# Patient Record
Sex: FEMALE | Race: WHITE | NOT HISPANIC OR LATINO | Employment: OTHER | URBAN - METROPOLITAN AREA
[De-identification: names, ages, dates, MRNs, and addresses within clinical notes are randomized per-mention and may not be internally consistent; named-entity substitution may affect disease eponyms.]

---

## 2017-04-11 ENCOUNTER — CONVERSION ENCOUNTER (OUTPATIENT)
Dept: GENERAL RADIOLOGY | Facility: HOSPITAL | Age: 72
End: 2017-04-11

## 2018-06-14 ENCOUNTER — OFFICE VISIT CONVERTED (OUTPATIENT)
Dept: SURGERY | Facility: CLINIC | Age: 73
End: 2018-06-14
Attending: SURGERY

## 2018-06-14 ENCOUNTER — CONVERSION ENCOUNTER (OUTPATIENT)
Dept: SURGERY | Facility: CLINIC | Age: 73
End: 2018-06-14

## 2018-06-22 ENCOUNTER — OFFICE VISIT CONVERTED (OUTPATIENT)
Dept: FAMILY MEDICINE CLINIC | Facility: CLINIC | Age: 73
End: 2018-06-22
Attending: FAMILY MEDICINE

## 2018-06-26 ENCOUNTER — CONVERSION ENCOUNTER (OUTPATIENT)
Dept: SURGERY | Facility: CLINIC | Age: 73
End: 2018-06-26

## 2018-06-26 ENCOUNTER — OFFICE VISIT CONVERTED (OUTPATIENT)
Dept: SURGERY | Facility: CLINIC | Age: 73
End: 2018-06-26
Attending: SURGERY

## 2018-06-29 ENCOUNTER — CONVERSION ENCOUNTER (OUTPATIENT)
Dept: FAMILY MEDICINE CLINIC | Facility: CLINIC | Age: 73
End: 2018-06-29

## 2018-09-10 ENCOUNTER — OFFICE VISIT CONVERTED (OUTPATIENT)
Dept: FAMILY MEDICINE CLINIC | Facility: CLINIC | Age: 73
End: 2018-09-10
Attending: FAMILY MEDICINE

## 2018-09-10 ENCOUNTER — CONVERSION ENCOUNTER (OUTPATIENT)
Dept: FAMILY MEDICINE CLINIC | Facility: CLINIC | Age: 73
End: 2018-09-10

## 2018-10-16 ENCOUNTER — OFFICE VISIT CONVERTED (OUTPATIENT)
Dept: FAMILY MEDICINE CLINIC | Facility: CLINIC | Age: 73
End: 2018-10-16
Attending: FAMILY MEDICINE

## 2019-06-28 ENCOUNTER — HOSPITAL ENCOUNTER (OUTPATIENT)
Dept: URGENT CARE | Facility: CLINIC | Age: 74
Discharge: HOME | End: 2019-06-28

## 2019-06-30 LAB — BACTERIA UR CULT: NORMAL

## 2019-09-10 ENCOUNTER — HOSPITAL ENCOUNTER (OUTPATIENT)
Dept: URGENT CARE | Facility: CLINIC | Age: 74
Discharge: HOME | End: 2019-09-10
Attending: NURSE PRACTITIONER

## 2019-09-12 LAB — BACTERIA SPEC AEROBE CULT: NORMAL

## 2020-01-16 ENCOUNTER — HOSPITAL ENCOUNTER (OUTPATIENT)
Dept: LAB | Facility: HOSPITAL | Age: 75
Discharge: HOME | End: 2020-01-16

## 2020-02-18 ENCOUNTER — OFFICE VISIT CONVERTED (OUTPATIENT)
Dept: FAMILY MEDICINE CLINIC | Facility: CLINIC | Age: 75
End: 2020-02-18
Attending: NURSE PRACTITIONER

## 2020-08-20 ENCOUNTER — OFFICE VISIT CONVERTED (OUTPATIENT)
Dept: PODIATRY | Facility: CLINIC | Age: 75
End: 2020-08-20
Attending: PODIATRIST

## 2020-10-06 ENCOUNTER — OFFICE VISIT CONVERTED (OUTPATIENT)
Dept: PODIATRY | Facility: CLINIC | Age: 75
End: 2020-10-06
Attending: PODIATRIST

## 2021-02-16 ENCOUNTER — APPOINTMENT (OUTPATIENT)
Dept: VACCINE CLINIC | Facility: HOSPITAL | Age: 76
End: 2021-02-16

## 2021-02-24 ENCOUNTER — IMMUNIZATION (OUTPATIENT)
Dept: VACCINE CLINIC | Facility: HOSPITAL | Age: 76
End: 2021-02-24

## 2021-02-24 PROCEDURE — 91300 HC SARSCOV02 VAC 30MCG/0.3ML IM: CPT | Performed by: INTERNAL MEDICINE

## 2021-02-24 PROCEDURE — 0001A: CPT | Performed by: INTERNAL MEDICINE

## 2021-03-17 ENCOUNTER — IMMUNIZATION (OUTPATIENT)
Dept: VACCINE CLINIC | Facility: HOSPITAL | Age: 76
End: 2021-03-17

## 2021-03-17 PROCEDURE — 0002A: CPT | Performed by: INTERNAL MEDICINE

## 2021-03-17 PROCEDURE — 91300 HC SARSCOV02 VAC 30MCG/0.3ML IM: CPT | Performed by: INTERNAL MEDICINE

## 2021-05-10 NOTE — H&P
History and Physical      Patient Name: Sue Simmons   Patient ID: [id number]   Sex: Female   YOB: 1945    Primary Care Provider: Samuel Manuel MD   Referring Provider: Jama Martin MD    Visit Date: August 20, 2020    Provider: Henry Sifuentes DPM   Location: [facility]   Location Address: [address] [place] [address]  [id number]   Location Phone: [phone]          Chief Complaint  · Right Foot Pain      History Of Present Illness  Sue Simmons is a 75 year old /White female who presents to the [facility] today new patient referred from Jama Martin MD.      New, Established, New Problem:  new  Location:  Right lateral midfoot  Duration:  2 months  Onset:  insidious  Nature:  sore  Stable, worsening, improving:  intermittent, stable    Aggravating factors:   Patient relates pain is aggravated by shoe gear and ambulation.   Previous Treatment:  none    Patient denies any fevers, chills, nausea, vomiting, shortness of breathe, nor any other constitutional signs nor symptoms.         Past Medical History  Anemia; Arthritis; Back Pain with Radiation; Breast lump; Cancer; Cancer of right breast; Foot pain, right; Hematuria, microscopic; Hemorrhoids; Hypothyroidism; IC (interstitial cystitis); Ingrown toenail; Mood disorder; Rectal bleeding; Vitamin D deficiency         Past Surgical History  Appendectomy; Breast; Colonoscopy; Cyst Removal; Cystoscopy; Lumpectomy, left breast; Lumpectomy, right breast; Port Placement; Santiago Cath Placement; Removal of hardware; Tonsillectomy         Medication List  ginkgo biloba 60 mg oral capsule; levothyroxine 100 mcg oral tablet; spironolactone 25 mg oral tablet; Vitamin D3 2,000 unit oral capsule         Allergy List  Codeine Phosphate; Keflex; PENICILLINS; Seasonal; SULFA (SULFONAMIDES)         Family Medical History  Lung Neoplasm, Malignant; Brain Neoplasm, Malignant; Heart Disease;  "Alzheimer's Disease; Diabetes, unspecified type; Throat Cancer; Renal Calculus; Family history of breast cancer; Family history of Graves' disease         Reproductive History   1 Para 0 0 0 1       Social History  Alcohol (Light); Caffeine (Current some day); Retired; Second hand smoke exposure (Never); Sedentary; Tobacco (Current every day);          Immunizations  Name Date Admin   Influenza    Tdap          Review of Systems  · Constitutional  o Denies  o : fatigue, night sweats  · Eyes  o Denies  o : double vision, blurred vision  · HENT  o Denies  o : vertigo, recent head injury  · Cardiovascular  o Denies  o : chest pain, irregular heart beats  · Respiratory  o Denies  o : shortness of breath, productive cough  · Gastrointestinal  o Denies  o : nausea, vomiting  · Genitourinary  o Denies  o : dysuria, urinary retention  · Integument  o Denies  o : hair growth change, new skin lesions  · Neurologic  o Denies  o : altered mental status, seizures  · Musculoskeletal  o * See HPI  · Endocrine  o Denies  o : cold intolerance, heat intolerance  · Heme-Lymph  o Denies  o : petechiae, lymph node enlargement or tenderness  · Allergic-Immunologic  o Denies  o : frequent illnesses      Vitals  Date Time BP Position Site L\R Cuff Size HR RR TEMP (F) WT  HT  BMI kg/m2 BSA m2 O2 Sat HC       2020 10:31 /71 Sitting    66 - R  97.3 198lbs 0oz 5'  8.5\" 29.67 2.08 98 %          Physical Examination  · Constitutional  o Appearance  o : Awake, alert, well developed, well nourished and well groomed  · Cardiovascular  o Peripheral Vascular System  o :   § Pedal Pulses  § : pulses 2 bilaterally  § Extremities  § : no edema of the lower extremities  · Musculoskeletal  o Extremeties/Joint  o : Lower extremity muscle strength and range of motion is equal and symmetrical bilaterally.   · Skin and Subcutaneous Tissue  o General Inspection  o : Skin is noted to have normal texture and turgor, with no excresences " noted.  o Digits and Nails  o : The tonails are noted to be without disease.  · Neurologic  o Sensation  o : Epicritic sensations intact bilaterally.   o Gait and Station  o :   § Gait Screening  § : normal gait  · Right Ankle/Foot  o Inspection  o : Right foot: Foot structure is noted to be normal. +TTP to the Right styloid process area. No edema, erythema, lymphangitis, nor signs of infection.   · In Office Procedures  o View  o : AP/LATERAL/OBLIQUE   o Site  o : right, foot   o Indication  o : Right Foot Pain   o Study  o : X-rays ordered, taken in the office, and reviewed today.  o Xray  o : No periosteal reactions nor osteolytic changes seen. No occult fractures seen. Osteopenia changes seen throughout consistent with the patient's age.  o Comparative Data  o : No comparative data found          Assessment  · Foot pain, right     729.5/M79.671  · Bursitis     727.3/M71.9      Plan  · Orders  o Foot (Right) 3 or more views X-Ray [facility] Preferred View (26089-BO) - - 08/20/2020  · Medications  o Voltaren 1 % topical gel   SIG: apply to affected area(s) by topical route 4 times a day for 30 days   DISP: (5) 100 gm tube with 3 refills  Prescribed on 08/20/2020     o Medications have been Reconciled  o Transition of Care or Provider Policy  · Instructions  o Follow Up PRN.  o Discuss Findings: I have discussed the findings of this evaluation with the patient. The discussion included a complete verbal explanation of any changes in the examination results, diagnosis, and the current treatment plan. A schedule for future care needs was explained. If any questions should arise after returning home, I have encouraged the patient to feel free to contact Dr. Sifuentes. The patient states understanding and agreement with this plan.  o Discussed proper shoegear for the patient's feet and medical condition. Pt states understanding and agreement with this plan.  o Patient to monitor for recurrence of symptoms and to contact   [facility] office for a follow-up appointment.  o Rice Therapy: It is important to treat any injury as soon as possible to help control swelling and increase recovery time. The recognized regimen for immediate treatment of sport injuries includes rest, ice (cold application), compression, and elevation (RICE). Remove the injured athlete from play, apply ice to the affected area, wrap or compress the injured area with an elastic bandage when appropriate, and elevate the injured area above heart level to reduce swelling. The patient is to not use ice for longer than 20 minutes at a time, with at least 20 minutes of no ice usage between applications. The patient states understanding and agreement with this plan.   o Electronically Identified Patient Education Materials Provided Electronically  · Disposition  o Call or Return if symptoms worsen or persist.            Electronically Signed by: Henry Sifuentes DPM -Author on August 20, 2020 10:53:09 AM

## 2021-05-13 NOTE — PROGRESS NOTES
Progress Note      Patient Name: Sue Simmons   Patient ID: [id number]   Sex: Female   YOB: 1945    Primary Care Provider: Samuel Manuel MD   Referring Provider: Henry Sifuentes DPM    Visit Date: October 6, 2020    Provider: Henry Sifuentes DPM   [place] [facility]   Location Address: [address] [place] [address]  [id number]   Location Phone: [phone]          Chief Complaint  · Right Foot Pain      History Of Present Illness  Sue Simmons is a 75 year old /White female who presents to the [facility] today a follow up for:      New, Established, New Problem:  est  Location:  Right lateral midfoot  Duration:    Onset:  insidious  Nature:  sore  Stable, worsening, improving:  no improvement   Aggravating factors:   Patient relates pain is aggravated by shoe gear and ambulation.   Previous Treatment:  Voltaren gel    Patient denies any fevers, chills, nausea, vomiting, shortness of breathe, nor any other constitutional signs nor symptoms.    Patient relates no medical changes since their last visit.       Past Medical History  Anemia; Arthritis; Back Pain with Radiation; Breast lump; Cancer; Cancer of right breast; Foot pain, right; Hematuria, microscopic; Hemorrhoids; Hypothyroidism; IC (interstitial cystitis); Ingrown toenail; Mood disorder; Rectal bleeding; Vitamin D deficiency         Past Surgical History  Appendectomy; Breast; Colonoscopy; Cyst Removal; Cystoscopy; Lumpectomy, left breast; Lumpectomy, right breast; Port Placement; Santiago Cath Placement; Removal of hardware; Tonsillectomy         Medication List  ginkgo biloba 60 mg oral capsule; levothyroxine 100 mcg oral tablet; LEVOTHYROXINE 0.100MG (100MCG) TAB; spironolactone 25 mg oral tablet; Vitamin D3 2,000 unit oral capsule; Voltaren 1 % topical gel         Allergy List  Codeine Phosphate; Keflex; PENICILLINS; Seasonal; SULFA (SULFONAMIDES)         Family Medical History  Lung  "Neoplasm, Malignant; Brain Neoplasm, Malignant; Heart Disease; Alzheimer's Disease; Diabetes, unspecified type; Throat Cancer; Renal Calculus; Family history of breast cancer; Family history of Graves' disease         Reproductive History   1 Para 0 0 0 1       Social History  Alcohol (Light); Caffeine (Current some day); Retired; Second hand smoke exposure (Never); Sedentary; Tobacco (Current every day);          Immunizations  Name Date Admin   Influenza 09/10/2017   Tdap 2017         Review of Systems  · Constitutional  o Denies  o : fatigue, night sweats  · Eyes  o Denies  o : double vision, blurred vision  · HENT  o Denies  o : vertigo, recent head injury  · Cardiovascular  o Denies  o : chest pain, irregular heart beats  · Respiratory  o Denies  o : shortness of breath, productive cough  · Gastrointestinal  o Denies  o : nausea, vomiting  · Genitourinary  o Denies  o : dysuria, urinary retention  · Integument  o Denies  o : hair growth change, new skin lesions  · Neurologic  o Denies  o : altered mental status, seizures  · Musculoskeletal  o * See HPI  · Endocrine  o Denies  o : cold intolerance, heat intolerance  · Heme-Lymph  o Denies  o : petechiae, lymph node enlargement or tenderness  · Allergic-Immunologic  o Denies  o : frequent illnesses      Vitals  Date Time BP Position Site L\R Cuff Size HR RR TEMP (F) WT  HT  BMI kg/m2 BSA m2 O2 Sat FR L/min FiO2 HC       10/06/2020 08:10 /60 Sitting    68 - R  97.6 193lbs 16oz 5'  7\" 30.38 2.04 100 %            Physical Examination  · Constitutional  o Appearance  o : Awake, alert, well developed, well nourished and well groomed  · Cardiovascular  o Peripheral Vascular System  o :   § Pedal Pulses  § : pulses 2 bilaterally  § Extremities  § : no edema of the lower extremities  · Musculoskeletal  o Extremeties/Joint  o : Lower extremity muscle strength and range of motion is equal and symmetrical bilaterally.   · Skin and Subcutaneous " Tissue  o General Inspection  o : Skin is noted to have normal texture and turgor, with no excresences noted.  o Digits and Nails  o : The tonails are noted to be without disease.  · Neurologic  o Sensation  o : Epicritic sensations intact bilaterally.   o Gait and Station  o :   § Gait Screening  § : normal gait  · Right Ankle/Foot  o Inspection  o : Right foot: Foot structure is noted to be normal. +TTP to the Right styloid process area. No edema, erythema, lymphangitis, nor signs of infection.   · Injection Note/Aspiration Note  o Site  o : Right 5th styloid process area  o Procedure  o : This patient presents for a trigger point injection, which is located Retrocalcaneal bursa. I have discussed the nature, risks, benefits, alternatives and limitations of this procedure with this patient and obtained informed consent. The area was sterilely prepped with isopropyl alcohol. A 27 gauge, 1.25-inch needle was inserted into the affected area. The area was injected with 0.75 mL, of dexamethasone 4 mg/mL. The steroid was mixed with 0.75 mL, of bupivacaine 0.25%.  o Medication  o : 0.5ml of 1% lidocaine plain, 0.5ml of 40mg/ml Kenalog, and 0.5ml of 4mg/ml Dexamethasone   o Disposition  o : The patient tolerated the procedure well.          Assessment  · Foot pain, right     729.5/M79.671  · Bursitis     727.3/M71.9      Plan  · Orders  o Decadron 4 mg/1cc Injection () - 729.5/M79.671, 727.3/M71.9 - 10/06/2020   Injection - Dexamethasone 4mg/mL; Dose: 2 mg; Site: Not Entered; Route: subcutaneous; Date: 10/06/2020 08:29:03; Exp: 12/31/2020; Lot: [id number]; Mfg: [employer]; TradeName: dexamethasone sodium phosphate; Location: [facility]; Administered By: Henry Sifuentes DPM; Comment: ndc 62647-895-22 Injection site right foot  o 2.00 - Kenalog Injection 20mg (-3) - 729.5/M79.671, 727.3/M71.9 - 10/06/2020   Injection - Kenalog 20 mg; Dose: 20mg; Site: Not Entered; Route: subcutaneous; Date: 10/06/2020  08:30:24; Exp: 04/30/2022; Lot: [id number]; Mfg: [employer].; TradeName: triamcinolone acetonide; Location: [facility]; Administered By: Henry Sifuentes DPM; Comment: NDC 21446-163-81 Injection site right foot  o Inj Tendon Sheath Or Ligament (19553) - 729.5/M79.671, 727.3/M71.9 - 10/06/2020  · Medications  o Medications have been Reconciled  o Transition of Care or Provider Policy  · Instructions  o Patient request PRN follow-up.  o Discuss Findings: I have discussed the findings of this evaluation with the patient. The discussion included a complete verbal explanation of any changes in the examination results, diagnosis, and the current treatment plan. A schedule for future care needs was explained. If any questions should arise after returning home, I have encouraged the patient to feel free to contact Dr. Sifuentes. The patient states understanding and agreement with this plan.  o Discussed proper shoegear for the patient's feet and medical condition. Pt states understanding and agreement with this plan.  o Patient to monitor for recurrence of symptoms and to contact Dr. Stokes office for a follow-up appointment.  o Rice Therapy: It is important to treat any injury as soon as possible to help control swelling and increase recovery time. The recognized regimen for immediate treatment of sport injuries includes rest, ice (cold application), compression, and elevation (RICE). Remove the injured athlete from play, apply ice to the affected area, wrap or compress the injured area with an elastic bandage when appropriate, and elevate the injured area above heart level to reduce swelling. The patient is to not use ice for longer than 20 minutes at a time, with at least 20 minutes of no ice usage between applications. The patient states understanding and agreement with this plan.   o Electronically Identified Patient Education Materials Provided Electronically  · Disposition  o Call or Return if symptoms worsen  or persist.            Electronically Signed by: Henry Sifuentes DPM -Author on October 6, 2020 10:23:25 AM

## 2021-05-14 VITALS
HEART RATE: 68 BPM | SYSTOLIC BLOOD PRESSURE: 120 MMHG | HEIGHT: 67 IN | DIASTOLIC BLOOD PRESSURE: 60 MMHG | BODY MASS INDEX: 30.45 KG/M2 | WEIGHT: 194 LBS | TEMPERATURE: 97.6 F | OXYGEN SATURATION: 100 %

## 2021-05-15 VITALS
RESPIRATION RATE: 21 BRPM | OXYGEN SATURATION: 93 % | WEIGHT: 186.19 LBS | SYSTOLIC BLOOD PRESSURE: 108 MMHG | TEMPERATURE: 97.2 F | DIASTOLIC BLOOD PRESSURE: 56 MMHG | HEIGHT: 67 IN | BODY MASS INDEX: 29.22 KG/M2

## 2021-05-15 VITALS
SYSTOLIC BLOOD PRESSURE: 132 MMHG | DIASTOLIC BLOOD PRESSURE: 71 MMHG | BODY MASS INDEX: 30.01 KG/M2 | TEMPERATURE: 97.3 F | HEIGHT: 68 IN | WEIGHT: 198 LBS | HEART RATE: 66 BPM | OXYGEN SATURATION: 98 %

## 2021-05-16 VITALS
TEMPERATURE: 96.3 F | BODY MASS INDEX: 31.4 KG/M2 | SYSTOLIC BLOOD PRESSURE: 124 MMHG | HEART RATE: 75 BPM | HEIGHT: 67 IN | DIASTOLIC BLOOD PRESSURE: 66 MMHG | WEIGHT: 200.06 LBS | OXYGEN SATURATION: 98 %

## 2021-05-16 VITALS
HEIGHT: 67 IN | WEIGHT: 202.5 LBS | SYSTOLIC BLOOD PRESSURE: 130 MMHG | HEART RATE: 73 BPM | BODY MASS INDEX: 31.78 KG/M2 | OXYGEN SATURATION: 98 % | DIASTOLIC BLOOD PRESSURE: 70 MMHG | TEMPERATURE: 96.7 F

## 2021-05-16 VITALS — HEIGHT: 67 IN | RESPIRATION RATE: 14 BRPM | BODY MASS INDEX: 27.78 KG/M2 | WEIGHT: 177 LBS

## 2021-05-16 VITALS — RESPIRATION RATE: 16 BRPM | HEIGHT: 67 IN | BODY MASS INDEX: 27.78 KG/M2 | WEIGHT: 177 LBS

## 2021-05-16 VITALS — HEART RATE: 109 BPM | SYSTOLIC BLOOD PRESSURE: 131 MMHG | TEMPERATURE: 96.8 F | DIASTOLIC BLOOD PRESSURE: 62 MMHG

## 2021-07-26 ENCOUNTER — OFFICE VISIT (OUTPATIENT)
Dept: PODIATRY | Facility: CLINIC | Age: 76
End: 2021-07-26

## 2021-07-26 VITALS
HEART RATE: 75 BPM | WEIGHT: 210 LBS | HEIGHT: 67 IN | OXYGEN SATURATION: 100 % | TEMPERATURE: 97.1 F | SYSTOLIC BLOOD PRESSURE: 140 MMHG | BODY MASS INDEX: 32.96 KG/M2 | DIASTOLIC BLOOD PRESSURE: 54 MMHG

## 2021-07-26 DIAGNOSIS — M77.51 BURSITIS OF RIGHT FOOT: Primary | ICD-10-CM

## 2021-07-26 DIAGNOSIS — M79.671 FOOT PAIN, RIGHT: ICD-10-CM

## 2021-07-26 PROCEDURE — 99213 OFFICE O/P EST LOW 20 MIN: CPT | Performed by: PODIATRIST

## 2021-07-26 PROCEDURE — 20550 NJX 1 TENDON SHEATH/LIGAMENT: CPT | Performed by: PODIATRIST

## 2021-07-26 RX ORDER — MULTIPLE VITAMINS W/ MINERALS TAB 9MG-400MCG
1 TAB ORAL DAILY
COMMUNITY
End: 2023-01-11

## 2021-07-26 RX ORDER — TRIAMCINOLONE ACETONIDE 40 MG/ML
20 INJECTION, SUSPENSION INTRA-ARTICULAR; INTRAMUSCULAR ONCE
Status: COMPLETED | OUTPATIENT
Start: 2021-07-26 | End: 2021-07-26

## 2021-07-26 RX ORDER — LEVOTHYROXINE SODIUM 0.05 MG/1
50 TABLET ORAL DAILY
COMMUNITY
End: 2021-08-11

## 2021-07-26 RX ORDER — LIDOCAINE HYDROCHLORIDE 10 MG/ML
0.5 INJECTION, SOLUTION INFILTRATION; PERINEURAL ONCE
Status: COMPLETED | OUTPATIENT
Start: 2021-07-26 | End: 2021-07-26

## 2021-07-26 RX ORDER — DEXAMETHASONE SODIUM PHOSPHATE 4 MG/ML
2 INJECTION, SOLUTION INTRA-ARTICULAR; INTRALESIONAL; INTRAMUSCULAR; INTRAVENOUS; SOFT TISSUE ONCE
Status: COMPLETED | OUTPATIENT
Start: 2021-07-26 | End: 2021-07-26

## 2021-07-26 RX ADMIN — DEXAMETHASONE SODIUM PHOSPHATE 2 MG: 4 INJECTION, SOLUTION INTRA-ARTICULAR; INTRALESIONAL; INTRAMUSCULAR; INTRAVENOUS; SOFT TISSUE at 15:29

## 2021-07-26 RX ADMIN — TRIAMCINOLONE ACETONIDE 20 MG: 40 INJECTION, SUSPENSION INTRA-ARTICULAR; INTRAMUSCULAR at 15:30

## 2021-07-26 RX ADMIN — LIDOCAINE HYDROCHLORIDE 0.5 ML: 10 INJECTION, SOLUTION INFILTRATION; PERINEURAL at 15:30

## 2021-07-26 NOTE — PATIENT INSTRUCTIONS
Instructed to follow-up with her primary care physician.  Patient may begin to weight bear as tolerated in supportive shoes.  No impact activities for two weeks.  After that time, the patient may increase activities as tolerated. Patient states understanding and agreement with this plan.  The patient states understanding and agreement with this plan.

## 2021-07-26 NOTE — PROGRESS NOTES
[facility] - [facility]IATRY    Today's Date: 07/26/21    Patient Name: Sue Simmons  MRN: [id number]  CSN: [id number]  PCP: Provider, No Known  Referring Provider: No ref. provider found    SUBJECTIVE     Chief Complaint   Patient presents with   • Right Foot - Edema, Pain, Follow-up   • Left Foot - Edema, Pain, Follow-up     HPI: Sue Simmons, a 75 y.o.female, comes presents to clinic.    New, Established, New Problem: Established    Location: Right lateral midfoot    Duration: Spring 2021    Onset: Insidious    Nature: Sore, achy, recurring    Stable, worsening, improving: Worsening    Aggravating factors:  Patient relates pain is aggravated by shoe gear and ambulation.      Previous Treatment: Cortisone shot in the fall 2020 along with use of Voltaren gel    Patient denies any fevers, chills, nausea, vomiting, shortness of breathe, nor any other constitutional signs nor symptoms.    No other pedal complaints at this time.    Recent medical changes: None    Patient states she has not seen her primary care provider in a while.    Past Medical History:   Diagnosis Date   • Anemia    • Arthritis    • Back pain with radiation 01/24/2013   • Breast lump    • Cancer (CMS/HCC)    • Cancer of right breast (CMS/HCC) 02/2012    had lumpectomy, undergoing treatment at Dr. Campoverde's every Tuesday for 6 months, then radiation   • Foot pain, right    • Hemorrhoids    • Hypothyroidism 01/24/2013   • IC (interstitial cystitis)    • Ingrowing toenail    • Microscopic hematuria    • Mood disorder (CMS/HCC)    • Rectal bleeding    • Vitamin D deficiency      Past Surgical History:   Procedure Laterality Date   • APPENDECTOMY     • BREAST LUMPECTOMY Bilateral    • BREAST SURGERY     • COLONOSCOPY     • CYST REMOVAL  1957    removed form coccyx   • CYSTOSCOPY     • HARDWARE REMOVAL     • PORTACATH PLACEMENT     • TONSILLECTOMY       Family History   Problem Relation Age of Onset   • Lung cancer Mother    • Brain cancer  Mother    • Diabetes Mother         unspecified type   • Throat cancer Mother    • Heart disease Father    • Breast cancer Father         40s   • Other Other         heart surgery in 2005   • Alzheimer's disease Other    • Graves' disease Other    • Diabetes Maternal Aunt         unspecified type   • Diabetes Maternal Uncle         unspecified type   • Kidney nephrosis Maternal Uncle      Social History     Socioeconomic History   • Marital status:      Spouse name: Not on file   • Number of children: Not on file   • Years of education: Not on file   • Highest education level: Not on file   Tobacco Use   • Smoking status: Former Smoker     Types: Cigarettes   • Smokeless tobacco: Never Used   • Tobacco comment: quit at age 59, second hand smoke exposure- never   Vaping Use   • Vaping Use: Never used   Substance and Sexual Activity   • Alcohol use: Yes     Comment: light   • Drug use: Never   • Sexual activity: Defer     Allergies   Allergen Reactions   • Sulfa Antibiotics Itching     Current Outpatient Medications   Medication Sig Dispense Refill   • levothyroxine (SYNTHROID, LEVOTHROID) 50 MCG tablet Take 50 mcg by mouth Daily.     • multivitamin with minerals (multivitamin with minerals) tablet tablet Take 1 tablet by mouth Daily.       Current Facility-Administered Medications   Medication Dose Route Frequency Provider Last Rate Last Admin   • dexamethasone sodium phosphate injection 2 mg  2 mg Intramuscular Once Henry Sifuentes DPM       • lidocaine (XYLOCAINE) 1 % injection 0.5 mL  0.5 mL Infiltration Once Henry Sifuentes DPM       • triamcinolone acetonide (KENALOG-40) injection 20 mg  20 mg Intramuscular Once Henry Sifuentse DPM         Review of Systems   Constitutional: Negative.    Musculoskeletal:        Pain to right lateral midfoot   All other systems reviewed and are negative.      OBJECTIVE     Vitals:    07/26/21 1405   BP: 140/54   Pulse: 75   Temp: 97.1 °F (36.2 °C)    SpO2: 100%       Patient seen in no apparent distress.      PHYSICAL EXAM:     Foot/Ankle Exam:       General:   Appearance: appears stated age and healthy and obesity    Orientation: disoriented    Orientation comment:  Patient seems slightly disoriented.  Was redirected by her daughter during the visit.  Affect: appropriate    Gait: unimpaired    Shoe Gear:  Casual shoes    VASCULAR      Right Foot Vascularity   Normal vascular exam    Dorsalis pedis:  1+  Posterior tibial:  1+  Skin Temperature: cool    Edema Gradin+ and pitting  CFT:  < 3 seconds  Pedal Hair Growth:  Absent  Varicosities: mild varicosities       Left Foot Vascularity   Normal vascular exam    Dorsalis pedis:  1+  Posterior tibial:  1+  Skin Temperature: cool    Edema Grading:  Pitting and 2+  CFT:  < 3 seconds  Pedal Hair Growth:  Present  Varicosities: mild varicosities        NEUROLOGIC     Right Foot Neurologic   Normal sensation    Light touch sensation:  Normal  Vibratory sensation:  Normal  Hot/Cold sensation: normal    Protective Sensation using [place] Monofilament:  10     Left Foot Neurologic   Normal sensation    Light touch sensation:  Normal  Vibratory sensation:  Normal  Hot/cold sensation: normal    Protective Sensation using [place] Monofilament:  10     MUSCULOSKELETAL      Right Foot Musculoskeletal   Tenderness: fifth metatarsal base    Tenderness comment:  No signs of edema, erythema, lymphangitis, nor signs of infection.       MUSCLE STRENGTH     Right Foot Muscle Strength   Normal strength    Foot dorsiflexion:  5  Foot plantar flexion:  5  Foot inversion:  5  Foot eversion:  5     Left Foot Muscle Strength   Foot dorsiflexion:  5  Foot plantar flexion:  5  Foot inversion:  5  Foot eversion:  5     RANGE OF MOTION      Right Foot Range of Motion   Foot and ankle ROM within normal limits       Left Foot Range of Motion   Foot and ankle ROM within normal limits       DERMATOLOGIC     Right Foot  Dermatologic   Skin: skin intact    Nails: normal       Left Foot Dermatologic   Skin: skin intact    Nails: normal      Injection  Date/Time: 07/26/2021  Performed by: Henry Sifuentes DPM  Authorized by: Henry Sifuentes DPM     Consent: Verbal consent obtained. Written consent obtained.  Risks and benefits: risks, benefits and alternatives were discussed  Consent given by: patient  Patient identity confirmed: verbally with patient  Indications: pain relief    Injection site: Right styloid process.    Sedation:  Patient sedated: no    Patient position: sitting  Needle size: 27 G  Local anesthetic: 0.5 ml 1% Lidocaine plain, 0.5 ml Kenalog 40 mg/ml, and 0.5 ml Decadron 4 mg/mL.   Outcome: pain improved  Patient tolerance: Patient tolerated the procedure well with no immediate complications        ASSESSMENT/PLAN     Diagnoses and all orders for this visit:    1. Bursitis of right foot (Primary)  -     dexamethasone sodium phosphate injection 2 mg  -     lidocaine (XYLOCAINE) 1 % injection 0.5 mL  -     triamcinolone acetonide (KENALOG-40) injection 20 mg    2. Foot pain, right        Comprehensive lower extremity examination and evaluation was performed.    Discussed findings and treatment plan including risks, benefits, and treatment options with patient in detail. Patient agreed with treatment plan.    Patient instructed to follow-up with her primary care physician.  Patient may begin to weight bear as tolerated in supportive shoes.  No impact activities for two weeks.  After that time, the patient may increase activities as tolerated. Patient states understanding and agreement with this plan.    An After Visit Summary was printed and given to the patient at discharge, including (if requested) any available informative/educational handouts regarding diagnosis, treatment, or medications. All questions were answered to patient/family satisfaction. Should symptoms fail to improve or worsen they agree to call  or return to clinic or to go to the Emergency Department. Discussed the importance of following up with any needed screening tests/labs/specialist appointments and any requested follow-up recommended by me today. Importance of maintaining follow-up discussed and patient accepts that missed appointments can delay diagnosis and potentially lead to worsening of conditions.    Return if symptoms worsen or fail to improve, for Patient instructed to follow-up with her primary care physician.., or sooner if acute issues arise.    This document has been electronically signed by Henry Sifuentes DPM on July 26, 2021 15:15 EDT

## 2021-07-27 RX ORDER — SPIRONOLACTONE 25 MG/1
TABLET ORAL
Qty: 90 TABLET | Refills: 1 | Status: SHIPPED | OUTPATIENT
Start: 2021-07-27 | End: 2021-10-28

## 2021-08-07 DIAGNOSIS — E03.9 HYPOTHYROIDISM, UNSPECIFIED TYPE: Primary | ICD-10-CM

## 2021-08-09 RX ORDER — LEVOTHYROXINE SODIUM 0.1 MG/1
TABLET ORAL
Qty: 90 TABLET | Refills: 1 | Status: SHIPPED | OUTPATIENT
Start: 2021-08-09 | End: 2021-08-11

## 2021-08-11 ENCOUNTER — OFFICE VISIT (OUTPATIENT)
Dept: FAMILY MEDICINE CLINIC | Facility: CLINIC | Age: 76
End: 2021-08-11

## 2021-08-11 VITALS
HEIGHT: 67 IN | SYSTOLIC BLOOD PRESSURE: 112 MMHG | RESPIRATION RATE: 16 BRPM | DIASTOLIC BLOOD PRESSURE: 76 MMHG | HEART RATE: 64 BPM | WEIGHT: 203.9 LBS | BODY MASS INDEX: 32 KG/M2 | TEMPERATURE: 97.5 F | OXYGEN SATURATION: 98 %

## 2021-08-11 DIAGNOSIS — E03.9 HYPOTHYROIDISM, UNSPECIFIED TYPE: ICD-10-CM

## 2021-08-11 DIAGNOSIS — Z13.820 ENCOUNTER FOR OSTEOPOROSIS SCREENING IN ASYMPTOMATIC POSTMENOPAUSAL PATIENT: ICD-10-CM

## 2021-08-11 DIAGNOSIS — Z78.0 ENCOUNTER FOR OSTEOPOROSIS SCREENING IN ASYMPTOMATIC POSTMENOPAUSAL PATIENT: ICD-10-CM

## 2021-08-11 DIAGNOSIS — Z76.89 ENCOUNTER TO ESTABLISH CARE: Primary | ICD-10-CM

## 2021-08-11 DIAGNOSIS — Z12.31 ENCOUNTER FOR SCREENING MAMMOGRAM FOR MALIGNANT NEOPLASM OF BREAST: ICD-10-CM

## 2021-08-11 DIAGNOSIS — Z85.3 HISTORY OF BREAST CANCER: ICD-10-CM

## 2021-08-11 DIAGNOSIS — R60.9 WATER RETENTION: ICD-10-CM

## 2021-08-11 DIAGNOSIS — R53.83 OTHER FATIGUE: ICD-10-CM

## 2021-08-11 DIAGNOSIS — Z12.11 SCREEN FOR COLON CANCER: ICD-10-CM

## 2021-08-11 PROCEDURE — 99214 OFFICE O/P EST MOD 30 MIN: CPT | Performed by: STUDENT IN AN ORGANIZED HEALTH CARE EDUCATION/TRAINING PROGRAM

## 2021-08-11 RX ORDER — LEVOTHYROXINE SODIUM 0.1 MG/1
100 TABLET ORAL DAILY
Qty: 90 TABLET | Refills: 0 | Status: SHIPPED | OUTPATIENT
Start: 2021-08-11 | End: 2022-02-08

## 2021-08-11 NOTE — PROGRESS NOTES
"Chief Complaint  Establish Care    Subjective         Sue Simmons is a 75 y.o. female who presents to [facility]    75 years old female, new patient to me but not to the practice, comes to the clinic today to follow-up on chronic conditions/medication management and preventive measures.    Patient has a history of hypothyroidism and water retention; patient is taking levothyroxine and Aldactone.    Patient denies any other acute complaints at this time, reports she has not seen by any primary care doctor in last 1 year.  No blood work done in last 1 year.    Patient denies any chest pain or shortness of breath on exertion.    Daughter is present in the room who reports that patient does have a history of Alzheimer which was diagnosed at [place] few years back.  Patient is currently not taking any medications, lives by herself without any issues with daily functioning or memory.  Review of Systems   Objective   Vital Signs:   Vitals:    08/11/21 1546   BP: 112/76   Pulse: 64   Resp: 16   Temp: 97.5 °F (36.4 °C)   SpO2: 98%   Weight: 92.5 kg (203 lb 14.4 oz)   Height: 170.2 cm (67\")      Body mass index is 31.94 kg/m².   Physical Exam  Vitals reviewed.   Constitutional:       Appearance: Normal appearance. She is well-developed.   HENT:      Head: Normocephalic and atraumatic.      Right Ear: External ear normal.      Left Ear: External ear normal.      Mouth/Throat:      Pharynx: No oropharyngeal exudate.   Eyes:      Conjunctiva/sclera: Conjunctivae normal.      Pupils: Pupils are equal, round, and reactive to light.   Cardiovascular:      Rate and Rhythm: Normal rate and regular rhythm.      Heart sounds: No murmur heard.   No friction rub. No gallop.    Pulmonary:      Effort: Pulmonary effort is normal.      Breath sounds: Normal breath sounds. No wheezing or rhonchi.   Abdominal:      General: Bowel sounds are normal. There is no distension.      Palpations: Abdomen is " soft.      Tenderness: There is no abdominal tenderness.   Skin:     General: Skin is warm and dry.   Neurological:      Mental Status: She is alert and oriented to person, place, and time.      Cranial Nerves: No cranial nerve deficit.   Psychiatric:         Mood and Affect: Mood and affect normal.         Behavior: Behavior normal.         Thought Content: Thought content normal.         Judgment: Judgment normal.                 Assessment and Plan   Diagnoses and all orders for this visit:    1. Encounter to establish care (Primary)  -     TSH Rfx On Abnormal To Free T4; Future  -     CBC & Differential; Future  -     Comprehensive Metabolic Panel; Future  -     Lipid Panel; Future  -     Vitamin B12; Future  -     Folate; Future    2. Encounter for screening mammogram for malignant neoplasm of breast  -     Mammo Screening Bilateral With CAD  -     TSH Rfx On Abnormal To Free T4; Future  -     CBC & Differential; Future  -     Comprehensive Metabolic Panel; Future  -     Lipid Panel; Future  -     Vitamin B12; Future  -     Folate; Future    3. Screen for colon cancer  -     Cologuard - Stool, Per Rectum; Future  -     TSH Rfx On Abnormal To Free T4; Future  -     CBC & Differential; Future  -     Comprehensive Metabolic Panel; Future  -     Lipid Panel; Future  -     Vitamin B12; Future  -     Folate; Future    4. Encounter for osteoporosis screening in asymptomatic postmenopausal patient  -     DEXA Bone Density Axial  -     TSH Rfx On Abnormal To Free T4; Future  -     CBC & Differential; Future  -     Comprehensive Metabolic Panel; Future  -     Lipid Panel; Future  -     Vitamin B12; Future  -     Folate; Future    5. Hypothyroidism, unspecified type  Comments:  Controlled on levothyroxine 100 MCG daily  Orders:  -     levothyroxine (Synthroid) 100 MCG tablet; Take 1 tablet by mouth Daily.  Dispense: 90 tablet; Refill: 0  -     TSH Rfx On Abnormal To Free T4; Future  -     CBC & Differential; Future  -      Comprehensive Metabolic Panel; Future  -     Lipid Panel; Future  -     Vitamin B12; Future  -     Folate; Future    6. Water retention  Comments:  controlled on aldecton   Orders:  -     TSH Rfx On Abnormal To Free T4; Future  -     CBC & Differential; Future  -     Comprehensive Metabolic Panel; Future  -     Lipid Panel; Future  -     Vitamin B12; Future  -     Folate; Future    7. History of breast cancer  Comments:  S/p lumpectomy in the past and radiation therapy  Orders:  -     TSH Rfx On Abnormal To Free T4; Future  -     CBC & Differential; Future  -     Comprehensive Metabolic Panel; Future  -     Lipid Panel; Future  -     Vitamin B12; Future  -     Folate; Future    8. Other fatigue  -     TSH Rfx On Abnormal To Free T4; Future  -     CBC & Differential; Future  -     Comprehensive Metabolic Panel; Future  -     Lipid Panel; Future  -     Vitamin B12; Future  -     Folate; Future      Daughter is present in the room; all the questions were answered and some history also gathered from daughter.      Follow Up   Return in about 6 months (around 2/11/2022) for Recheck.  Patient was given instructions and counseling regarding her condition or for health maintenance advice. Please see specific information pulled into the AVS if appropriate.

## 2021-10-28 RX ORDER — SPIRONOLACTONE 25 MG/1
TABLET ORAL
Qty: 90 TABLET | Refills: 1 | Status: SHIPPED | OUTPATIENT
Start: 2021-10-28 | End: 2022-07-06 | Stop reason: SDUPTHER

## 2021-11-30 ENCOUNTER — APPOINTMENT (OUTPATIENT)
Dept: BONE DENSITY | Facility: HOSPITAL | Age: 76
End: 2021-11-30

## 2021-11-30 ENCOUNTER — HOSPITAL ENCOUNTER (OUTPATIENT)
Dept: MAMMOGRAPHY | Facility: HOSPITAL | Age: 76
End: 2021-11-30

## 2021-12-08 ENCOUNTER — LAB (OUTPATIENT)
Dept: LAB | Facility: HOSPITAL | Age: 76
End: 2021-12-08

## 2021-12-08 DIAGNOSIS — E03.9 HYPOTHYROIDISM, UNSPECIFIED TYPE: ICD-10-CM

## 2021-12-08 DIAGNOSIS — R53.83 OTHER FATIGUE: ICD-10-CM

## 2021-12-08 DIAGNOSIS — Z76.89 ENCOUNTER TO ESTABLISH CARE: ICD-10-CM

## 2021-12-08 DIAGNOSIS — Z78.0 ENCOUNTER FOR OSTEOPOROSIS SCREENING IN ASYMPTOMATIC POSTMENOPAUSAL PATIENT: ICD-10-CM

## 2021-12-08 DIAGNOSIS — Z12.31 ENCOUNTER FOR SCREENING MAMMOGRAM FOR MALIGNANT NEOPLASM OF BREAST: ICD-10-CM

## 2021-12-08 DIAGNOSIS — Z12.11 SCREEN FOR COLON CANCER: ICD-10-CM

## 2021-12-08 DIAGNOSIS — Z13.820 ENCOUNTER FOR OSTEOPOROSIS SCREENING IN ASYMPTOMATIC POSTMENOPAUSAL PATIENT: ICD-10-CM

## 2021-12-08 DIAGNOSIS — R60.9 WATER RETENTION: ICD-10-CM

## 2021-12-08 DIAGNOSIS — Z85.3 HISTORY OF BREAST CANCER: ICD-10-CM

## 2021-12-08 LAB
ALBUMIN SERPL-MCNC: 4.4 G/DL (ref 3.5–5.2)
ALBUMIN/GLOB SERPL: 1.6 G/DL
ALP SERPL-CCNC: 52 U/L (ref 39–117)
ALT SERPL W P-5'-P-CCNC: 19 U/L (ref 1–33)
ANION GAP SERPL CALCULATED.3IONS-SCNC: 10.7 MMOL/L (ref 5–15)
AST SERPL-CCNC: 19 U/L (ref 1–32)
BASOPHILS # BLD AUTO: 0.03 10*3/MM3 (ref 0–0.2)
BASOPHILS NFR BLD AUTO: 0.4 % (ref 0–1.5)
BILIRUB SERPL-MCNC: 0.6 MG/DL (ref 0–1.2)
BUN SERPL-MCNC: 16 MG/DL (ref 8–23)
BUN/CREAT SERPL: 13.6 (ref 7–25)
CALCIUM SPEC-SCNC: 9.8 MG/DL (ref 8.6–10.5)
CHLORIDE SERPL-SCNC: 101 MMOL/L (ref 98–107)
CHOLEST SERPL-MCNC: 212 MG/DL (ref 0–200)
CO2 SERPL-SCNC: 27.3 MMOL/L (ref 22–29)
CREAT SERPL-MCNC: 1.18 MG/DL (ref 0.57–1)
DEPRECATED RDW RBC AUTO: 44.1 FL (ref 37–54)
EOSINOPHIL # BLD AUTO: 0.07 10*3/MM3 (ref 0–0.4)
EOSINOPHIL NFR BLD AUTO: 0.9 % (ref 0.3–6.2)
ERYTHROCYTE [DISTWIDTH] IN BLOOD BY AUTOMATED COUNT: 13 % (ref 12.3–15.4)
FOLATE SERPL-MCNC: >20 NG/ML (ref 4.78–24.2)
GFR SERPL CREATININE-BSD FRML MDRD: 44 ML/MIN/1.73
GLOBULIN UR ELPH-MCNC: 2.7 GM/DL
GLUCOSE SERPL-MCNC: 100 MG/DL (ref 65–99)
HCT VFR BLD AUTO: 44.6 % (ref 34–46.6)
HDLC SERPL-MCNC: 26 MG/DL (ref 40–60)
HGB BLD-MCNC: 15.4 G/DL (ref 12–15.9)
IMM GRANULOCYTES # BLD AUTO: 0.03 10*3/MM3 (ref 0–0.05)
IMM GRANULOCYTES NFR BLD AUTO: 0.4 % (ref 0–0.5)
LDLC SERPL CALC-MCNC: 130 MG/DL (ref 0–100)
LDLC/HDLC SERPL: 4.75 {RATIO}
LYMPHOCYTES # BLD AUTO: 2.15 10*3/MM3 (ref 0.7–3.1)
LYMPHOCYTES NFR BLD AUTO: 26.7 % (ref 19.6–45.3)
MCH RBC QN AUTO: 32.3 PG (ref 26.6–33)
MCHC RBC AUTO-ENTMCNC: 34.5 G/DL (ref 31.5–35.7)
MCV RBC AUTO: 93.5 FL (ref 79–97)
MONOCYTES # BLD AUTO: 0.57 10*3/MM3 (ref 0.1–0.9)
MONOCYTES NFR BLD AUTO: 7.1 % (ref 5–12)
NEUTROPHILS NFR BLD AUTO: 5.21 10*3/MM3 (ref 1.7–7)
NEUTROPHILS NFR BLD AUTO: 64.5 % (ref 42.7–76)
NRBC BLD AUTO-RTO: 0 /100 WBC (ref 0–0.2)
PLATELET # BLD AUTO: 181 10*3/MM3 (ref 140–450)
PMV BLD AUTO: 12.3 FL (ref 6–12)
POTASSIUM SERPL-SCNC: 5.2 MMOL/L (ref 3.5–5.2)
PROT SERPL-MCNC: 7.1 G/DL (ref 6–8.5)
RBC # BLD AUTO: 4.77 10*6/MM3 (ref 3.77–5.28)
SODIUM SERPL-SCNC: 139 MMOL/L (ref 136–145)
T4 FREE SERPL-MCNC: 1.2 NG/DL (ref 0.93–1.7)
TRIGL SERPL-MCNC: 312 MG/DL (ref 0–150)
TSH SERPL DL<=0.05 MIU/L-ACNC: 4.96 UIU/ML (ref 0.27–4.2)
VLDLC SERPL-MCNC: 56 MG/DL (ref 5–40)
WBC NRBC COR # BLD: 8.06 10*3/MM3 (ref 3.4–10.8)

## 2021-12-08 PROCEDURE — 36415 COLL VENOUS BLD VENIPUNCTURE: CPT

## 2021-12-08 PROCEDURE — 82746 ASSAY OF FOLIC ACID SERUM: CPT

## 2021-12-08 PROCEDURE — 85025 COMPLETE CBC W/AUTO DIFF WBC: CPT

## 2021-12-08 PROCEDURE — 84439 ASSAY OF FREE THYROXINE: CPT

## 2021-12-08 PROCEDURE — 84443 ASSAY THYROID STIM HORMONE: CPT

## 2021-12-08 PROCEDURE — 80053 COMPREHEN METABOLIC PANEL: CPT

## 2021-12-08 PROCEDURE — 80061 LIPID PANEL: CPT

## 2021-12-09 DIAGNOSIS — N18.32 STAGE 3B CHRONIC KIDNEY DISEASE (HCC): ICD-10-CM

## 2021-12-09 DIAGNOSIS — E03.9 ACQUIRED HYPOTHYROIDISM: Primary | ICD-10-CM

## 2022-02-07 DIAGNOSIS — E03.9 HYPOTHYROIDISM, UNSPECIFIED TYPE: ICD-10-CM

## 2022-02-08 RX ORDER — LEVOTHYROXINE SODIUM 0.1 MG/1
TABLET ORAL
Qty: 90 TABLET | Refills: 1 | Status: SHIPPED | OUTPATIENT
Start: 2022-02-08 | End: 2022-10-03

## 2022-02-15 ENCOUNTER — OFFICE VISIT (OUTPATIENT)
Dept: FAMILY MEDICINE CLINIC | Facility: CLINIC | Age: 77
End: 2022-02-15

## 2022-02-15 ENCOUNTER — HOSPITAL ENCOUNTER (OUTPATIENT)
Dept: GENERAL RADIOLOGY | Facility: HOSPITAL | Age: 77
Discharge: HOME | End: 2022-02-15
Admitting: STUDENT IN AN ORGANIZED HEALTH CARE EDUCATION/TRAINING PROGRAM

## 2022-02-15 VITALS
WEIGHT: 208 LBS | OXYGEN SATURATION: 90 % | DIASTOLIC BLOOD PRESSURE: 80 MMHG | SYSTOLIC BLOOD PRESSURE: 130 MMHG | RESPIRATION RATE: 18 BRPM | HEIGHT: 67 IN | BODY MASS INDEX: 32.65 KG/M2 | HEART RATE: 75 BPM | TEMPERATURE: 97.1 F

## 2022-02-15 DIAGNOSIS — G89.29 CHRONIC LEFT HIP PAIN: ICD-10-CM

## 2022-02-15 DIAGNOSIS — F99 ABNORMAL MINI-MENTAL STATUS EXAM: ICD-10-CM

## 2022-02-15 DIAGNOSIS — N18.32 STAGE 3B CHRONIC KIDNEY DISEASE: ICD-10-CM

## 2022-02-15 DIAGNOSIS — M25.552 CHRONIC LEFT HIP PAIN: ICD-10-CM

## 2022-02-15 DIAGNOSIS — E03.9 ACQUIRED HYPOTHYROIDISM: ICD-10-CM

## 2022-02-15 DIAGNOSIS — Z85.3 HISTORY OF BREAST CANCER: ICD-10-CM

## 2022-02-15 DIAGNOSIS — Z00.00 MEDICARE ANNUAL WELLNESS VISIT, SUBSEQUENT: Primary | ICD-10-CM

## 2022-02-15 DIAGNOSIS — Z23 NEED FOR VACCINATION FOR STREP PNEUMONIAE: ICD-10-CM

## 2022-02-15 PROCEDURE — 90732 PPSV23 VACC 2 YRS+ SUBQ/IM: CPT | Performed by: STUDENT IN AN ORGANIZED HEALTH CARE EDUCATION/TRAINING PROGRAM

## 2022-02-15 PROCEDURE — 1170F FXNL STATUS ASSESSED: CPT | Performed by: STUDENT IN AN ORGANIZED HEALTH CARE EDUCATION/TRAINING PROGRAM

## 2022-02-15 PROCEDURE — 73521 X-RAY EXAM HIPS BI 2 VIEWS: CPT

## 2022-02-15 PROCEDURE — 1160F RVW MEDS BY RX/DR IN RCRD: CPT | Performed by: STUDENT IN AN ORGANIZED HEALTH CARE EDUCATION/TRAINING PROGRAM

## 2022-02-15 PROCEDURE — G0009 ADMIN PNEUMOCOCCAL VACCINE: HCPCS | Performed by: STUDENT IN AN ORGANIZED HEALTH CARE EDUCATION/TRAINING PROGRAM

## 2022-02-15 PROCEDURE — G0439 PPPS, SUBSEQ VISIT: HCPCS | Performed by: STUDENT IN AN ORGANIZED HEALTH CARE EDUCATION/TRAINING PROGRAM

## 2022-02-15 NOTE — PROGRESS NOTES
The ABCs of the Annual Wellness Visit  Subsequent Medicare Wellness Visit    No chief complaint on file.     Subjective    History of Present Illness:  Sue Simmons is a 76 y.o. female who presents for a Subsequent Medicare Wellness Visit.    Patient is here for Medicare annual wellness and complaining of chronic worsening of left hip pain.  Patient reports worsening pain on certain days especially after standing up on feet for long time.  Denies any weakness/numbness or tingling or any radiating symptoms.    Daughter present in the room who reports history of Alzheimer with the patient.  Seen by neurologist in the past and had complete evaluation.  Patient does not want to see any neurologist, as per daughter patient is kind of independent and lives by herself.    The following portions of the patient's history were reviewed and   updated as appropriate: allergies, current medications, past family history, past medical history, past social history, past surgical history and problem list.    Compared to one year ago, the patient feels her physical   health is the same.    Compared to one year ago, the patient feels her mental   health is the same.    Recent Hospitalizations:  She was not admitted to the hospital during the last year.       Current Medical Providers:  Patient Care Team:  Ej Santoyo MD as PCP - General (Family Medicine)    Outpatient Medications Prior to Visit   Medication Sig Dispense Refill   • levothyroxine (SYNTHROID, LEVOTHROID) 100 MCG tablet TAKE 1 TABLET(100 MCG) BY MOUTH EVERY DAY 90 tablet 1   • multivitamin with minerals (multivitamin with minerals) tablet tablet Take 1 tablet by mouth Daily.     • spironolactone (ALDACTONE) 25 MG tablet TAKE 1 TABLET BY MOUTH DAILY 90 tablet 1     No facility-administered medications prior to visit.       No opioid medication identified on active medication list. I have reviewed chart for other potential  high risk medication/s and harmful drug  "interactions in the elderly.          Aspirin is not on active medication list.  Aspirin use is not indicated based on review of current medical condition/s. Risk of harm outweighs potential benefits.  .    Patient Active Problem List   Diagnosis   • Acquired hypothyroidism   • Chronic left hip pain   • Stage 3b chronic kidney disease (HCC)   • History of breast cancer     Advance Care Planning  Advance Directive is on file.  ACP discussion was held with the patient during this visit. Patient has an advance directive in EMR which is still valid.           Objective    Vitals:    02/15/22 1422   BP: 130/80   Pulse: 75   Resp: 18   Temp: 97.1 °F (36.2 °C)   SpO2: 90%   Weight: 94.3 kg (208 lb)   Height: 170.2 cm (67\")     BMI Readings from Last 1 Encounters:   02/15/22 32.58 kg/m²   BMI is above normal parameters. Recommendations include: educational material and nutrition counseling    Does the patient have evidence of cognitive impairment? No    Physical Exam  Constitutional:       General: She is awake.   Neurological:      Mental Status: She is alert.   Psychiatric:         Behavior: Behavior is cooperative.       Lab Results   Component Value Date    TRIG 312 (H) 12/08/2021    HDL 26 (L) 12/08/2021     (H) 12/08/2021    VLDL 56 (H) 12/08/2021            HEALTH RISK ASSESSMENT    Smoking Status:  Social History     Tobacco Use   Smoking Status Former Smoker   • Types: Cigarettes   Smokeless Tobacco Never Used   Tobacco Comment    quit at age 59, second hand smoke exposure- never     Alcohol Consumption:  Social History     Substance and Sexual Activity   Alcohol Use Yes    Comment: light     Fall Risk Screen:    STEADI Fall Risk Assessment was completed, and patient is at LOW risk for falls.Assessment completed on:2/15/2022    Depression Screening:  PHQ-2/PHQ-9 Depression Screening 2/15/2022   Little interest or pleasure in doing things 0   Feeling down, depressed, or hopeless 0   Total Score 0       Health " Habits and Functional and Cognitive Screening:  Functional & Cognitive Status 2/15/2022   Do you have difficulty preparing food and eating? No   Do you have difficulty bathing yourself, getting dressed or grooming yourself? No   Do you have difficulty using the toilet? No   Do you have difficulty moving around from place to place? No   Do you have trouble with steps or getting out of a bed or a chair? No   Current Diet Limited Junk Food   Dental Exam Up to date   Eye Exam Up to date   Exercise (times per week) 0 times per week   Current Exercises Include No Regular Exercise   Do you need help using the phone?  No   Are you deaf or do you have serious difficulty hearing?  No   Do you need help with transportation? No   Do you need help shopping? No   Do you need help preparing meals?  No   Do you need help with housework?  No   Do you need help with laundry? No   Do you need help taking your medications? No   Do you need help managing money? No   Do you ever drive or ride in a car without wearing a seat belt? Yes   Have you felt unusual stress, anger or loneliness in the last month? No   Who do you live with? Alone   If you need help, do you have trouble finding someone available to you? No   Have you been bothered in the last four weeks by sexual problems? No   Do you have difficulty concentrating, remembering or making decisions? Yes       Age-appropriate Screening Schedule:  Refer to the list below for future screening recommendations based on patient's age, sex and/or medical conditions. Orders for these recommended tests are listed in the plan section. The patient has been provided with a written plan.    Health Maintenance   Topic Date Due   • DXA SCAN  09/05/2019   • ZOSTER VACCINE (2 of 2) 11/22/2021   • TDAP/TD VACCINES (2 - Td or Tdap) 11/07/2027   • INFLUENZA VACCINE  Completed              Assessment/Plan   CMS Preventative Services Quick Reference  Risk Factors Identified During Encounter  Hearing  Problem  Immunizations Discussed/Encouraged (specific Immunizations; Pneumococcal 23 and Shingrix  The above risks/problems have been discussed with the patient.  Follow up actions/plans if indicated are seen below in the Assessment/Plan Section.  Pertinent information has been shared with the patient in the After Visit Summary.    Diagnoses and all orders for this visit:    1. Medicare annual wellness visit, subsequent (Primary)    2. Need for vaccination for Strep pneumoniae  -     pneumococcal polysaccharide 23-valent (PNEUMOVAX-23) vaccine 0.5 mL    3. Chronic left hip pain  Comments:  OTC Tylenol/diclofenac gel discussed, will get x-ray and orthopedic if needed  Orders:  -     XR Hips Bilateral With or Without Pelvis 2 View; Future  -     Diclofenac Sodium (VOLTAREN) 1 % gel gel; Apply 4 g topically to the appropriate area as directed 2 (Two) Times a Day.  Dispense: 100 g; Refill: 0    4. Acquired hypothyroidism    5. Stage 3b chronic kidney disease (HCC)    6. History of breast cancer    7. Abnormal mini-mental status exam  Comments:  Score 23, patient was seen by neurologist in the past as per daughter.  Declined new neurology referral.  Or further evaluation.        Follow Up:   Return in about 6 months (around 8/15/2022) for Recheck, Next scheduled follow up.     An After Visit Summary and PPPS were made available to the patient.

## 2022-03-07 ENCOUNTER — HOSPITAL ENCOUNTER (OUTPATIENT)
Dept: BONE DENSITY | Facility: HOSPITAL | Age: 77
Discharge: HOME | End: 2022-03-07

## 2022-03-07 ENCOUNTER — HOSPITAL ENCOUNTER (OUTPATIENT)
Dept: MAMMOGRAPHY | Facility: HOSPITAL | Age: 77
Discharge: HOME | End: 2022-03-07

## 2022-03-07 PROCEDURE — 77080 DXA BONE DENSITY AXIAL: CPT

## 2022-03-07 PROCEDURE — 77067 SCR MAMMO BI INCL CAD: CPT

## 2022-03-07 PROCEDURE — 77063 BREAST TOMOSYNTHESIS BI: CPT

## 2022-07-06 ENCOUNTER — TELEPHONE (OUTPATIENT)
Dept: FAMILY MEDICINE CLINIC | Facility: CLINIC | Age: 77
End: 2022-07-06

## 2022-07-06 RX ORDER — SPIRONOLACTONE 25 MG/1
25 TABLET ORAL DAILY
Qty: 90 TABLET | Refills: 1 | Status: SHIPPED | OUTPATIENT
Start: 2022-07-06 | End: 2022-10-04

## 2022-07-06 NOTE — TELEPHONE ENCOUNTER
Caller: ANEUDY ROUSE    Relationship: Emergency Contact    Best call back number: [phone]    Requested Prescriptions:   Requested Prescriptions     Pending Prescriptions Disp Refills   • spironolactone (ALDACTONE) 25 MG tablet 90 tablet 1     Sig: Take 1 tablet by mouth Daily.        Pharmacy where request should be sent: [id number] [place] [facility] [address] [facility] AT [facility] [phone] [facility] [phone] FX       Does the patient have less than a 3 day supply:  [x] Yes  [] No    Eda Inman Rep   07/06/22 12:27 EDT

## 2022-08-12 ENCOUNTER — TELEPHONE (OUTPATIENT)
Dept: FAMILY MEDICINE CLINIC | Facility: CLINIC | Age: 77
End: 2022-08-12

## 2022-08-12 NOTE — TELEPHONE ENCOUNTER
AMAYA TO SHARE     Called patient to let her know her cologuard order had  and was inquiring to see if she would like DR. Santoyo to order her a new one.

## 2022-10-01 DIAGNOSIS — E03.9 HYPOTHYROIDISM, UNSPECIFIED TYPE: ICD-10-CM

## 2022-10-03 DIAGNOSIS — E03.9 HYPOTHYROIDISM, UNSPECIFIED TYPE: ICD-10-CM

## 2022-10-03 RX ORDER — LEVOTHYROXINE SODIUM 0.1 MG/1
TABLET ORAL
Qty: 90 TABLET | Refills: 1 | Status: SHIPPED | OUTPATIENT
Start: 2022-10-03 | End: 2023-01-16

## 2022-10-03 RX ORDER — LEVOTHYROXINE SODIUM 0.1 MG/1
TABLET ORAL
Qty: 90 TABLET | Refills: 1 | OUTPATIENT
Start: 2022-10-03

## 2022-10-04 RX ORDER — SPIRONOLACTONE 25 MG/1
25 TABLET ORAL DAILY
Qty: 90 TABLET | Refills: 1 | Status: SHIPPED | OUTPATIENT
Start: 2022-10-04 | End: 2023-01-11

## 2022-11-21 ENCOUNTER — TELEPHONE (OUTPATIENT)
Dept: FAMILY MEDICINE CLINIC | Facility: CLINIC | Age: 77
End: 2022-11-21

## 2022-11-21 NOTE — TELEPHONE ENCOUNTER
Patient's daughter was calling about patient's feet swelling. Patient took her medication but the swelling is not going down. Tianna would like to know if she would be able to take another tablet or half a tablet. Tianna is aware Dr Santoyo is out of office today but if able to get an answer she would like a call back at [phone] and press 1.

## 2022-11-22 ENCOUNTER — OFFICE VISIT (OUTPATIENT)
Dept: FAMILY MEDICINE CLINIC | Facility: CLINIC | Age: 77
End: 2022-11-22

## 2022-11-22 VITALS
WEIGHT: 224.6 LBS | RESPIRATION RATE: 19 BRPM | HEART RATE: 81 BPM | SYSTOLIC BLOOD PRESSURE: 120 MMHG | HEIGHT: 67 IN | DIASTOLIC BLOOD PRESSURE: 68 MMHG | BODY MASS INDEX: 35.25 KG/M2 | OXYGEN SATURATION: 99 % | TEMPERATURE: 97.8 F

## 2022-11-22 DIAGNOSIS — Z85.3 HISTORY OF BREAST CANCER: ICD-10-CM

## 2022-11-22 DIAGNOSIS — E03.9 ACQUIRED HYPOTHYROIDISM: ICD-10-CM

## 2022-11-22 DIAGNOSIS — M79.89 SWELLING OF LOWER EXTREMITY: ICD-10-CM

## 2022-11-22 DIAGNOSIS — M79.89 SWELLING OF LOWER EXTREMITY: Primary | ICD-10-CM

## 2022-11-22 DIAGNOSIS — Z23 FLU VACCINE NEED: ICD-10-CM

## 2022-11-22 DIAGNOSIS — N18.32 STAGE 3B CHRONIC KIDNEY DISEASE: ICD-10-CM

## 2022-11-22 PROCEDURE — 99214 OFFICE O/P EST MOD 30 MIN: CPT | Performed by: STUDENT IN AN ORGANIZED HEALTH CARE EDUCATION/TRAINING PROGRAM

## 2022-11-22 PROCEDURE — G0008 ADMIN INFLUENZA VIRUS VAC: HCPCS | Performed by: STUDENT IN AN ORGANIZED HEALTH CARE EDUCATION/TRAINING PROGRAM

## 2022-11-22 PROCEDURE — 90662 IIV NO PRSV INCREASED AG IM: CPT | Performed by: STUDENT IN AN ORGANIZED HEALTH CARE EDUCATION/TRAINING PROGRAM

## 2022-11-22 RX ORDER — FUROSEMIDE 40 MG/1
TABLET ORAL
Qty: 90 TABLET | Refills: 1 | Status: SHIPPED | OUTPATIENT
Start: 2022-11-22 | End: 2023-03-31 | Stop reason: SDUPTHER

## 2022-11-22 RX ORDER — FUROSEMIDE 40 MG/1
TABLET ORAL
Qty: 90 TABLET | OUTPATIENT
Start: 2022-11-22

## 2022-11-22 RX ORDER — FUROSEMIDE 40 MG/1
40 TABLET ORAL DAILY PRN
Qty: 30 TABLET | Refills: 1 | Status: SHIPPED | OUTPATIENT
Start: 2022-11-22 | End: 2022-11-22

## 2022-11-22 NOTE — PROGRESS NOTES
"Chief Complaint  Lower extremity swelling and hypothyroid    Subjective         Sue Simmons is a 77 y.o. female who presents to [facility]      77 years old comes to the clinic today for lower extremity swelling and hypothyroid.    Chronic lower extremity swelling intermittently, has gotten mildly worse without any significant pain or other concerns.     Daughter is present in the room.  Denies any chest pain or shortness of breath on exertion.  Compliant with spironolactone and levothyroxine.    Objective   Vital Signs:   Vitals:    11/22/22 1302   BP: 120/68   Pulse: 81   Resp: 19   Temp: 97.8 °F (36.6 °C)   SpO2: 99%   Weight: 102 kg (224 lb 9.6 oz)   Height: 170.2 cm (67\")      Body mass index is 35.18 kg/m².   Wt Readings from Last 3 Encounters:   11/22/22 102 kg (224 lb 9.6 oz)   02/15/22 94.3 kg (208 lb)   08/11/21 92.5 kg (203 lb 14.4 oz)      BP Readings from Last 3 Encounters:   11/22/22 120/68   02/15/22 130/80   08/11/21 112/76        Patient Care Team:  Ej Santoyo MD as PCP - General (Family Medicine)     Physical Exam  Vitals reviewed.   Constitutional:       Appearance: Normal appearance. She is well-developed.   HENT:      Head: Normocephalic and atraumatic.      Right Ear: External ear normal.      Left Ear: External ear normal.      Mouth/Throat:      Pharynx: No oropharyngeal exudate.   Eyes:      Conjunctiva/sclera: Conjunctivae normal.      Pupils: Pupils are equal, round, and reactive to light.   Cardiovascular:      Rate and Rhythm: Normal rate and regular rhythm.      Heart sounds: No murmur heard.    No friction rub. No gallop.   Pulmonary:      Effort: Pulmonary effort is normal.      Breath sounds: Normal breath sounds. No wheezing or rhonchi.   Abdominal:      General: Bowel sounds are normal. There is no distension.      Palpations: Abdomen is soft.      Tenderness: There is no abdominal tenderness.   Musculoskeletal:      Comments: +1 lower " extremity swelling   Skin:     General: Skin is warm and dry.   Neurological:      Mental Status: She is alert and oriented to person, place, and time.      Cranial Nerves: No cranial nerve deficit.   Psychiatric:         Mood and Affect: Mood and affect normal.         Behavior: Behavior normal.         Thought Content: Thought content normal.         Judgment: Judgment normal.                       Assessment and Plan   Diagnoses and all orders for this visit:    1. Swelling of lower extremity (Primary)  Comments:  Conservative management with low-salt diet/elevation/compression stocking, Lasix as needed discussed.  Monitor blood pressure  Orders:  -     Discontinue: furosemide (Lasix) 40 MG tablet; Take 1 tablet by mouth Daily As Needed ([place]).  Dispense: 30 tablet; Refill: 1  -     TSH Rfx On Abnormal To Free T4  -     Comprehensive metabolic panel; Future  -     Discontinue: furosemide (Lasix) 40 MG tablet; Take 1 tablet by mouth Daily As Needed ([place]).  Dispense: 30 tablet; Refill: 1    2. Acquired hypothyroidism  Comments:  New blood work needed  Orders:  -     TSH Rfx On Abnormal To Free T4    3. Stage 3b chronic kidney disease (HCC)  Comments:  Repeat blood work needed on Lasix    4. History of breast cancer    5. Flu vaccine need  -     Fluzone High-Dose 65+yrs (2491-9229)          Tobacco Use: Medium Risk   • Smoking Tobacco Use: Former   • Smokeless Tobacco Use: Never   • Passive Exposure: Not on file            Follow Up   Return in about 3 months (around 2/22/2023) for Recheck, Next scheduled follow up.  Patient was given instructions and counseling regarding her condition or for health maintenance advice. Please see specific information pulled into the AVS if appropriate.

## 2022-11-26 ENCOUNTER — APPOINTMENT (OUTPATIENT)
Dept: CT IMAGING | Facility: HOSPITAL | Age: 77
End: 2022-11-26

## 2022-11-26 ENCOUNTER — HOSPITAL ENCOUNTER (EMERGENCY)
Facility: HOSPITAL | Age: 77
Discharge: HOME | End: 2022-11-26
Attending: EMERGENCY MEDICINE | Admitting: EMERGENCY MEDICINE

## 2022-11-26 ENCOUNTER — APPOINTMENT (OUTPATIENT)
Dept: GENERAL RADIOLOGY | Facility: HOSPITAL | Age: 77
End: 2022-11-26

## 2022-11-26 VITALS
HEART RATE: 71 BPM | TEMPERATURE: 98.1 F | OXYGEN SATURATION: 96 % | HEIGHT: 68 IN | DIASTOLIC BLOOD PRESSURE: 72 MMHG | SYSTOLIC BLOOD PRESSURE: 124 MMHG | WEIGHT: 227.29 LBS | BODY MASS INDEX: 34.45 KG/M2 | RESPIRATION RATE: 16 BRPM

## 2022-11-26 DIAGNOSIS — R60.0 PEDAL EDEMA: Primary | ICD-10-CM

## 2022-11-26 LAB
ALBUMIN SERPL-MCNC: 4 G/DL (ref 3.5–5.2)
ALBUMIN/GLOB SERPL: 1.3 G/DL
ALP SERPL-CCNC: 42 U/L (ref 39–117)
ALT SERPL W P-5'-P-CCNC: 23 U/L (ref 1–33)
ANION GAP SERPL CALCULATED.3IONS-SCNC: 12 MMOL/L (ref 5–15)
AST SERPL-CCNC: 33 U/L (ref 1–32)
BACTERIA UR QL AUTO: ABNORMAL /HPF
BASOPHILS # BLD AUTO: 0.01 10*3/MM3 (ref 0–0.2)
BASOPHILS NFR BLD AUTO: 0.2 % (ref 0–1.5)
BILIRUB SERPL-MCNC: 0.5 MG/DL (ref 0–1.2)
BILIRUB UR QL STRIP: NEGATIVE
BUN SERPL-MCNC: 19 MG/DL (ref 8–23)
BUN/CREAT SERPL: 16 (ref 7–25)
CALCIUM SPEC-SCNC: 9.2 MG/DL (ref 8.6–10.5)
CHLORIDE SERPL-SCNC: 99 MMOL/L (ref 98–107)
CLARITY UR: CLEAR
CO2 SERPL-SCNC: 25 MMOL/L (ref 22–29)
COLOR UR: YELLOW
CREAT SERPL-MCNC: 1.19 MG/DL (ref 0.57–1)
DEPRECATED RDW RBC AUTO: 48.1 FL (ref 37–54)
EGFRCR SERPLBLD CKD-EPI 2021: 46.9 ML/MIN/1.73
EOSINOPHIL # BLD AUTO: 0.01 10*3/MM3 (ref 0–0.4)
EOSINOPHIL NFR BLD AUTO: 0.2 % (ref 0.3–6.2)
ERYTHROCYTE [DISTWIDTH] IN BLOOD BY AUTOMATED COUNT: 14 % (ref 12.3–15.4)
GLOBULIN UR ELPH-MCNC: 3 GM/DL
GLUCOSE SERPL-MCNC: 120 MG/DL (ref 65–99)
GLUCOSE UR STRIP-MCNC: NEGATIVE MG/DL
HCT VFR BLD AUTO: 39.7 % (ref 34–46.6)
HGB BLD-MCNC: 13.2 G/DL (ref 12–15.9)
HGB UR QL STRIP.AUTO: ABNORMAL
HOLD SPECIMEN: NORMAL
HOLD SPECIMEN: NORMAL
HYALINE CASTS UR QL AUTO: ABNORMAL /LPF
IMM GRANULOCYTES # BLD AUTO: 0.02 10*3/MM3 (ref 0–0.05)
IMM GRANULOCYTES NFR BLD AUTO: 0.4 % (ref 0–0.5)
KETONES UR QL STRIP: NEGATIVE
LEUKOCYTE ESTERASE UR QL STRIP.AUTO: NEGATIVE
LYMPHOCYTES # BLD AUTO: 0.78 10*3/MM3 (ref 0.7–3.1)
LYMPHOCYTES NFR BLD AUTO: 16.5 % (ref 19.6–45.3)
MAGNESIUM SERPL-MCNC: 1.8 MG/DL (ref 1.6–2.4)
MCH RBC QN AUTO: 31.4 PG (ref 26.6–33)
MCHC RBC AUTO-ENTMCNC: 33.2 G/DL (ref 31.5–35.7)
MCV RBC AUTO: 94.5 FL (ref 79–97)
MONOCYTES # BLD AUTO: 0.76 10*3/MM3 (ref 0.1–0.9)
MONOCYTES NFR BLD AUTO: 16.1 % (ref 5–12)
NEUTROPHILS NFR BLD AUTO: 3.15 10*3/MM3 (ref 1.7–7)
NEUTROPHILS NFR BLD AUTO: 66.6 % (ref 42.7–76)
NITRITE UR QL STRIP: NEGATIVE
NRBC BLD AUTO-RTO: 0 /100 WBC (ref 0–0.2)
NT-PROBNP SERPL-MCNC: 323.2 PG/ML (ref 0–1800)
PH UR STRIP.AUTO: 5.5 [PH] (ref 5–8)
PLATELET # BLD AUTO: 160 10*3/MM3 (ref 140–450)
PMV BLD AUTO: 10.9 FL (ref 6–12)
POTASSIUM SERPL-SCNC: 3.8 MMOL/L (ref 3.5–5.2)
PROT SERPL-MCNC: 7 G/DL (ref 6–8.5)
PROT UR QL STRIP: ABNORMAL
RBC # BLD AUTO: 4.2 10*6/MM3 (ref 3.77–5.28)
RBC # UR STRIP: ABNORMAL /HPF
REF LAB TEST METHOD: ABNORMAL
SODIUM SERPL-SCNC: 136 MMOL/L (ref 136–145)
SP GR UR STRIP: 1.01 (ref 1–1.03)
SQUAMOUS #/AREA URNS HPF: ABNORMAL /HPF
TROPONIN T SERPL-MCNC: <0.01 NG/ML (ref 0–0.03)
UROBILINOGEN UR QL STRIP: ABNORMAL
WBC # UR STRIP: ABNORMAL /HPF
WBC NRBC COR # BLD: 4.73 10*3/MM3 (ref 3.4–10.8)
WHOLE BLOOD HOLD COAG: NORMAL
WHOLE BLOOD HOLD SPECIMEN: NORMAL

## 2022-11-26 PROCEDURE — 25010000002 FUROSEMIDE PER 20 MG: Performed by: EMERGENCY MEDICINE

## 2022-11-26 PROCEDURE — 85025 COMPLETE CBC W/AUTO DIFF WBC: CPT | Performed by: EMERGENCY MEDICINE

## 2022-11-26 PROCEDURE — 84484 ASSAY OF TROPONIN QUANT: CPT | Performed by: EMERGENCY MEDICINE

## 2022-11-26 PROCEDURE — 81001 URINALYSIS AUTO W/SCOPE: CPT | Performed by: EMERGENCY MEDICINE

## 2022-11-26 PROCEDURE — 93010 ELECTROCARDIOGRAM REPORT: CPT | Performed by: INTERNAL MEDICINE

## 2022-11-26 PROCEDURE — 93005 ELECTROCARDIOGRAM TRACING: CPT | Performed by: EMERGENCY MEDICINE

## 2022-11-26 PROCEDURE — 70450 CT HEAD/BRAIN W/O DYE: CPT

## 2022-11-26 PROCEDURE — 83880 ASSAY OF NATRIURETIC PEPTIDE: CPT | Performed by: EMERGENCY MEDICINE

## 2022-11-26 PROCEDURE — 80053 COMPREHEN METABOLIC PANEL: CPT | Performed by: EMERGENCY MEDICINE

## 2022-11-26 PROCEDURE — 99284 EMERGENCY DEPT VISIT MOD MDM: CPT

## 2022-11-26 PROCEDURE — 83735 ASSAY OF MAGNESIUM: CPT | Performed by: EMERGENCY MEDICINE

## 2022-11-26 PROCEDURE — 36415 COLL VENOUS BLD VENIPUNCTURE: CPT

## 2022-11-26 PROCEDURE — 71045 X-RAY EXAM CHEST 1 VIEW: CPT

## 2022-11-26 RX ORDER — FUROSEMIDE 40 MG/1
40 TABLET ORAL ONCE
Status: COMPLETED | OUTPATIENT
Start: 2022-11-26 | End: 2022-11-26

## 2022-11-26 RX ORDER — FUROSEMIDE 10 MG/ML
40 INJECTION INTRAMUSCULAR; INTRAVENOUS ONCE
Status: DISCONTINUED | OUTPATIENT
Start: 2022-11-26 | End: 2022-11-26 | Stop reason: HOSPADM

## 2022-11-26 RX ORDER — SODIUM CHLORIDE 0.9 % (FLUSH) 0.9 %
10 SYRINGE (ML) INJECTION AS NEEDED
Status: DISCONTINUED | OUTPATIENT
Start: 2022-11-26 | End: 2022-11-26 | Stop reason: HOSPADM

## 2022-11-26 RX ADMIN — FUROSEMIDE 40 MG: 40 TABLET ORAL at 19:27

## 2022-11-27 LAB — QT INTERVAL: 380 MS

## 2022-12-22 ENCOUNTER — OFFICE VISIT (OUTPATIENT)
Dept: FAMILY MEDICINE CLINIC | Facility: CLINIC | Age: 77
End: 2022-12-22

## 2022-12-22 VITALS
SYSTOLIC BLOOD PRESSURE: 124 MMHG | RESPIRATION RATE: 19 BRPM | BODY MASS INDEX: 34.4 KG/M2 | WEIGHT: 227 LBS | DIASTOLIC BLOOD PRESSURE: 76 MMHG | HEIGHT: 68 IN | TEMPERATURE: 97.8 F | OXYGEN SATURATION: 97 % | HEART RATE: 76 BPM

## 2022-12-22 DIAGNOSIS — M79.89 SWELLING OF LOWER EXTREMITY: ICD-10-CM

## 2022-12-22 DIAGNOSIS — R73.01 IMPAIRED FASTING BLOOD SUGAR: ICD-10-CM

## 2022-12-22 DIAGNOSIS — R41.89 COGNITIVE AND BEHAVIORAL CHANGES: Primary | ICD-10-CM

## 2022-12-22 DIAGNOSIS — N18.32 STAGE 3B CHRONIC KIDNEY DISEASE: ICD-10-CM

## 2022-12-22 DIAGNOSIS — E03.9 ACQUIRED HYPOTHYROIDISM: ICD-10-CM

## 2022-12-22 DIAGNOSIS — R46.89 COGNITIVE AND BEHAVIORAL CHANGES: Primary | ICD-10-CM

## 2022-12-22 PROCEDURE — 99213 OFFICE O/P EST LOW 20 MIN: CPT | Performed by: STUDENT IN AN ORGANIZED HEALTH CARE EDUCATION/TRAINING PROGRAM

## 2022-12-22 RX ORDER — QUETIAPINE FUMARATE 25 MG/1
25 TABLET, FILM COATED ORAL NIGHTLY
Qty: 30 TABLET | Refills: 1 | Status: SHIPPED | OUTPATIENT
Start: 2022-12-22 | End: 2023-01-13

## 2022-12-22 NOTE — PROGRESS NOTES
"Chief Complaint  Patient is here to follow-up on dementia/behavioral changes    Subjective         Sue Simmons is a 77 y.o. female who presents to [facility]    77 years old comes to the clinic today with daughter to follow-up.    Patient lives in assisted living facility/[facility]    Patient has dementia, occasionally she finds herself crying and getting irritated on small things as per daughter.  Patient reports no significant changes or distractions due to her current symptoms.  Denies any chest pain or shortness of breath, compliant with medications.    Patient does not want to see any neurologist or any psych.  Objective   Vital Signs:   Vitals:    12/22/22 1509   BP: 124/76   Pulse: 76   Resp: 19   Temp: 97.8 °F (36.6 °C)   SpO2: 97%   Weight: 103 kg (227 lb)   Height: 172.7 cm (68\")      Body mass index is 34.52 kg/m².   Wt Readings from Last 3 Encounters:   12/22/22 103 kg (227 lb)   11/26/22 103 kg (227 lb 4.7 oz)   11/22/22 102 kg (224 lb 9.6 oz)      BP Readings from Last 3 Encounters:   12/22/22 124/76   11/26/22 124/72   11/22/22 120/68        Patient Care Team:  Ej Santoyo MD as PCP - General (Family Medicine)     Physical Exam  Vitals reviewed.   Constitutional:       Appearance: Normal appearance. She is well-developed.   HENT:      Head: Normocephalic and atraumatic.      Right Ear: External ear normal.      Left Ear: External ear normal.      Mouth/Throat:      Pharynx: No oropharyngeal exudate.   Eyes:      Conjunctiva/sclera: Conjunctivae normal.      Pupils: Pupils are equal, round, and reactive to light.   Cardiovascular:      Rate and Rhythm: Normal rate and regular rhythm.      Heart sounds: No murmur heard.    No friction rub. No gallop.   Pulmonary:      Effort: Pulmonary effort is normal.      Breath sounds: Normal breath sounds. No wheezing or rhonchi.   Abdominal:      General: Bowel sounds are normal. There is no distension.      Palpations: " Abdomen is soft.      Tenderness: There is no abdominal tenderness.   Skin:     General: Skin is warm and dry.   Neurological:      Mental Status: She is alert and oriented to person, place, and time.      Cranial Nerves: No cranial nerve deficit.   Psychiatric:         Mood and Affect: Mood and affect normal.         Behavior: Behavior normal.         Thought Content: Thought content normal.         Judgment: Judgment normal.                       Assessment and Plan   Diagnoses and all orders for this visit:    1. Cognitive and behavioral changes (Primary)  Comments:  We will try patient on Seroquel, side effect profile and mortality risk discussed.  Orders:  -     TSH Rfx On Abnormal To Free T4; Future  -     Hemoglobin A1c; Future  -     Lipid Panel; Future  -     QUEtiapine (SEROquel) 25 MG tablet; Take 1 tablet by mouth Every Night.  Dispense: 30 tablet; Refill: 1    2. Acquired hypothyroidism  -     TSH Rfx On Abnormal To Free T4; Future  -     Hemoglobin A1c; Future  -     Lipid Panel; Future    3. Stage 3b chronic kidney disease (HCC)  -     TSH Rfx On Abnormal To Free T4; Future  -     Hemoglobin A1c; Future  -     Lipid Panel; Future    4. Swelling of lower extremity  -     TSH Rfx On Abnormal To Free T4; Future  -     Hemoglobin A1c; Future  -     Lipid Panel; Future    5. Impaired fasting blood sugar  -     TSH Rfx On Abnormal To Free T4; Future  -     Hemoglobin A1c; Future  -     Lipid Panel; Future          Tobacco Use: Medium Risk   • Smoking Tobacco Use: Former   • Smokeless Tobacco Use: Never   • Passive Exposure: Not on file            Follow Up   Return if symptoms worsen or fail to improve.  Patient was given instructions and counseling regarding her condition or for health maintenance advice. Please see specific information pulled into the AVS if appropriate.

## 2023-01-11 ENCOUNTER — OFFICE VISIT (OUTPATIENT)
Dept: FAMILY MEDICINE CLINIC | Facility: CLINIC | Age: 78
End: 2023-01-11
Payer: MEDICARE

## 2023-01-11 VITALS
HEART RATE: 84 BPM | DIASTOLIC BLOOD PRESSURE: 88 MMHG | RESPIRATION RATE: 19 BRPM | SYSTOLIC BLOOD PRESSURE: 126 MMHG | OXYGEN SATURATION: 98 % | TEMPERATURE: 98 F

## 2023-01-11 DIAGNOSIS — J34.89 NASAL DRAINAGE: ICD-10-CM

## 2023-01-11 DIAGNOSIS — J30.89 NON-SEASONAL ALLERGIC RHINITIS, UNSPECIFIED TRIGGER: ICD-10-CM

## 2023-01-11 DIAGNOSIS — N30.90 CYSTITIS: ICD-10-CM

## 2023-01-11 DIAGNOSIS — R31.0 GROSS HEMATURIA: Primary | ICD-10-CM

## 2023-01-11 LAB
BILIRUB BLD-MCNC: NEGATIVE MG/DL
CLARITY, POC: CLEAR
COLOR UR: YELLOW
EXPIRATION DATE: ABNORMAL
GLUCOSE UR STRIP-MCNC: NEGATIVE MG/DL
KETONES UR QL: NEGATIVE
LEUKOCYTE EST, POC: NEGATIVE
Lab: ABNORMAL
NITRITE UR-MCNC: NEGATIVE MG/ML
PH UR: 5 [PH] (ref 5–8)
PROT UR STRIP-MCNC: NEGATIVE MG/DL
RBC # UR STRIP: ABNORMAL /UL
SP GR UR: 1.01 (ref 1–1.03)
UROBILINOGEN UR QL: ABNORMAL

## 2023-01-11 PROCEDURE — 81003 URINALYSIS AUTO W/O SCOPE: CPT

## 2023-01-11 PROCEDURE — 87086 URINE CULTURE/COLONY COUNT: CPT

## 2023-01-11 PROCEDURE — 99213 OFFICE O/P EST LOW 20 MIN: CPT

## 2023-01-11 NOTE — PROGRESS NOTES
Sue Simmons presents to [facility] with complaints of blood in urine, sinus congestion and increased nasal drainage.      History of Present Illness  This is a 77-year-old female who presents to clinic with complaints of blood in urine and sinus congestion with increased nasal drainage.    Patient is accompanied visit with daughter.    Patient states that she is not having any symptoms, has not seen actual blood in her urine, but states that a worker at [facility] seen some blood left over and told that was not flushed, and thus became concerned, called the daughter and thus that is why the patient is here.  Patient denies any burning with urination, frequency of urination, no pelvic pain, no back pain.  No fever/chill/bodyaches.  States that she feels like she is very healthy down there, and unsure of where the blood came from.  Also denies any history of hemorrhoids, no blood in stool, has not had any other episodes.  Also denies any vaginal irritation/discharge, and unsure where symptoms could have started.    Patient also states that she recently moved into [facility] assisted living back in November, ever since that time, she has been battling some worsening nasal congestion with increased drainage.  The drainage is clear, states that she does not have any other associated symptoms, but is wondering what to do about this and why all of a sudden she has developed this for the past several months.  Also has been exposed to different viruses as her daughter did have had the flu and was around her, but patient never developed symptoms.    The following portions of the patient's history were personally reviewed and updated as appropriate: allergies, current medications, past medical history, past surgical history, past family history, and past social history.       Objective   Vital Signs:   /88   Pulse 84   Temp 98 °F (36.7 °C)   Resp 19   SpO2 98%     There is no  height or weight on file to calculate BMI.    All labs, imaging, test results, and specialty provider notes reviewed with patient.     Physical Exam  Vitals reviewed.   Constitutional:       Appearance: Normal appearance.   Pulmonary:      Effort: Pulmonary effort is normal.   Skin:     General: Skin is warm and dry.   Neurological:      General: No focal deficit present.      Mental Status: She is alert and oriented to person, place, and time.   Psychiatric:         Mood and Affect: Mood normal.         Behavior: Behavior normal.          Assessment and Plan:  Diagnoses and all orders for this visit:    1. Gross hematuria (Primary)    2. Non-seasonal allergic rhinitis, unspecified trigger    3. Nasal drainage      As far as the blood in the urine goes, we will go ahead and obtain a urine today and send off for culture just to make sure that there is no urinary tract infection.  Otherwise, patient states that she is never seen any blood in her urine, so we will continue to monitor thereafter and if symptoms worsen or fail to improve, we can work-up further at that time.  No history of kidney stones and no recent med changes.    As far as the nasal congestion and nasal drainage goes, I do think that patient because she is moved into a new environment, could have been exposed to some allergens that she has not been around before, so did recommend an over-the-counter antihistamine, Zyrtec, to start taking.  Discussed with her that if any other symptoms develop, worsening symptoms develop, any change to the color of her nasal drainage can consider further work-up and treatment at that time.    Follow Up:  No follow-ups on file.    Patient was given instructions and counseling regarding her condition or for health maintenance advice. Please see specific information pulled into the AVS if appropriate.

## 2023-01-13 DIAGNOSIS — R46.89 COGNITIVE AND BEHAVIORAL CHANGES: ICD-10-CM

## 2023-01-13 DIAGNOSIS — R41.89 COGNITIVE AND BEHAVIORAL CHANGES: ICD-10-CM

## 2023-01-13 LAB — BACTERIA SPEC AEROBE CULT: NO GROWTH

## 2023-01-13 RX ORDER — QUETIAPINE FUMARATE 25 MG/1
25 TABLET, FILM COATED ORAL NIGHTLY
Qty: 30 TABLET | Refills: 1 | Status: SHIPPED | OUTPATIENT
Start: 2023-01-13 | End: 2023-04-05

## 2023-01-14 DIAGNOSIS — E03.9 HYPOTHYROIDISM, UNSPECIFIED TYPE: ICD-10-CM

## 2023-01-16 RX ORDER — LEVOTHYROXINE SODIUM 0.1 MG/1
TABLET ORAL
Qty: 90 TABLET | Refills: 1 | Status: SHIPPED | OUTPATIENT
Start: 2023-01-16 | End: 2023-02-20

## 2023-02-17 ENCOUNTER — LAB (OUTPATIENT)
Dept: LAB | Facility: HOSPITAL | Age: 78
End: 2023-02-17
Payer: MEDICARE

## 2023-02-17 DIAGNOSIS — M79.89 SWELLING OF LOWER EXTREMITY: ICD-10-CM

## 2023-02-17 DIAGNOSIS — E03.9 ACQUIRED HYPOTHYROIDISM: ICD-10-CM

## 2023-02-17 DIAGNOSIS — N18.32 STAGE 3B CHRONIC KIDNEY DISEASE: ICD-10-CM

## 2023-02-17 DIAGNOSIS — R46.89 COGNITIVE AND BEHAVIORAL CHANGES: ICD-10-CM

## 2023-02-17 DIAGNOSIS — R73.01 IMPAIRED FASTING BLOOD SUGAR: ICD-10-CM

## 2023-02-17 DIAGNOSIS — R41.89 COGNITIVE AND BEHAVIORAL CHANGES: ICD-10-CM

## 2023-02-17 LAB
ALBUMIN SERPL-MCNC: 4.3 G/DL (ref 3.5–5.2)
ALBUMIN/GLOB SERPL: 1.6 G/DL
ALP SERPL-CCNC: 55 U/L (ref 39–117)
ALT SERPL W P-5'-P-CCNC: 18 U/L (ref 1–33)
ANION GAP SERPL CALCULATED.3IONS-SCNC: 11.5 MMOL/L (ref 5–15)
AST SERPL-CCNC: 19 U/L (ref 1–32)
BILIRUB SERPL-MCNC: 0.6 MG/DL (ref 0–1.2)
BUN SERPL-MCNC: 16 MG/DL (ref 8–23)
BUN/CREAT SERPL: 12.8 (ref 7–25)
CALCIUM SPEC-SCNC: 9.6 MG/DL (ref 8.6–10.5)
CHLORIDE SERPL-SCNC: 100 MMOL/L (ref 98–107)
CHOLEST SERPL-MCNC: 190 MG/DL (ref 0–200)
CO2 SERPL-SCNC: 25.5 MMOL/L (ref 22–29)
CREAT SERPL-MCNC: 1.25 MG/DL (ref 0.57–1)
EGFRCR SERPLBLD CKD-EPI 2021: 44.2 ML/MIN/1.73
GLOBULIN UR ELPH-MCNC: 2.7 GM/DL
GLUCOSE SERPL-MCNC: 139 MG/DL (ref 65–99)
HBA1C MFR BLD: 6.4 % (ref 4.8–5.6)
HDLC SERPL-MCNC: 27 MG/DL (ref 40–60)
LDLC SERPL CALC-MCNC: 114 MG/DL (ref 0–100)
LDLC/HDLC SERPL: 3.99 {RATIO}
POTASSIUM SERPL-SCNC: 3.9 MMOL/L (ref 3.5–5.2)
PROT SERPL-MCNC: 7 G/DL (ref 6–8.5)
SODIUM SERPL-SCNC: 137 MMOL/L (ref 136–145)
T4 FREE SERPL-MCNC: 1.04 NG/DL (ref 0.93–1.7)
TRIGL SERPL-MCNC: 277 MG/DL (ref 0–150)
TSH SERPL DL<=0.05 MIU/L-ACNC: 5.34 UIU/ML (ref 0.27–4.2)
VLDLC SERPL-MCNC: 49 MG/DL (ref 5–40)

## 2023-02-17 PROCEDURE — 83036 HEMOGLOBIN GLYCOSYLATED A1C: CPT

## 2023-02-17 PROCEDURE — 80053 COMPREHEN METABOLIC PANEL: CPT

## 2023-02-17 PROCEDURE — 84439 ASSAY OF FREE THYROXINE: CPT | Performed by: STUDENT IN AN ORGANIZED HEALTH CARE EDUCATION/TRAINING PROGRAM

## 2023-02-17 PROCEDURE — 36415 COLL VENOUS BLD VENIPUNCTURE: CPT

## 2023-02-17 PROCEDURE — 84443 ASSAY THYROID STIM HORMONE: CPT | Performed by: STUDENT IN AN ORGANIZED HEALTH CARE EDUCATION/TRAINING PROGRAM

## 2023-02-17 PROCEDURE — 80061 LIPID PANEL: CPT

## 2023-02-20 RX ORDER — LEVOTHYROXINE SODIUM 112 UG/1
112 TABLET ORAL DAILY
Qty: 90 TABLET | Refills: 1 | Status: SHIPPED | OUTPATIENT
Start: 2023-02-20

## 2023-02-22 ENCOUNTER — OFFICE VISIT (OUTPATIENT)
Dept: PODIATRY | Facility: CLINIC | Age: 78
End: 2023-02-22

## 2023-02-22 ENCOUNTER — OFFICE VISIT (OUTPATIENT)
Dept: FAMILY MEDICINE CLINIC | Facility: CLINIC | Age: 78
End: 2023-02-22
Payer: MEDICARE

## 2023-02-22 VITALS
OXYGEN SATURATION: 98 % | DIASTOLIC BLOOD PRESSURE: 95 MMHG | HEIGHT: 68 IN | SYSTOLIC BLOOD PRESSURE: 152 MMHG | HEART RATE: 88 BPM | BODY MASS INDEX: 36.09 KG/M2 | WEIGHT: 238.1 LBS | TEMPERATURE: 98.2 F

## 2023-02-22 VITALS
DIASTOLIC BLOOD PRESSURE: 72 MMHG | OXYGEN SATURATION: 99 % | BODY MASS INDEX: 36.07 KG/M2 | WEIGHT: 238 LBS | HEIGHT: 68 IN | HEART RATE: 87 BPM | SYSTOLIC BLOOD PRESSURE: 153 MMHG | TEMPERATURE: 97.3 F

## 2023-02-22 DIAGNOSIS — N18.32 STAGE 3B CHRONIC KIDNEY DISEASE: ICD-10-CM

## 2023-02-22 DIAGNOSIS — E03.9 ACQUIRED HYPOTHYROIDISM: ICD-10-CM

## 2023-02-22 DIAGNOSIS — L60.0 ONYCHOCRYPTOSIS: ICD-10-CM

## 2023-02-22 DIAGNOSIS — M79.672 FOOT PAIN, BILATERAL: Primary | ICD-10-CM

## 2023-02-22 DIAGNOSIS — M79.671 FOOT PAIN, BILATERAL: Primary | ICD-10-CM

## 2023-02-22 DIAGNOSIS — R20.2 NUMBNESS AND TINGLING OF LOWER EXTREMITY: ICD-10-CM

## 2023-02-22 DIAGNOSIS — L74.4 ANHIDROSIS: ICD-10-CM

## 2023-02-22 DIAGNOSIS — R20.0 NUMBNESS AND TINGLING OF LOWER EXTREMITY: ICD-10-CM

## 2023-02-22 DIAGNOSIS — B35.1 ONYCHOMYCOSIS: ICD-10-CM

## 2023-02-22 DIAGNOSIS — Z00.00 MEDICARE ANNUAL WELLNESS VISIT, SUBSEQUENT: Primary | ICD-10-CM

## 2023-02-22 DIAGNOSIS — Z23 NEED FOR PNEUMOCOCCAL VACCINATION: ICD-10-CM

## 2023-02-22 DIAGNOSIS — Z79.899 MEDICATION MANAGEMENT: ICD-10-CM

## 2023-02-22 PROBLEM — R31.29 HEMATURIA, MICROSCOPIC: Status: ACTIVE | Noted: 2023-02-22

## 2023-02-22 PROBLEM — C50.911 CANCER OF RIGHT BREAST (HCC): Status: ACTIVE | Noted: 2023-02-22

## 2023-02-22 PROBLEM — Z80.1 FAMILY HISTORY OF LUNG CANCER: Status: ACTIVE | Noted: 2023-02-22

## 2023-02-22 PROBLEM — E55.9 VITAMIN D DEFICIENCY: Status: ACTIVE | Noted: 2023-02-22

## 2023-02-22 PROBLEM — C80.1 CANCER: Status: ACTIVE | Noted: 2023-02-22

## 2023-02-22 PROBLEM — N30.10 IC (INTERSTITIAL CYSTITIS): Status: ACTIVE | Noted: 2023-02-22

## 2023-02-22 PROBLEM — Z82.49 FAMILY HISTORY OF CORONARY ARTERY DISEASE: Status: ACTIVE | Noted: 2023-02-22

## 2023-02-22 PROBLEM — Z83.49 FAMILY HISTORY OF THYROID DISEASE: Status: ACTIVE | Noted: 2023-02-22

## 2023-02-22 PROBLEM — K62.5 RECTAL BLEEDING: Status: ACTIVE | Noted: 2023-02-22

## 2023-02-22 PROBLEM — N63.0 BREAST LUMP: Status: ACTIVE | Noted: 2023-02-22

## 2023-02-22 PROBLEM — F39 MOOD DISORDER: Status: ACTIVE | Noted: 2023-02-22

## 2023-02-22 PROBLEM — K64.9 HEMORRHOIDS: Status: ACTIVE | Noted: 2023-02-22

## 2023-02-22 PROBLEM — D64.9 ANEMIA: Status: ACTIVE | Noted: 2023-02-22

## 2023-02-22 PROBLEM — M19.90 ARTHRITIS: Status: ACTIVE | Noted: 2023-02-22

## 2023-02-22 PROCEDURE — G0439 PPPS, SUBSEQ VISIT: HCPCS | Performed by: STUDENT IN AN ORGANIZED HEALTH CARE EDUCATION/TRAINING PROGRAM

## 2023-02-22 PROCEDURE — G0009 ADMIN PNEUMOCOCCAL VACCINE: HCPCS | Performed by: STUDENT IN AN ORGANIZED HEALTH CARE EDUCATION/TRAINING PROGRAM

## 2023-02-22 PROCEDURE — 99214 OFFICE O/P EST MOD 30 MIN: CPT | Performed by: STUDENT IN AN ORGANIZED HEALTH CARE EDUCATION/TRAINING PROGRAM

## 2023-02-22 PROCEDURE — 90677 PCV20 VACCINE IM: CPT | Performed by: STUDENT IN AN ORGANIZED HEALTH CARE EDUCATION/TRAINING PROGRAM

## 2023-02-22 PROCEDURE — 1170F FXNL STATUS ASSESSED: CPT | Performed by: STUDENT IN AN ORGANIZED HEALTH CARE EDUCATION/TRAINING PROGRAM

## 2023-02-22 PROCEDURE — 96160 PT-FOCUSED HLTH RISK ASSMT: CPT | Performed by: STUDENT IN AN ORGANIZED HEALTH CARE EDUCATION/TRAINING PROGRAM

## 2023-02-22 PROCEDURE — 99213 OFFICE O/P EST LOW 20 MIN: CPT | Performed by: PODIATRIST

## 2023-02-22 PROCEDURE — 11721 DEBRIDE NAIL 6 OR MORE: CPT | Performed by: PODIATRIST

## 2023-02-22 PROCEDURE — 1159F MED LIST DOCD IN RCRD: CPT | Performed by: STUDENT IN AN ORGANIZED HEALTH CARE EDUCATION/TRAINING PROGRAM

## 2023-02-22 RX ORDER — GABAPENTIN 100 MG/1
100 CAPSULE ORAL 2 TIMES DAILY
Qty: 90 CAPSULE | Refills: 1 | Status: SHIPPED | OUTPATIENT
Start: 2023-02-22

## 2023-02-22 RX ORDER — AMMONIUM LACTATE 12 G/100G
LOTION TOPICAL 2 TIMES DAILY
Qty: 225 G | Refills: 11 | Status: SHIPPED | OUTPATIENT
Start: 2023-02-22

## 2023-02-22 NOTE — PROGRESS NOTES
The ABCs of the Annual Wellness Visit  Subsequent Medicare Wellness Visit    Mary Simmons is a 77 y.o. female who presents for a Subsequent Medicare Wellness Visit.    The following portions of the patient's history were reviewed and   updated as appropriate: allergies, current medications, past family history, past medical history, past social history, past surgical history and problem list.    Compared to one year ago, the patient feels her physical   health is the same.    Compared to one year ago, the patient feels her mental   health is the same.    Recent Hospitalizations:  She was not admitted to the hospital during the last year.       Current Medical Providers:  Patient Care Team:  Ej Santoyo MD as PCP - General (Family Medicine)    Outpatient Medications Prior to Visit   Medication Sig Dispense Refill   • ammonium lactate (AmLactin) 12 % lotion Apply  topically to the appropriate area as directed 2 (Two) Times a Day. 225 g 11   • Diclofenac Sodium (VOLTAREN) 1 % gel gel Apply 4 g topically to the appropriate area as directed 2 (Two) Times a Day. 100 g 0   • furosemide (LASIX) 40 MG tablet TAKE 1 TABLET BY MOUTH EVERY DAY AS NEEDED FOR SWELLING 90 tablet 1   • levothyroxine (Synthroid) 112 MCG tablet Take 1 tablet by mouth Daily. 90 tablet 1   • QUEtiapine (SEROquel) 25 MG tablet TAKE 1 TABLET BY MOUTH EVERY NIGHT 30 tablet 1     No facility-administered medications prior to visit.       No opioid medication identified on active medication list. I have reviewed chart for other potential  high risk medication/s and harmful drug interactions in the elderly.          Aspirin is not on active medication list.  Aspirin use is not indicated based on review of current medical condition/s. Risk of harm outweighs potential benefits.  .    Patient Active Problem List   Diagnosis   • Acquired hypothyroidism   • Chronic left hip pain   • Stage 3b chronic kidney disease (HCC)   • History of breast  "cancer   • Anemia   • Arthritis   • Back pain with radiation   • Breast lump   • Cancer (HCC)   • Cancer of right breast (HCC)   • Family history of coronary artery disease   • Family history of lung cancer   • Family history of thyroid disease   • Foot pain, right   • Hematuria, microscopic   • Hemorrhoids   • IC (interstitial cystitis)   • Ingrown toenail   • Mood disorder (HCC)   • Rectal bleeding   • Varicose veins of lower extremities with inflammation   • Venous insufficiency (chronic) (peripheral)   • Vitamin D deficiency     Advance Care Planning  Advance Directive is on file.  ACP discussion was held with the patient during this visit. Patient has an advance directive in EMR which is still valid.      Objective    Vitals:    23 1402   BP: 152/95   BP Location: Left arm   Patient Position: Sitting   Cuff Size: Adult   Pulse: 88   Temp: 98.2 °F (36.8 °C)   TempSrc: Temporal   SpO2: 98%   Weight: 108 kg (238 lb 1.6 oz)   Height: 172.7 cm (67.99\")     Estimated body mass index is 36.21 kg/m² as calculated from the following:    Height as of this encounter: 172.7 cm (67.99\").    Weight as of this encounter: 108 kg (238 lb 1.6 oz).    Class 2 Severe Obesity (BMI >=35 and <=39.9). Obesity-related health conditions include the following: obstructive sleep apnea and hypertension. Obesity is unchanged. BMI is is above average; BMI management plan is completed. We discussed portion control and increasing exercise.      Does the patient have evidence of cognitive impairment? No    Lab Results   Component Value Date    TRIG 277 (H) 2023    HDL 27 (L) 2023     (H) 2023    VLDL 49 (H) 2023    HGBA1C 6.40 (H) 2023        HEALTH RISK ASSESSMENT    Smoking Status:  Social History     Tobacco Use   Smoking Status Former   • Years: 40.00   • Types: Cigarettes   • Quit date: 2022   • Years since quittin.6   Smokeless Tobacco Never   Tobacco Comments    quit at age 59, second " hand smoke exposure- never     Alcohol Consumption:  Social History     Substance and Sexual Activity   Alcohol Use Not Currently    Comment: light     Fall Risk Screen:    STEADI Fall Risk Assessment was completed, and patient is at MODERATE risk for falls. Assessment completed on:2/22/2023    Depression Screening:  PHQ-2/PHQ-9 Depression Screening 2/22/2023   Little Interest or Pleasure in Doing Things 0-->not at all   Feeling Down, Depressed or Hopeless 0-->not at all   PHQ-9: Brief Depression Severity Measure Score 0       Health Habits and Functional and Cognitive Screening:  Functional & Cognitive Status 2/22/2023   Do you have difficulty preparing food and eating? Yes   Do you have difficulty bathing yourself, getting dressed or grooming yourself? Yes   Do you have difficulty using the toilet? No   Do you have difficulty moving around from place to place? Yes   Do you have trouble with steps or getting out of a bed or a chair? Yes   Current Diet Well Balanced Diet   Dental Exam Up to date   Eye Exam Not up to date   Exercise (times per week) 0 times per week   Current Exercises Include No Regular Exercise   Do you need help using the phone?  Yes   Are you deaf or do you have serious difficulty hearing?  Yes   Do you need help with transportation? Yes   Do you need help shopping? Yes   Do you need help preparing meals?  Yes   Do you need help with housework?  Yes   Do you need help with laundry? Yes   Do you need help taking your medications? Yes   Do you need help managing money? Yes   Do you ever drive or ride in a car without wearing a seat belt? No   Have you felt unusual stress, anger or loneliness in the last month? No   Who do you live with? Community   If you need help, do you have trouble finding someone available to you? No   Have you been bothered in the last four weeks by sexual problems? No   Do you have difficulty concentrating, remembering or making decisions? Yes       Age-appropriate Screening  Schedule:  Refer to the list below for future screening recommendations based on patient's age, sex and/or medical conditions. Orders for these recommended tests are listed in the plan section. The patient has been provided with a written plan.    Health Maintenance   Topic Date Due   • HEPATITIS C SCREENING  Never done   • ZOSTER VACCINE (2 of 2) 02/22/2023 (Originally 11/22/2021)   • COVID-19 Vaccine (4 - Booster for Pfizer series) 02/24/2023 (Originally 1/4/2022)   • ANNUAL WELLNESS VISIT  02/22/2024   • DXA SCAN  03/07/2024   • TDAP/TD VACCINES (2 - Td or Tdap) 11/07/2027   • INFLUENZA VACCINE  Completed   • Pneumococcal Vaccine 65+  Completed                CMS Preventative Services Quick Reference  Risk Factors Identified During Encounter  Immunizations Discussed/Encouraged: Prevnar 20 (Pneumococcal 20-valent conjugate) and Shingrix  Dental Screening Recommended  Vision Screening Recommended  The above risks/problems have been discussed with the patient.  Pertinent information has been shared with the patient in the After Visit Summary.  An After Visit Summary and PPPS were made available to the patient.    Follow Up:   Next Medicare Wellness visit to be scheduled in 1 year.       Additional E&M Note during same encounter follows:  Patient has multiple medical problems which are significant and separately identifiable that require additional work above and beyond the Medicare Wellness Visit.      Chief Complaint  No chief complaint on file.    Subjective        HPI  Sue A Friess is also being seen today for medical annual wellness and following up on hypothyroid/CKD and complaining of tingling of lower extremities        Patient has lower extremity swelling/dependent edema, using Lasix which helps little with swelling.    Complaining of tingling and numbness to bilateral lower extremity which has been mildly worsening.    Reports no other acute complaints.    Objective   Vital Signs:  /95 (BP Location:  "Left arm, Patient Position: Sitting, Cuff Size: Adult)   Pulse 88   Temp 98.2 °F (36.8 °C) (Temporal)   Ht 172.7 cm (67.99\")   Wt 108 kg (238 lb 1.6 oz)   SpO2 98%   BMI 36.21 kg/m²     Physical Exam  Vitals reviewed.   Constitutional:       Appearance: Normal appearance. She is well-developed.   HENT:      Head: Normocephalic and atraumatic.      Right Ear: External ear normal.      Left Ear: External ear normal.      Mouth/Throat:      Pharynx: No oropharyngeal exudate.   Eyes:      Conjunctiva/sclera: Conjunctivae normal.      Pupils: Pupils are equal, round, and reactive to light.   Cardiovascular:      Rate and Rhythm: Normal rate and regular rhythm.      Heart sounds: No murmur heard.    No friction rub. No gallop.   Pulmonary:      Effort: Pulmonary effort is normal.      Breath sounds: Normal breath sounds. No wheezing or rhonchi.   Abdominal:      General: Bowel sounds are normal. There is no distension.      Palpations: Abdomen is soft.      Tenderness: There is no abdominal tenderness.   Musculoskeletal:      Comments: +1 lower extremity edema   Skin:     General: Skin is warm and dry.   Neurological:      Mental Status: She is alert and oriented to person, place, and time.      Cranial Nerves: No cranial nerve deficit.   Psychiatric:         Mood and Affect: Mood and affect normal.         Behavior: Behavior normal.         Thought Content: Thought content normal.         Judgment: Judgment normal.                 Assessment and Plan   Diagnoses and all orders for this visit:    1. Medicare annual wellness visit, subsequent (Primary)  -     TSH Rfx On Abnormal To Free T4; Future  -     Vitamin B12; Future  -     Folate; Future    2. Acquired hypothyroidism  Comments:  abnormal TSH. Increase levoth to 112mcg, rpt bw in 2 month  Orders:  -     TSH Rfx On Abnormal To Free T4; Future  -     Vitamin B12; Future  -     Folate; Future    3. Stage 3b chronic kidney disease (HCC)  Comments:  Chronic, " stable  Orders:  -     TSH Rfx On Abnormal To Free T4; Future  -     Vitamin B12; Future  -     Folate; Future    4. Numbness and tingling of lower extremity  Comments:  Trial of gabapentinTristan reviewed/contract reviewed.  Patient could not give urine, low suspicion for abuse  Orders:  -     TSH Rfx On Abnormal To Free T4; Future  -     Vitamin B12; Future  -     Folate; Future  -     Cancel: POC Urine Drug Screen [facility]  -     gabapentin (NEURONTIN) 100 MG capsule; Take 1 capsule by mouth 2 (Two) Times a Day.  Dispense: 90 capsule; Refill: 1    5. Medication management  -     Cancel: POC Urine Drug Screen [facility]  -     gabapentin (NEURONTIN) 100 MG capsule; Take 1 capsule by mouth 2 (Two) Times a Day.  Dispense: 90 capsule; Refill: 1    6. Need for pneumococcal vaccination  -     Pneumococcal Conjugate Vaccine 20-Valent (PCV20)    Recent blood work reviewed which shows mildly abnormal TSH, we will increase levothyroxine to 1 one 2 mcg.         Follow Up   Return in about 6 months (around 8/22/2023).  Patient was given instructions and counseling regarding her condition or for health maintenance advice. Please see specific information pulled into the AVS if appropriate.

## 2023-02-22 NOTE — PROGRESS NOTES
"    [facility] - PODIATRY    Today's Date: 02/22/23    Patient Name: Sue Simmons  MRN: [id number]  CSN: [id number]  PCP: Ej Santoyo MD, Last PCP Visit: 22 February 2023  Referring Provider: No ref. provider found    SUBJECTIVE     Chief Complaint   Patient presents with   • Left Foot - Follow-up, Nail Problem     Pt states foot is starting to \"crack\", pt also states her feet \"tingle\"   • Right Foot - Follow-up, Nail Problem     Pt states foot is starting to \"crack\", pt also states her feet \"tingle\"     HPI: Sue Simmons, a 77 y.o.female, comes to clinic with a family member.    New, Established, New Problem:  New problem  Location:  Toenails  Duration:   Greater than five years  Onset:  Gradual  Nature:  sore with palpation.  Stable, worsening, improving:   worsening  Aggravating factors:  Pain with shoe gear and ambulation.  Previous Treatment: Unable to trim their own toenails.    New, Established, New Problem:  SECOND PROBLEM  Location: Bilateral feet  Duration:   Greater than 1 month  Onset: Insidious  Nature: Dry skin  Stable, worsening, improving:  Worsening  Aggravating factors:     Previous Treatment:  Slowly worsening despite using OTC skin lotion.    Patient denies any fevers, chills, nausea, vomiting, shortness of breath, nor any other constitutional signs nor symptoms.       Past Medical History:   Diagnosis Date   • Anemia    • Arthritis    • Back pain with radiation 01/24/2013   • Breast lump    • Cancer (HCC)    • Cancer of right breast (HCC) 02/2012    had lumpectomy, undergoing treatment at Dr. Campoverde's every Tuesday for 6 months, then radiation   • Difficulty walking    • Foot pain, right    • Hemorrhoids    • Hypothyroidism 01/24/2013   • IC (interstitial cystitis)    • Ingrowing toenail    • Microscopic hematuria    • Mood disorder (HCC)    • Plantar fasciitis    • Rectal bleeding    • Vitamin D deficiency      Past Surgical History:   Procedure Laterality Date   • " APPENDECTOMY     • BREAST LUMPECTOMY Bilateral    • BREAST SURGERY     • COLONOSCOPY     • CYST REMOVAL      removed form coccyx   • CYSTOSCOPY     • HARDWARE REMOVAL     • PORTACATH PLACEMENT     • TOENAIL EXCISION     • TONSILLECTOMY       Family History   Problem Relation Age of Onset   • Lung cancer Mother    • Brain cancer Mother    • Diabetes Mother         due to Graves Disease   • Throat cancer Mother    • Arthritis Mother    • Cancer Mother         Lung Cancer   • Thyroid disease Mother         Graves Disease   • Heart disease Father    • Breast cancer Father         40s   • Arthritis Father    • Cancer Father         Breast Cancer   • Other Other         heart surgery in    • Alzheimer's disease Other    • Graves' disease Other    • Diabetes Maternal Aunt         unspecified type   • Diabetes Maternal Uncle         unspecified type   • Kidney nephrosis Maternal Uncle      Social History     Socioeconomic History   • Marital status:    Tobacco Use   • Smoking status: Former     Years: 40.00     Types: Cigarettes     Quit date: 2022     Years since quittin.6   • Smokeless tobacco: Never   • Tobacco comments:     quit at age 59, second hand smoke exposure- never   Vaping Use   • Vaping Use: Never used   Substance and Sexual Activity   • Alcohol use: Not Currently     Comment: light   • Drug use: Never   • Sexual activity: Not Currently     Allergies   Allergen Reactions   • Sulfa Antibiotics Itching     Current Outpatient Medications   Medication Sig Dispense Refill   • Diclofenac Sodium (VOLTAREN) 1 % gel gel Apply 4 g topically to the appropriate area as directed 2 (Two) Times a Day. 100 g 0   • furosemide (LASIX) 40 MG tablet TAKE 1 TABLET BY MOUTH EVERY DAY AS NEEDED FOR SWELLING 90 tablet 1   • levothyroxine (Synthroid) 112 MCG tablet Take 1 tablet by mouth Daily. 90 tablet 1   • QUEtiapine (SEROquel) 25 MG tablet TAKE 1 TABLET BY MOUTH EVERY NIGHT 30 tablet 1   • ammonium lactate  (AmLactin) 12 % lotion Apply  topically to the appropriate area as directed 2 (Two) Times a Day. 225 g 11     No current facility-administered medications for this visit.     Review of Systems   Constitutional: Negative.    Skin:        Painful toenails.   All other systems reviewed and are negative.      OBJECTIVE     Vitals:    23 1005   BP: 153/72   Pulse: 87   Temp: 97.3 °F (36.3 °C)   SpO2: 99%       Patient seen in no apparent distress.      PHYSICAL EXAM:     Foot/Ankle Exam:       General:   Appearance: obesity and elderly    Appearance comment:  Chronically ill  Orientation: AAOx3    Affect: appropriate    Gait: unimpaired    Shoe Gear:  Casual shoes    VASCULAR      Right Foot Vascularity   Normal vascular exam    Dorsalis pedis:  1+  Posterior tibial:  1+  Skin Temperature: cool    Edema Gradin+ and pitting  CFT:  < 3 seconds  Pedal Hair Growth:  Absent  Varicosities: moderate varicosities       Left Foot Vascularity   Normal vascular exam    Dorsalis pedis:  1+  Posterior tibial:  1+  Skin Temperature: cool    Edema Gradin+ and pitting  CFT:  < 3 seconds  Pedal Hair Growth:  Absent  Varicosities: moderate varicosities        NEUROLOGIC     Right Foot Neurologic   Normal sensation    Light touch sensation:  Normal  Vibratory sensation:  Normal  Hot/Cold sensation: normal    Protective Sensation using Austin-Nick Monofilament:  10     Left Foot Neurologic   Normal sensation    Light touch sensation:  Normal  Vibratory sensation:  Normal  Hot/cold sensation: normal    Protective Sensation using Austin-Nick Monofilament:  10     MUSCLE STRENGTH     Right Foot Muscle Strength   Foot dorsiflexion:  4-  Foot plantar flexion:  4-  Foot inversion:  4-  Foot eversion:  4-     Left Foot Muscle Strength   Foot dorsiflexion:  4-  Foot plantar flexion:  4-  Foot inversion:  4-  Foot eversion:  4-     RANGE OF MOTION      Right Foot Range of Motion   Foot and ankle ROM within normal limits        Left Foot Range of Motion   Foot and ankle ROM within normal limits       DERMATOLOGIC     Right Foot Dermatologic   Skin: dry skin    Nails: onychomycosis, abnormally thick, subungual debris, dystrophic nails and ingrown toenail    Nails comment:  Toenails 1, 2, and 5     Left Foot Dermatologic   Skin: dry skin    Nails: onychomycosis, abnormally thick, subungual debris, dystrophic nails and ingrown toenail    Nails comment:  Toenails 1, 2, and 5      ASSESSMENT/PLAN     Diagnoses and all orders for this visit:    1. Foot pain, bilateral (Primary)    2. Onychomycosis    3. Onychocryptosis    4. Anhidrosis  -     ammonium lactate (AmLactin) 12 % lotion; Apply  topically to the appropriate area as directed 2 (Two) Times a Day.  Dispense: 225 g; Refill: 11        Comprehensive lower extremity examination and evaluation was performed.    Discussed findings and treatment plan including risks, benefits, and treatment options with patient in detail. Patient agreed with treatment plan.    Toenails 1, 2, 5 on Right and 1, 2, 5 on Left were debrided with nail nippers then filed with a Dremel nail vicki.  Patient tolerated procedure well without complications.    An After Visit Summary was printed and given to the patient at discharge, including (if requested) any available informative/educational handouts regarding diagnosis, treatment, or medications. All questions were answered to patient/family satisfaction. Should symptoms fail to improve or worsen they agree to call or return to clinic or to go to the Emergency Department. Discussed the importance of following up with any needed screening tests/labs/specialist appointments and any requested follow-up recommended by me today. Importance of maintaining follow-up discussed and patient accepts that missed appointments can delay diagnosis and potentially lead to worsening of conditions.    Return in about 9 weeks (around 4/26/2023) for Toenail Care., or sooner if acute  issues arise.    This document has been electronically signed by Henry Sifuentes DPM on February 22, 2023 10:39 EST

## 2023-03-19 ENCOUNTER — HOSPITAL ENCOUNTER (EMERGENCY)
Facility: HOSPITAL | Age: 78
Discharge: HOME | End: 2023-03-19
Attending: STUDENT IN AN ORGANIZED HEALTH CARE EDUCATION/TRAINING PROGRAM | Admitting: STUDENT IN AN ORGANIZED HEALTH CARE EDUCATION/TRAINING PROGRAM
Payer: MEDICARE

## 2023-03-19 ENCOUNTER — APPOINTMENT (OUTPATIENT)
Dept: GENERAL RADIOLOGY | Facility: HOSPITAL | Age: 78
End: 2023-03-19
Payer: MEDICARE

## 2023-03-19 VITALS
RESPIRATION RATE: 16 BRPM | WEIGHT: 241.62 LBS | OXYGEN SATURATION: 100 % | BODY MASS INDEX: 40.26 KG/M2 | TEMPERATURE: 97.7 F | DIASTOLIC BLOOD PRESSURE: 53 MMHG | SYSTOLIC BLOOD PRESSURE: 155 MMHG | HEART RATE: 73 BPM | HEIGHT: 65 IN

## 2023-03-19 DIAGNOSIS — R60.9 EDEMA, UNSPECIFIED TYPE: Primary | ICD-10-CM

## 2023-03-19 LAB
ALBUMIN SERPL-MCNC: 4.1 G/DL (ref 3.5–5.2)
ALBUMIN/GLOB SERPL: 1.4 G/DL
ALP SERPL-CCNC: 57 U/L (ref 39–117)
ALT SERPL W P-5'-P-CCNC: 17 U/L (ref 1–33)
ANION GAP SERPL CALCULATED.3IONS-SCNC: 14.3 MMOL/L (ref 5–15)
AST SERPL-CCNC: 28 U/L (ref 1–32)
BASOPHILS # BLD AUTO: 0.02 10*3/MM3 (ref 0–0.2)
BASOPHILS NFR BLD AUTO: 0.3 % (ref 0–1.5)
BILIRUB SERPL-MCNC: 0.5 MG/DL (ref 0–1.2)
BUN SERPL-MCNC: 19 MG/DL (ref 8–23)
BUN/CREAT SERPL: 16.2 (ref 7–25)
CALCIUM SPEC-SCNC: 9.4 MG/DL (ref 8.6–10.5)
CHLORIDE SERPL-SCNC: 101 MMOL/L (ref 98–107)
CO2 SERPL-SCNC: 22.7 MMOL/L (ref 22–29)
CREAT SERPL-MCNC: 1.17 MG/DL (ref 0.57–1)
DEPRECATED RDW RBC AUTO: 49.2 FL (ref 37–54)
EGFRCR SERPLBLD CKD-EPI 2021: 47.9 ML/MIN/1.73
EOSINOPHIL # BLD AUTO: 0.09 10*3/MM3 (ref 0–0.4)
EOSINOPHIL NFR BLD AUTO: 1.4 % (ref 0.3–6.2)
ERYTHROCYTE [DISTWIDTH] IN BLOOD BY AUTOMATED COUNT: 14.3 % (ref 12.3–15.4)
GLOBULIN UR ELPH-MCNC: 3 GM/DL
GLUCOSE SERPL-MCNC: 119 MG/DL (ref 65–99)
HCT VFR BLD AUTO: 39.2 % (ref 34–46.6)
HGB BLD-MCNC: 12.7 G/DL (ref 12–15.9)
IMM GRANULOCYTES # BLD AUTO: 0.02 10*3/MM3 (ref 0–0.05)
IMM GRANULOCYTES NFR BLD AUTO: 0.3 % (ref 0–0.5)
LYMPHOCYTES # BLD AUTO: 1.87 10*3/MM3 (ref 0.7–3.1)
LYMPHOCYTES NFR BLD AUTO: 28.5 % (ref 19.6–45.3)
MAGNESIUM SERPL-MCNC: 2 MG/DL (ref 1.6–2.4)
MCH RBC QN AUTO: 30.4 PG (ref 26.6–33)
MCHC RBC AUTO-ENTMCNC: 32.4 G/DL (ref 31.5–35.7)
MCV RBC AUTO: 93.8 FL (ref 79–97)
MONOCYTES # BLD AUTO: 0.69 10*3/MM3 (ref 0.1–0.9)
MONOCYTES NFR BLD AUTO: 10.5 % (ref 5–12)
NEUTROPHILS NFR BLD AUTO: 3.87 10*3/MM3 (ref 1.7–7)
NEUTROPHILS NFR BLD AUTO: 59 % (ref 42.7–76)
NRBC BLD AUTO-RTO: 0 /100 WBC (ref 0–0.2)
NT-PROBNP SERPL-MCNC: 304.5 PG/ML (ref 0–1800)
PLATELET # BLD AUTO: 198 10*3/MM3 (ref 140–450)
PMV BLD AUTO: 10.7 FL (ref 6–12)
POTASSIUM SERPL-SCNC: 4.4 MMOL/L (ref 3.5–5.2)
PROT SERPL-MCNC: 7.1 G/DL (ref 6–8.5)
RBC # BLD AUTO: 4.18 10*6/MM3 (ref 3.77–5.28)
SODIUM SERPL-SCNC: 138 MMOL/L (ref 136–145)
WBC NRBC COR # BLD: 6.56 10*3/MM3 (ref 3.4–10.8)

## 2023-03-19 PROCEDURE — 71045 X-RAY EXAM CHEST 1 VIEW: CPT

## 2023-03-19 PROCEDURE — 83735 ASSAY OF MAGNESIUM: CPT | Performed by: STUDENT IN AN ORGANIZED HEALTH CARE EDUCATION/TRAINING PROGRAM

## 2023-03-19 PROCEDURE — 93005 ELECTROCARDIOGRAM TRACING: CPT | Performed by: STUDENT IN AN ORGANIZED HEALTH CARE EDUCATION/TRAINING PROGRAM

## 2023-03-19 PROCEDURE — 96374 THER/PROPH/DIAG INJ IV PUSH: CPT

## 2023-03-19 PROCEDURE — 25010000002 FUROSEMIDE PER 20 MG: Performed by: STUDENT IN AN ORGANIZED HEALTH CARE EDUCATION/TRAINING PROGRAM

## 2023-03-19 PROCEDURE — 99282 EMERGENCY DEPT VISIT SF MDM: CPT

## 2023-03-19 PROCEDURE — 85025 COMPLETE CBC W/AUTO DIFF WBC: CPT | Performed by: STUDENT IN AN ORGANIZED HEALTH CARE EDUCATION/TRAINING PROGRAM

## 2023-03-19 PROCEDURE — 80053 COMPREHEN METABOLIC PANEL: CPT | Performed by: STUDENT IN AN ORGANIZED HEALTH CARE EDUCATION/TRAINING PROGRAM

## 2023-03-19 PROCEDURE — 93010 ELECTROCARDIOGRAM REPORT: CPT | Performed by: INTERNAL MEDICINE

## 2023-03-19 PROCEDURE — 36415 COLL VENOUS BLD VENIPUNCTURE: CPT | Performed by: STUDENT IN AN ORGANIZED HEALTH CARE EDUCATION/TRAINING PROGRAM

## 2023-03-19 PROCEDURE — 83880 ASSAY OF NATRIURETIC PEPTIDE: CPT | Performed by: STUDENT IN AN ORGANIZED HEALTH CARE EDUCATION/TRAINING PROGRAM

## 2023-03-19 RX ORDER — FUROSEMIDE 10 MG/ML
40 INJECTION INTRAMUSCULAR; INTRAVENOUS ONCE
Status: COMPLETED | OUTPATIENT
Start: 2023-03-19 | End: 2023-03-19

## 2023-03-19 RX ADMIN — FUROSEMIDE 40 MG: 10 INJECTION, SOLUTION INTRAMUSCULAR; INTRAVENOUS at 16:56

## 2023-03-19 NOTE — ED PROVIDER NOTES
"Time: 4:15 PM EDT  Date of encounter:  3/19/2023  Independent Historian/Clinical History and Information was obtained by:   Patient  Chief Complaint: Leg swelling    History is limited by: N/A    History of Present Illness:  Patient is a 77 y.o. year old adult who presents to the emergency department for evaluation of leg swelling.  Patient has had leg swelling per her daughter for \"years\".  She states she has been told it is due to her \"varicose veins\".  More recently she has had increased leg swelling bilaterally.  She has no trouble breathing no chest pain.  She states she has never had an echocardiogram.  She does take Lasix 40 mg once a day. Leg swelling is painless.  No fever.     HPI    Patient Care Team  Primary Care Provider: Ej Santoyo MD    Past Medical History:     Allergies   Allergen Reactions   • Sulfa Antibiotics Itching     Past Medical History:   Diagnosis Date   • Anemia    • Arthritis    • Back pain with radiation 01/24/2013   • Breast lump    • Cancer (HCC)    • Cancer of right breast (HCC) 02/2012    had lumpectomy, undergoing treatment at Dr. Campoverde's every Tuesday for 6 months, then radiation   • Difficulty walking    • Foot pain, right    • Hemorrhoids    • Hypothyroidism 01/24/2013   • IC (interstitial cystitis)    • Ingrowing toenail    • Microscopic hematuria    • Mood disorder (HCC)    • Plantar fasciitis    • Rectal bleeding    • Vitamin D deficiency      Past Surgical History:   Procedure Laterality Date   • APPENDECTOMY     • BREAST LUMPECTOMY Bilateral    • BREAST SURGERY     • COLONOSCOPY     • CYST REMOVAL  1957    removed form coccyx   • CYSTOSCOPY     • HARDWARE REMOVAL     • PORTACATH PLACEMENT     • TOENAIL EXCISION     • TONSILLECTOMY       Family History   Problem Relation Age of Onset   • Lung cancer Mother    • Brain cancer Mother    • Diabetes Mother         due to Graves Disease   • Throat cancer Mother    • Arthritis Mother    • Cancer Mother         Lung Cancer   • " Thyroid disease Mother         Graves Disease   • Heart disease Father    • Breast cancer Father         40s   • Arthritis Father    • Cancer Father         Breast Cancer   • Other Other         heart surgery in    • Alzheimer's disease Other    • Graves' disease Other    • Diabetes Maternal Aunt         unspecified type   • Diabetes Maternal Uncle         unspecified type   • Kidney nephrosis Maternal Uncle        Home Medications:  Prior to Admission medications    Medication Sig Start Date End Date Taking? Authorizing Provider   ammonium lactate (AmLactin) 12 % lotion Apply  topically to the appropriate area as directed 2 (Two) Times a Day. 23   Henry Sifuentes DPM   Diclofenac Sodium (VOLTAREN) 1 % gel gel Apply 4 g topically to the appropriate area as directed 2 (Two) Times a Day. 2/15/22   Ej Santoyo MD   furosemide (LASIX) 40 MG tablet TAKE 1 TABLET BY MOUTH EVERY DAY AS NEEDED FOR SWELLING 22   Ej Santoyo MD   gabapentin (NEURONTIN) 100 MG capsule Take 1 capsule by mouth 2 (Two) Times a Day. 23   Ej Santoyo MD   levothyroxine (Synthroid) 112 MCG tablet Take 1 tablet by mouth Daily. 23   Ej Santoyo MD   QUEtiapine (SEROquel) 25 MG tablet TAKE 1 TABLET BY MOUTH EVERY NIGHT 23   Ej Santoyo MD        Social History:   Social History     Tobacco Use   • Smoking status: Former     Years: 40.00     Types: Cigarettes     Quit date: 2022     Years since quittin.7   • Smokeless tobacco: Never   • Tobacco comments:     quit at age 59, second hand smoke exposure- never   Vaping Use   • Vaping Use: Never used   Substance Use Topics   • Alcohol use: Not Currently     Comment: light   • Drug use: Never         Review of Systems:  Review of Systems   Constitutional: Negative for chills and fever.   HENT: Negative for congestion, rhinorrhea and sore throat.    Eyes: Negative for pain and visual disturbance.   Respiratory: Negative for apnea, cough, chest tightness  "and shortness of breath.    Cardiovascular: Positive for leg swelling. Negative for chest pain and palpitations.   Gastrointestinal: Negative for abdominal pain, diarrhea, nausea and vomiting.   Genitourinary: Negative for difficulty urinating and dysuria.   Musculoskeletal: Negative for joint swelling and myalgias.   Skin: Negative for color change.   Neurological: Negative for seizures and headaches.   Psychiatric/Behavioral: Negative.    All other systems reviewed and are negative.       Physical Exam:  /53   Pulse 73   Temp 97.7 °F (36.5 °C) (Oral)   Resp 16   Ht 165.1 cm (65\")   Wt 110 kg (241 lb 10 oz)   SpO2 100%   BMI 40.21 kg/m²     Physical Exam  Vitals and nursing note reviewed.   Constitutional:       General: She is not in acute distress.     Appearance: Normal appearance. She is not toxic-appearing.   HENT:      Head: Normocephalic and atraumatic.      Jaw: There is normal jaw occlusion.   Eyes:      General: Lids are normal.      Extraocular Movements: Extraocular movements intact.      Conjunctiva/sclera: Conjunctivae normal.      Pupils: Pupils are equal, round, and reactive to light.   Cardiovascular:      Rate and Rhythm: Normal rate and regular rhythm.      Pulses: Normal pulses.      Heart sounds: Normal heart sounds.   Pulmonary:      Effort: Pulmonary effort is normal. No respiratory distress.      Breath sounds: Normal breath sounds. No wheezing or rhonchi.   Abdominal:      General: Abdomen is flat.      Palpations: Abdomen is soft.      Tenderness: There is no abdominal tenderness. There is no guarding or rebound.   Musculoskeletal:         General: Normal range of motion.      Cervical back: Normal range of motion and neck supple.      Right lower leg: Edema present.      Left lower leg: Edema present.      Comments: 3+ pitting edema to knees bilaterally  Mild redness bilaterally   Skin:     General: Skin is warm and dry.   Neurological:      Mental Status: She is alert and " oriented to person, place, and time. Mental status is at baseline.   Psychiatric:         Mood and Affect: Mood normal.                  Procedures:  Procedures      Medical Decision Making:        External Notes reviewed:    Prior office visit with podiatrist      The following orders were placed and all results were independently analyzed by me:  Orders Placed This Encounter   Procedures   • XR Chest 1 View   • Comprehensive Metabolic Panel   • BNP   • Magnesium   • CBC Auto Differential   • Cardiology (on-call MD unless specified)   • ECG 12 Lead Other; leg swelling   • CBC & Differential       Medications Given in the Emergency Department:  Medications   furosemide (LASIX) injection 40 mg (40 mg Intravenous Given 3/19/23 1656)        ED Course:         Labs:    Lab Results (last 24 hours)     Procedure Component Value Units Date/Time    Comprehensive Metabolic Panel [[id number]]  (Abnormal) Collected: 03/19/23 1635    Specimen: Blood from Arm, Left Updated: 03/19/23 1744     Glucose 119 mg/dL      BUN 19 mg/dL      Creatinine 1.17 mg/dL      Sodium 138 mmol/L      Potassium 4.4 mmol/L      Comment: Slight hemolysis detected by analyzer. Results may be affected.        Chloride 101 mmol/L      CO2 22.7 mmol/L      Calcium 9.4 mg/dL      Total Protein 7.1 g/dL      Albumin 4.1 g/dL      ALT (SGPT) 17 U/L      AST (SGOT) 28 U/L      Comment: Slight hemolysis detected by analyzer. Results may be affected.        Alkaline Phosphatase 57 U/L      Total Bilirubin 0.5 mg/dL      Globulin 3.0 gm/dL      A/G Ratio 1.4 g/dL      BUN/Creatinine Ratio 16.2     Anion Gap 14.3 mmol/L      eGFR 47.9 mL/min/1.73     Narrative:      GFR Normal >60  Chronic Kidney Disease <60  Kidney Failure <15    The GFR formula is only valid for adults with stable renal function between ages 18 and 70.    BNP [[id number]]  (Normal) Collected: 03/19/23 1635    Specimen: Blood from Arm, Left Updated: 03/19/23 1719     proBNP 304.5 pg/mL      Narrative:      Among patients with dyspnea, NT-proBNP is highly sensitive for the detection of acute congestive heart failure. In addition NT-proBNP of <300 pg/ml effectively rules out acute congestive heart failure with 99% negative predictive value.      Magnesium [[id number]]  (Normal) Collected: 03/19/23 1635    Specimen: Blood from Arm, Left Updated: 03/19/23 1723     Magnesium 2.0 mg/dL     CBC & Differential [[id number]]  (Normal) Collected: 03/19/23 1805    Specimen: Blood from Arm, Left Updated: 03/19/23 1813    Narrative:      The following orders were created for panel order CBC & Differential.  Procedure                               Abnormality         Status                     ---------                               -----------         ------                     CBC Auto Differential[[id number]]        Normal              Final result                 Please view results for these tests on the individual orders.    CBC Auto Differential [[id number]]  (Normal) Collected: 03/19/23 1805    Specimen: Blood from Arm, Left Updated: 03/19/23 1813     WBC 6.56 10*3/mm3      RBC 4.18 10*6/mm3      Hemoglobin 12.7 g/dL      Hematocrit 39.2 %      MCV 93.8 fL      MCH 30.4 pg      MCHC 32.4 g/dL      RDW 14.3 %      RDW-SD 49.2 fl      MPV 10.7 fL      Platelets 198 10*3/mm3      Neutrophil % 59.0 %      Lymphocyte % 28.5 %      Monocyte % 10.5 %      Eosinophil % 1.4 %      Basophil % 0.3 %      Immature Grans % 0.3 %      Neutrophils, Absolute 3.87 10*3/mm3      Lymphocytes, Absolute 1.87 10*3/mm3      Monocytes, Absolute 0.69 10*3/mm3      Eosinophils, Absolute 0.09 10*3/mm3      Basophils, Absolute 0.02 10*3/mm3      Immature Grans, Absolute 0.02 10*3/mm3      nRBC 0.0 /100 WBC            Imaging:    XR Chest 1 View    Result Date: 3/19/2023  PROCEDURE: XR CHEST 1 VW  COMPARISON: [facility], CR, XR CHEST 1 VW, 11/26/2022, 12:11.  INDICATIONS: swelling legs  FINDINGS:   The lungs are well-expanded.  The heart and pulmonary vasculature are within normal limits. No pleural effusions are identified. There are no active appearing infiltrates.  There are surgical clips in the right breast and right axilla.  IMPRESSION: No active disease.  RIMMA HADLEY MD       Electronically Signed and Approved By: RIMMA HADLEY MD on 3/19/2023 at 16:32                 Differential Diagnosis and Discussion:    Ddx: CHF, renal failure, cirrhosis, DVT, venous stasis      MDM     Patient's symptoms are likely secondary to venous stasis.  No indication of CHF.  I also conducted a bedside ultrasound which showed normal ejection fraction.    Patient Care Considerations:    I considered venous Doppler but patient has no erythema, warmth, she has recurrent leg swelling with no history of DVT low suspicion for DVT.      Consultants/Shared Management Plan:    I spoke with Dr. Levine who agreed with increasing Lasix to 40 mg twice daily for 1 week and follow-up with primary care doctor.    Social Determinants of Health:    Patient is independent, reliable, and has access to care.       Disposition and Care Coordination:    Discharged: I considered escalation of care by admitting this patient for observation, however the patient has improved and is suitable and  stable for discharge.    I have explained discharge medications and the need for follow up with the patient/caretakers. This was also printed in the discharge instructions. Patient was discharged with the following medications and follow up:      Medication List      No changes were made to your prescriptions during this visit.      Blake Levine MD  [address] [place] [id number]  [phone]             Final diagnoses:   Edema, unspecified type        ED Disposition     ED Disposition   Discharge    Condition   Stable    Comment   --             This medical record created using voice recognition software.           Isabell Rocha MD  03/19/23 1941

## 2023-03-19 NOTE — DISCHARGE INSTRUCTIONS
Return to the emergency department if you have chest pain or difficulty breathing.  Take 40 mg of Lasix twice a day and follow-up with your primary care doctor.

## 2023-03-20 ENCOUNTER — TELEPHONE (OUTPATIENT)
Dept: FAMILY MEDICINE CLINIC | Facility: CLINIC | Age: 78
End: 2023-03-20
Payer: MEDICARE

## 2023-03-20 DIAGNOSIS — I51.9 CARDIAC DISEASE: Primary | ICD-10-CM

## 2023-03-20 NOTE — TELEPHONE ENCOUNTER
Patient daughter was calling to get patient in to see Dr Mario. Patient was seen in the ED over the weekend for swelling purple feet. Daughter mentions they wanted her seen for the swelling, referral to cardiology, and for her high blood pressure. After speaking to clinical they are going to get in touch with dr mario since he is out of office on Monday and see when he would like the patient to be schedule. Let the daughter know his MA will reach out to her regarding a follow up appointment and that she went ahead and sent the request for the referral back as well. Patient daughter voiced understanding. Was unable to get blood pressure reading at this time.

## 2023-03-20 NOTE — TELEPHONE ENCOUNTER
Referral for cardiology is pended. Patient is already scheduled for April 14, just needs a referral for insurance purposes.     Patient is being given 40mg of Lasix, has enough to last until her appointment that is scheduled for Next Friday (31st)     Advised the daughter to take the patient blood pressure and keep a log to show and talk with you about at the upcoming appointment.

## 2023-03-29 LAB — QT INTERVAL: 427 MS

## 2023-03-31 ENCOUNTER — OFFICE VISIT (OUTPATIENT)
Dept: FAMILY MEDICINE CLINIC | Facility: CLINIC | Age: 78
End: 2023-03-31
Payer: MEDICARE

## 2023-03-31 VITALS
HEART RATE: 84 BPM | OXYGEN SATURATION: 98 % | SYSTOLIC BLOOD PRESSURE: 130 MMHG | HEIGHT: 65 IN | WEIGHT: 233.6 LBS | BODY MASS INDEX: 38.92 KG/M2 | DIASTOLIC BLOOD PRESSURE: 88 MMHG | TEMPERATURE: 97.5 F | RESPIRATION RATE: 16 BRPM

## 2023-03-31 DIAGNOSIS — M25.551 CHRONIC PAIN OF BOTH HIPS: ICD-10-CM

## 2023-03-31 DIAGNOSIS — M79.89 SWELLING OF LOWER EXTREMITY: ICD-10-CM

## 2023-03-31 DIAGNOSIS — I10 PRIMARY HYPERTENSION: ICD-10-CM

## 2023-03-31 DIAGNOSIS — G89.29 CHRONIC PAIN OF BOTH HIPS: ICD-10-CM

## 2023-03-31 DIAGNOSIS — R60.1 ANASARCA: ICD-10-CM

## 2023-03-31 DIAGNOSIS — M25.552 CHRONIC PAIN OF BOTH HIPS: ICD-10-CM

## 2023-03-31 DIAGNOSIS — N18.31 STAGE 3A CHRONIC KIDNEY DISEASE: ICD-10-CM

## 2023-03-31 DIAGNOSIS — I10 PRIMARY HYPERTENSION: Primary | ICD-10-CM

## 2023-03-31 DIAGNOSIS — I89.0 LYMPHEDEMA: ICD-10-CM

## 2023-03-31 DIAGNOSIS — M79.89 LEG SWELLING: ICD-10-CM

## 2023-03-31 RX ORDER — LISINOPRIL 10 MG/1
10 TABLET ORAL DAILY
Qty: 90 TABLET | OUTPATIENT
Start: 2023-03-31

## 2023-03-31 RX ORDER — FUROSEMIDE 40 MG/1
40 TABLET ORAL 2 TIMES DAILY PRN
Qty: 180 TABLET | Refills: 1 | Status: SHIPPED | OUTPATIENT
Start: 2023-03-31

## 2023-03-31 RX ORDER — LISINOPRIL 10 MG/1
10 TABLET ORAL DAILY
Qty: 30 TABLET | Refills: 1 | Status: SHIPPED | OUTPATIENT
Start: 2023-03-31

## 2023-03-31 NOTE — PROGRESS NOTES
"Chief Complaint  Following up on blood pressure/swelling    Subjective         Sue Simmons is a 77 y.o. adult who presents to [facility]    77 years old comes to the clinic today to follow-up.    Patient is complaining of some what worsening lower extremity swelling, taking Lasix 40 mg twice daily as recommended by cardiologist.    CKD stable    Hypertension; home baseline running 150 systolic    Complaining of mild worsening of chronic bilateral hip pain and would like to see physical therapy    Denies any chest pain/shortness of breath.  Physically somewhat active.    Objective   Vital Signs:   Vitals:    03/31/23 1019   BP: 130/88   BP Location: Left arm   Patient Position: Sitting   Cuff Size: Large Adult   Pulse: 84   Resp: 16   Temp: 97.5 °F (36.4 °C)   TempSrc: Temporal   SpO2: 98%   Weight: 106 kg (233 lb 9.6 oz)   Height: 165.1 cm (65\")      Body mass index is 38.87 kg/m².   Wt Readings from Last 3 Encounters:   03/31/23 106 kg (233 lb 9.6 oz)   03/19/23 110 kg (241 lb 10 oz)   02/22/23 108 kg (238 lb 1.6 oz)      BP Readings from Last 3 Encounters:   03/31/23 130/88   03/19/23 155/53   02/22/23 152/95        Patient Care Team:  Ej Santoyo MD as PCP - General (Family Medicine)  Kathleen Smith RN as Ambulatory  ([facility])     Physical Exam  Vitals reviewed.   Constitutional:       Appearance: Normal appearance. Sue Simmons is well-developed.   HENT:      Head: Normocephalic and atraumatic.      Right Ear: External ear normal.      Left Ear: External ear normal.      Mouth/Throat:      Pharynx: No oropharyngeal exudate.   Eyes:      Conjunctiva/sclera: Conjunctivae normal.      Pupils: Pupils are equal, round, and reactive to light.   Cardiovascular:      Rate and Rhythm: Normal rate and regular rhythm.      Heart sounds: No murmur heard.    No friction rub. No gallop.   Pulmonary:      Effort: Pulmonary effort is normal.      Breath " sounds: Normal breath sounds. No wheezing or rhonchi.   Abdominal:      General: Bowel sounds are normal. There is no distension.      Palpations: Abdomen is soft.      Tenderness: There is no abdominal tenderness.   Musculoskeletal:      Comments: Almost 2+ lower extremity swelling, mild generalized swelling noted to upper extremity as well   Skin:     General: Skin is warm and dry.   Neurological:      Mental Status: Sue Simmons is alert and oriented to person, place, and time.      Cranial Nerves: No cranial nerve deficit.   Psychiatric:         Mood and Affect: Mood and affect normal.         Behavior: Behavior normal.         Thought Content: Thought content normal.         Judgment: Judgment normal.                       Assessment and Plan   Diagnoses and all orders for this visit:    1. Primary hypertension (Primary)  -     lisinopril (PRINIVIL,ZESTRIL) 10 MG tablet; Take 1 tablet by mouth Daily.  Dispense: 30 tablet; Refill: 1    2. Anasarca  -     US Abdomen Limited; Future  -     furosemide (LASIX) 40 MG tablet; Take 1 tablet by mouth 2 (Two) Times a Day As Needed (swelling).  Dispense: 180 tablet; Refill: 1    3. Leg swelling  -     US Abdomen Limited; Future  -     Ambulatory Referral to Lymphedema Clinic  -     furosemide (LASIX) 40 MG tablet; Take 1 tablet by mouth 2 (Two) Times a Day As Needed (swelling).  Dispense: 180 tablet; Refill: 1    4. Stage 3a chronic kidney disease (HCC)    5. Lymphedema  -     Ambulatory Referral to Lymphedema Clinic    6. Chronic pain of both hips  -     Ambulatory Referral to Physical Therapy Evaluate and treat    7. Swelling of lower extremity  -     furosemide (LASIX) 40 MG tablet; Take 1 tablet by mouth 2 (Two) Times a Day As Needed (swelling).  Dispense: 180 tablet; Refill: 1      Conservative management for swelling discussed including low-salt diet/elevation, compression.    Recent ER visit and recent blood work reviewed, 2 weeks home blood pressure logs  requested    Tobacco Use: Medium Risk   • Smoking Tobacco Use: Former   • Smokeless Tobacco Use: Never   • Passive Exposure: Not on file            Follow Up   Return if symptoms worsen or fail to improve.  Patient was given instructions and counseling regarding Sue Simmons's condition or for health maintenance advice. Please see specific information pulled into the AVS if appropriate.

## 2023-04-03 ENCOUNTER — TELEPHONE (OUTPATIENT)
Dept: CASE MANAGEMENT | Facility: OTHER | Age: 78
End: 2023-04-03
Payer: MEDICARE

## 2023-04-03 NOTE — TELEPHONE ENCOUNTER
Patient identified as possible CCM/HRCM candidate from ER data. UTR, and wasn't able to leave a message.   Kathleen Smith RN  Ambulatory Case Management  4/3/2023, 14:23 EDT

## 2023-04-04 ENCOUNTER — TELEPHONE (OUTPATIENT)
Dept: CASE MANAGEMENT | Facility: OTHER | Age: 78
End: 2023-04-04
Payer: MEDICARE

## 2023-04-04 ENCOUNTER — HOSPITAL ENCOUNTER (OUTPATIENT)
Dept: OCCUPATIONAL THERAPY | Facility: HOSPITAL | Age: 78
Setting detail: THERAPIES SERIES
Discharge: HOME | End: 2023-04-04
Payer: MEDICARE

## 2023-04-04 DIAGNOSIS — N18.32 STAGE 3B CHRONIC KIDNEY DISEASE: ICD-10-CM

## 2023-04-04 DIAGNOSIS — I89.0 LYMPHEDEMA: Primary | ICD-10-CM

## 2023-04-04 PROCEDURE — 97165 OT EVAL LOW COMPLEX 30 MIN: CPT

## 2023-04-04 NOTE — THERAPY EVALUATION
Outpatient Occupational Therapy Lymphedema Initial Evaluation   [facility]     Patient Name: Sue Simmons  : 1945  MRN: [id number]  Today's Date: 2023      Visit Date: 2023    Patient Active Problem List   Diagnosis   • Acquired hypothyroidism   • Chronic left hip pain   • Stage 3b chronic kidney disease   • History of breast cancer   • Anemia   • Arthritis   • Back pain with radiation   • Breast lump   • Cancer   • Cancer of right breast   • Family history of coronary artery disease   • Family history of lung cancer   • Family history of thyroid disease   • Foot pain, right   • Hematuria, microscopic   • Hemorrhoids   • IC (interstitial cystitis)   • Ingrown toenail   • Mood disorder   • Rectal bleeding   • Varicose veins of lower extremities with inflammation   • Venous insufficiency (chronic) (peripheral)   • Vitamin D deficiency        Past Medical History:   Diagnosis Date   • Anemia    • Arthritis    • Back pain with radiation 2013   • Breast lump    • Cancer    • Cancer of right breast 2012    had lumpectomy, undergoing treatment at Dr. Campoverde's every Tuesday for 6 months, then radiation   • Difficulty walking    • Foot pain, right    • Hemorrhoids    • Hypothyroidism 2013   • IC (interstitial cystitis)    • Ingrowing toenail    • Microscopic hematuria    • Mood disorder    • Plantar fasciitis    • Rectal bleeding    • Vitamin D deficiency         Past Surgical History:   Procedure Laterality Date   • APPENDECTOMY     • BREAST LUMPECTOMY Bilateral    • BREAST SURGERY     • COLONOSCOPY     • CYST REMOVAL      removed form coccyx   • CYSTOSCOPY     • HARDWARE REMOVAL     • PORTACATH PLACEMENT     • TOENAIL EXCISION     • TONSILLECTOMY           Visit Dx:     ICD-10-CM ICD-9-CM   1. Lymphedema  I89.0 457.1   2. Stage 3b chronic kidney disease  N18.32 585.3        Patient History     Row Name 23 0800 23 0942          History    Chief Complaint Difficulty  Walking;Muscle weakness;Swelling  -SF Difficulty Walking;Muscle weakness;Swelling (P)    -patient     Type of Pain Hip pain;Lower Extremity / Leg  -SF Hip pain;Lower Extremity / Leg (P)    -patient     Date Current Problem(s) Began 01/01/15  -SF 01/01/15 (P)    -patient     Brief Description of Current Complaint Patient and daughter provided patient hx. Patient and daughter report increased swelling in BLEs from feet to knees. Daughter reports pavann was hospitalized due to swelling. Patient also reports hx of breast cancer.  -SF Swelling in legs and feet; now in arms some (P)    -patient     Patient/Caregiver Goals Relieve pain;Decrease swelling  -SF Relieve pain;Decrease swelling (P)    -patient     Hand Dominance right-handed  -SF right-handed (P)    -patient     Occupation/sports/leisure activities -- Retired; chair yoga and other types of activities offered by [employer] (P)    -patient     Patient seeing anyone else for problem(s)? -- Dr Santoyo and new appt with Dr. Levine (P)    -patient     What clinical tests have you had for this problem? -- X-ray;CT scan;Bone Density (P)    -patient     Are you or can you be pregnant -- No (P)    -patient        Fall Risk Assessment    Any falls in the past year: Yes  -SF Yes (P)    -patient     Factors that contributed to the fall: Lost balance  -SF Lost balance (P)    -patient        Services    Prior Rehab/Home Health Experiences No  -SF No (P)    -patient     Are you currently receiving Home Health services No  -SF No (P)    -patient     Do you plan to receive Home Health services in the near future No  -SF No (P)    -patient        Daily Activities    Primary Language English  -SF English (P)    -patient     Are you able to read Yes  -SF Yes (P)    -patient     Are you able to write Yes  -SF Yes (P)    -patient     How does patient learn best? Listening;Reading;Demonstration;Pictures/Video  -SF Listening;Reading;Demonstration;Pictures/Video (P)    -patient         "Safety    Are you being hurt, hit, or frightened by anyone at home or in your life? No  -SF No (P)    -patient     Are you being neglected by a caregiver No  -SF No (P)    -patient     Have you had any of the following issues with N/A  -SF N/A (P)    -patient           User Key  (r) = Recorded By, (t) = Taken By, (c) = Cosigned By    Initials Name Provider Type    SF Alanis Collins, OT Occupational Therapist    patient Sue Simmons --                 Lymphedema     Row Name 04/04/23 0900             Subjective Pain    Able to rate subjective pain? yes  -SF      Pre-Treatment Pain Level 0  -SF      Post-Treatment Pain Level 0  -SF      Subjective Pain Comment Patient denies pain  -SF         Subjective Comments    Subjective Comments Patient reports she has worn compression stockings in the past but they were uncomfortable and hard to get on so she stopped wearing them. Patient reports swelling does improve w/ elevation. Patient does report her feet feeling \"tingly\". Patient reports heaviness and tightness in BLEs. Patient reports no issues w/ getting in and out of the tub. Patient and daughter report sometimes it is harder for patient to don socks IND. Daughter reports patient lives in assisted living and will have to indepedently don compression. Patient reports she is not interested in wearing compression at this time but was willing to be educated on appropriate options to help w/ swelling.  -SF         Lymphedema Assessment    Cancer Comments Patient reports hx of breast cancer.  -SF         Posture/Observations    Posture- WNL Posture is WNL  -SF         General ROM    GENERAL ROM COMMENTS BLE WFL  -SF         MMT (Manual Muscle Testing)    General MMT Comments BLE WFL  -SF         Lymphedema Edema Assessment    Pitting Edema Moderate  -SF      Stemmer Sign bilateral:;positive  -SF      Bracken Hump bilateral:;negative  -SF         Skin Changes/Observations    Location/Assessment Lower Extremity  -SF      Lower " Extremity Conditions bilateral:;intact;dry;clean  -SF      Lower Extremity Color/Pigment bilateral:;hyperpigmented;brawny  -SF         Lymphedema Sensation    Lymphedema Sensation Reports RLE:;LLE:;tingling  -SF         Lymphedema Measurements    Measurement Type(s) Circumferential  -SF      Circumferential Areas Lower extremities  -SF            User Key  (r) = Recorded By, (t) = Taken By, (c) = Cosigned By    Initials Name Provider Type    Alanis Kelsey OT Occupational Therapist               Circumferential Measurements:           Baseline: R: 2527.97 ml L: 2700.36 ml      Therapy Education  Education Details: Patient and caregiver were educated on etiology of lymphedema. Patient was educated on importance of daily compression to help manage swelling as well as skin care recommendations. Patient and caregiver were educated and provided w/ handouts on CopperFit 15-20mmHg compression socks to help manage swelling. Caregiver was educated that patient may swell through compression socks and may require higher compression but 15-20mmHg would be a good start to get her used to compression. Patient and caregiver were encouraged to contact lymphedema clinic with questions once they purchase compression socks.  Given: Symptoms/condition management, Edema management  Program: New  How Provided: Verbal, Written, Demonstration  Provided to: Patient, Caregiver  Level of Understanding: Verbalized         OT Goals     Row Name 04/04/23 1021          Time Calculation    OT Goal Re-Cert Due Date 05/04/23  -           User Key  (r) = Recorded By, (t) = Taken By, (c) = Cosigned By    Initials Name Provider Type    Alanis Kelsey OT Occupational Therapist              GOALS:    1. Patient with decreased ADL/Self-Care routine for lymphedema management.   LTG 1:  90 days:  Patient/caregiver will independently verbalize/demonstrate ADL/Self-Care routine for lymphedema management.           STATUS:  New   STG1a:  30 days:  Patient  will independently demonstrate/verbalize proper skin care routine to prevent infection and or wound development.            STATUS:  New  STG1b:  30 days:  Patient/caregiver will obtain properly fitting compression orthosis, will demonstrate don/doff skill of compression orthosis with modified independence, and independently verbalize wear schedule.          STATUS: New     TREATMENT:  Therapeutic Activity, Patient/Caregiver Education, ADL retraining, Manual Therapy, Orthotic Management and training, Modalities: TENS, NMES, Ultrasound, Fluidotherapy Wound dressing if necessary,  Therapeutic Exercise, Modalities        OT Assessment/Plan     Row Name 04/04/23 1018          OT Assessment    Functional Limitations Performance in self-care ADL;Limitations in functional capacity and performance;Decreased safety during functional activities;Impaired locomotion  -SF     Impairments Coordination;Balance;Edema;Impaired lymphatic circulation;Impaired venous circulation  -SF     Assessment Comments Patient is a 77 year old female presenting for evaluation of BLE lymphedema. Patient reports heaviness and skin tightness associated w/ lymphedema. At this time, patient is hesitant to wear compression socks/stockings due to compression in the past causing discomfort. Patient and caregiver were educated on the benefits of daily compression especially w/ patient being hospitalized due to BLE edema. Caregiver was provided w/ handout on compression socks to obtain and verbalized understanding. Patient will benefit from follow-up appointment once she has obtained compression socks to ensure fit and understanding of don/doffing.  -SF     OT Diagnosis BLE Lymphedema  -SF     OT Rehab Potential Good  -SF     Patient/caregiver participated in establishment of treatment plan and goals Yes  -SF     Patient would benefit from skilled therapy intervention Yes  -SF        OT Plan    OT Frequency One time visit  -SF     Predicted Duration of  Therapy Intervention (OT) 4 weeks  -SF     Planned CPT's? OT EVAL LOW COMPLEXITY: 98953;OT EVAL MOD COMPLEXITY: 33672;OT EVAL HIGH COMPLEXITY: 53655;OT RE-EVAL: 03957;OT THER PROC EA 15 MIN: 84435ID;OT THER ACT EA 15 MIN: 43207MD;OT MANUAL THERAPY EA 15 MIN: 37962;OT ORTHO/PROSTHET CHECKOUT EA 15 MIN: 16570;OT ORTHOTIC MGMT/TRAIN EA 15 MIN: 85817  -SF     Planned Therapy Interventions (Optional Details) manual therapy techniques;prosthetic fitting/training;ROM (Range of Motion);patient/family education;orthotic fitting/training  -SF     OT Plan Comments Initaite POC  -SF           User Key  (r) = Recorded By, (t) = Taken By, (c) = Cosigned By    Initials Name Provider Type    Alanis Kelsey OT Occupational Therapist                    OT eval complexity:    Examination:    Performance Deficits include:   Health Management,  Functional Mobility   # deficits affecting performance:   2   Decision Making:       Treatment Options Considered : Health Promotion, Prevention, Remediation/Restoration   # Treatment options considered : Several (3)    Modification Made for: Direction, Environment, Task, Complexity, Task   Level of Task Modification: Minimal to Moderate    Comorbidity Affect Performance: Yes   How is performance affected Stage 3 kidney disease,    Evaluation Level Determined:   Low               Time Calculation:   Untimed Charges  OT Eval/Re-eval Minutes: 65  Total Minutes  Untimed Charges Total Minutes: 65   Total Minutes: 65     Therapy Charges for Today     Code Description Service Date Service Provider Modifiers Qty    37817857760  OT EVAL LOW COMPLEXITY 4 4/4/2023 Alanis Collins OT GO 1                    Alanis Collins OT  4/4/2023

## 2023-04-05 DIAGNOSIS — M79.89 LEG SWELLING: ICD-10-CM

## 2023-04-05 DIAGNOSIS — R60.1 ANASARCA: Primary | ICD-10-CM

## 2023-04-05 DIAGNOSIS — R41.89 COGNITIVE AND BEHAVIORAL CHANGES: ICD-10-CM

## 2023-04-05 DIAGNOSIS — R60.1 ANASARCA: ICD-10-CM

## 2023-04-05 DIAGNOSIS — R46.89 COGNITIVE AND BEHAVIORAL CHANGES: ICD-10-CM

## 2023-04-05 RX ORDER — SPIRONOLACTONE 25 MG/1
TABLET ORAL
Qty: 90 TABLET | OUTPATIENT
Start: 2023-04-05

## 2023-04-05 RX ORDER — QUETIAPINE FUMARATE 25 MG/1
25 TABLET, FILM COATED ORAL NIGHTLY
Qty: 30 TABLET | Refills: 1 | Status: SHIPPED | OUTPATIENT
Start: 2023-04-05

## 2023-04-05 RX ORDER — SPIRONOLACTONE 25 MG/1
25 TABLET ORAL DAILY PRN
Qty: 30 TABLET | Refills: 1 | Status: SHIPPED | OUTPATIENT
Start: 2023-04-05

## 2023-04-07 DIAGNOSIS — Z44.30: ICD-10-CM

## 2023-04-07 DIAGNOSIS — R60.1 ANASARCA: Primary | ICD-10-CM

## 2023-04-10 NOTE — TELEPHONE ENCOUNTER
Spoke with patients daughter Tianna about program. She said her mother is in the Memory care unit at [facility], states she has things under control at this time, and no need for program.  Kathleen Smith RN  Ambulatory Case Management    4/10/2023, 12:23 EDT

## 2023-04-14 ENCOUNTER — OFFICE VISIT (OUTPATIENT)
Dept: CARDIOLOGY | Facility: CLINIC | Age: 78
End: 2023-04-14
Payer: MEDICARE

## 2023-04-14 VITALS
SYSTOLIC BLOOD PRESSURE: 128 MMHG | WEIGHT: 235.4 LBS | DIASTOLIC BLOOD PRESSURE: 81 MMHG | HEIGHT: 65 IN | BODY MASS INDEX: 39.22 KG/M2 | HEART RATE: 75 BPM

## 2023-04-14 DIAGNOSIS — I10 PRIMARY HYPERTENSION: ICD-10-CM

## 2023-04-14 DIAGNOSIS — R60.0 EDEMA LEG: Primary | ICD-10-CM

## 2023-04-14 PROCEDURE — 99204 OFFICE O/P NEW MOD 45 MIN: CPT | Performed by: INTERNAL MEDICINE

## 2023-04-14 NOTE — PROGRESS NOTES
Chief Complaint  Establish Care and Leg Swelling    Subjective        Sue Simmons presents to [facility]  History of present illness:    Patient is a 77-year-old female with past medical history significant for hypertension, mild chronic kidney disease and varicose veins.  She was sent here by primary care physician due to bilateral lower extremity edema.  They do note that it has been on and off especially in the left leg worse than the right.  She does watch her salt closely.  She apparently was initially on spironolactone and Lasix 40 a day.  When she went to the emergency department due to the leg swelling back on 3/19/2023 we increased the Lasix to 40 mg twice daily.  She does note the swelling is much better.  Her serum creatinine was in the 1.17 range at that time.  This is very similar to how it is been since December 2021.  She does have an appointment with a kidney doctor also set up.  She does note that she has seen a lymphedema specialist in the past and they put her on stockings that she could not tolerate.  She has a remote smoking history.  She notes no alcohol.  Father has a history of bypass surgery and congestive heart failure.      Past Medical History:   Diagnosis Date   • Anemia    • Arthritis    • Back pain with radiation 01/24/2013   • Breast lump    • Cancer    • Cancer of right breast 02/2012    had lumpectomy, undergoing treatment at Dr. Rubi every Tuesday for 6 months, then radiation   • Difficulty walking    • Foot pain, right    • Hemorrhoids    • Hypothyroidism 01/24/2013   • IC (interstitial cystitis)    • Ingrowing toenail    • Microscopic hematuria    • Mood disorder    • Plantar fasciitis    • Rectal bleeding    • Vitamin D deficiency          Past Surgical History:   Procedure Laterality Date   • APPENDECTOMY     • BREAST LUMPECTOMY Bilateral    • BREAST SURGERY     • COLONOSCOPY     • CYST REMOVAL  1957    removed form coccyx   • CYSTOSCOPY      • HARDWARE REMOVAL     • PORTACATH PLACEMENT     • TOENAIL EXCISION     • TONSILLECTOMY            Social History     Socioeconomic History   • Marital status:    Tobacco Use   • Smoking status: Former     Years: 40.00     Types: Cigarettes     Quit date: 2022     Years since quittin.7   • Smokeless tobacco: Never   • Tobacco comments:     quit at age 59, second hand smoke exposure- never   Vaping Use   • Vaping Use: Never used   Substance and Sexual Activity   • Alcohol use: Not Currently     Comment: light   • Drug use: Never   • Sexual activity: Not Currently         Family History   Problem Relation Age of Onset   • Lung cancer Mother    • Brain cancer Mother    • Diabetes Mother         due to Graves Disease   • Throat cancer Mother    • Arthritis Mother    • Cancer Mother         Lung Cancer   • Thyroid disease Mother         Graves Disease   • Heart disease Father    • Breast cancer Father         40s   • Arthritis Father    • Cancer Father         Breast Cancer   • Other Other         heart surgery in    • Alzheimer's disease Other    • Graves' disease Other    • Diabetes Maternal Aunt         unspecified type   • Diabetes Maternal Uncle         unspecified type   • Kidney nephrosis Maternal Uncle           Allergies   Allergen Reactions   • Sulfa Antibiotics Itching            Current Outpatient Medications:   •  ammonium lactate (AmLactin) 12 % lotion, Apply  topically to the appropriate area as directed 2 (Two) Times a Day., Disp: 225 g, Rfl: 11  •  furosemide (LASIX) 40 MG tablet, Take 1 tablet by mouth 2 (Two) Times a Day As Needed (swelling)., Disp: 180 tablet, Rfl: 1  •  gabapentin (NEURONTIN) 100 MG capsule, Take 1 capsule by mouth 2 (Two) Times a Day., Disp: 90 capsule, Rfl: 1  •  levothyroxine (Synthroid) 112 MCG tablet, Take 1 tablet by mouth Daily., Disp: 90 tablet, Rfl: 1  •  lisinopril (PRINIVIL,ZESTRIL) 10 MG tablet, Take 1 tablet by mouth Daily., Disp: 30 tablet, Rfl:  "1  •  QUEtiapine (SEROquel) 25 MG tablet, TAKE 1 TABLET BY MOUTH EVERY NIGHT, Disp: 30 tablet, Rfl: 1  •  spironolactone (Aldactone) 25 MG tablet, Take 1 tablet by mouth Daily As Needed (swelling)., Disp: 30 tablet, Rfl: 1      ROS:  Cardiac review of systems is positive for edema left greater than right.    Objective     /81   Pulse 75   Ht 165.1 cm (65\")   Wt 107 kg (235 lb 6.4 oz)   BMI 39.17 kg/m²       General Appearance:   · well developed  · well nourished  HENT:   · oropharynx moist  · lips not cyanotic  Respiratory:  · no respiratory distress  · normal breath sounds  · no rales  Cardiovascular:  · no jugular venous distention  · regular rhythm  · S1 normal, S2 normal  · no S3, no S4   · no murmur  · no rub, no thrill  · No carotid bruit  · pedal pulses normal  · lower extremity edema: none    Musculoskeletal:  · no clubbing of fingers.   · normocephalic, head atraumatic  Skin:   · warm, dry  Psychiatric:  · judgement and insight appropriate  · normal mood and affect    ECHO:    STRESS:    CATH:  No results found for this or any previous visit.    BMP:   Glucose   Date Value Ref Range Status   03/19/2023 119 (H) 65 - 99 mg/dL Final     BUN   Date Value Ref Range Status   03/19/2023 19 8 - 23 mg/dL Final     Creatinine   Date Value Ref Range Status   03/19/2023 1.17 (H) 0.57 - 1.00 mg/dL Final     Sodium   Date Value Ref Range Status   03/19/2023 138 136 - 145 mmol/L Final     Potassium   Date Value Ref Range Status   03/19/2023 4.4 3.5 - 5.2 mmol/L Final     Comment:     Slight hemolysis detected by analyzer. Results may be affected.     Chloride   Date Value Ref Range Status   03/19/2023 101 98 - 107 mmol/L Final     CO2   Date Value Ref Range Status   03/19/2023 22.7 22.0 - 29.0 mmol/L Final     Calcium   Date Value Ref Range Status   03/19/2023 9.4 8.6 - 10.5 mg/dL Final     BUN/Creatinine Ratio   Date Value Ref Range Status   03/19/2023 16.2 7.0 - 25.0 Final     Anion Gap   Date Value Ref Range " Status   03/19/2023 14.3 5.0 - 15.0 mmol/L Final     eGFR   Date Value Ref Range Status   03/19/2023 47.9 (L) >60.0 mL/min/1.73 Final     LIPIDS:  Total Cholesterol   Date Value Ref Range Status   02/17/2023 190 0 - 200 mg/dL Final     Triglycerides   Date Value Ref Range Status   02/17/2023 277 (H) 0 - 150 mg/dL Final     HDL Cholesterol   Date Value Ref Range Status   02/17/2023 27 (L) 40 - 60 mg/dL Final     LDL Cholesterol    Date Value Ref Range Status   02/17/2023 114 (H) 0 - 100 mg/dL Final     VLDL Cholesterol   Date Value Ref Range Status   02/17/2023 49 (H) 5 - 40 mg/dL Final     LDL/HDL Ratio   Date Value Ref Range Status   02/17/2023 3.99  Final         Procedures             ASSESSMENT:  Diagnoses and all orders for this visit:    1. Edema leg (Primary)  -     Adult Transthoracic Echo Complete W/ Cont if Necessary Per Protocol; Future    2. Primary hypertension  -     Adult Transthoracic Echo Complete W/ Cont if Necessary Per Protocol; Future         PLAN:    1.  Patient's edema is overall doing better on the Lasix 40 mg p.o. twice daily.  I am going to call the patient and ask her to recheck her kidney function 1 more time.  She has been in the 1.18-1.25 range since at least December 2021.  She does have an appointment with a kidney doctor in the future.  They are going to try a different stockings for her legs.  She is watching her salt.  2.  Blood pressures under good control.  Continue the lisinopril and spironolactone.  3.  For patient's edema we will get an echocardiogram on the patient.  4.  If the echocardiogram looks good then I do think the edema is probably most consistent with just venous insufficiency.  5.  When patient was in the hospital her beta natruretic peptide was normal along with chest x-ray.      Return in about 2 months (around 6/14/2023).     Patient was given instructions and counseling regarding Sue JIGNA Friess's condition or for health maintenance advice. Please see specific  information pulled into the AVS if appropriate.         Blake Levine MD   4/14/2023  14:21 EDT

## 2023-04-18 ENCOUNTER — TELEPHONE (OUTPATIENT)
Dept: CARDIOLOGY | Facility: CLINIC | Age: 78
End: 2023-04-18
Payer: MEDICARE

## 2023-04-18 NOTE — TELEPHONE ENCOUNTER
Attempted to call patient. Unable to leave VM.   BMP is ordered and expected to be drawn on 4/21/23

## 2023-04-18 NOTE — TELEPHONE ENCOUNTER
----- Message from APURVA Street sent at 4/16/2023 11:07 AM EDT -----    ----- Message -----  From: Blake Levine MD  Sent: 4/14/2023   2:26 PM EDT  To: APURVA [address]    Can we call the patient and asked them to check another BMP since getting out of the hospital.  I just want to make sure the kidney function remained stable.

## 2023-04-18 NOTE — TELEPHONE ENCOUNTER
GIULIANA patient's daughter Tianna- patient is to have labs drawn on Friday- Dr Santoyo ordered. Will f/u on Friday for results.     Patient's daughter verbalized understanding and appreciation.

## 2023-04-19 ENCOUNTER — APPOINTMENT (OUTPATIENT)
Dept: CT IMAGING | Facility: HOSPITAL | Age: 78
End: 2023-04-19
Payer: MEDICARE

## 2023-04-19 ENCOUNTER — HOSPITAL ENCOUNTER (EMERGENCY)
Facility: HOSPITAL | Age: 78
Discharge: HOME | End: 2023-04-19
Attending: EMERGENCY MEDICINE | Admitting: EMERGENCY MEDICINE
Payer: MEDICARE

## 2023-04-19 ENCOUNTER — TELEPHONE (OUTPATIENT)
Dept: FAMILY MEDICINE CLINIC | Facility: CLINIC | Age: 78
End: 2023-04-19

## 2023-04-19 VITALS
WEIGHT: 230 LBS | DIASTOLIC BLOOD PRESSURE: 84 MMHG | RESPIRATION RATE: 16 BRPM | HEIGHT: 68 IN | SYSTOLIC BLOOD PRESSURE: 132 MMHG | BODY MASS INDEX: 34.86 KG/M2 | OXYGEN SATURATION: 98 % | HEART RATE: 71 BPM | TEMPERATURE: 98.3 F

## 2023-04-19 DIAGNOSIS — R10.12 LEFT UPPER QUADRANT ABDOMINAL PAIN: Primary | ICD-10-CM

## 2023-04-19 LAB
ALBUMIN SERPL-MCNC: 4.1 G/DL (ref 3.5–5.2)
ALBUMIN/GLOB SERPL: 1.4 G/DL
ALP SERPL-CCNC: 62 U/L (ref 39–117)
ALT SERPL W P-5'-P-CCNC: 21 U/L (ref 1–33)
ANION GAP SERPL CALCULATED.3IONS-SCNC: 15.9 MMOL/L (ref 5–15)
AST SERPL-CCNC: 20 U/L (ref 1–32)
BASOPHILS # BLD AUTO: 0.02 10*3/MM3 (ref 0–0.2)
BASOPHILS NFR BLD AUTO: 0.3 % (ref 0–1.5)
BILIRUB SERPL-MCNC: 0.6 MG/DL (ref 0–1.2)
BUN SERPL-MCNC: 29 MG/DL (ref 8–23)
BUN/CREAT SERPL: 21 (ref 7–25)
CALCIUM SPEC-SCNC: 9.4 MG/DL (ref 8.6–10.5)
CHLORIDE SERPL-SCNC: 95 MMOL/L (ref 98–107)
CO2 SERPL-SCNC: 24.1 MMOL/L (ref 22–29)
CREAT SERPL-MCNC: 1.38 MG/DL (ref 0.57–1)
D-LACTATE SERPL-SCNC: 1.9 MMOL/L (ref 0.5–2)
D-LACTATE SERPL-SCNC: 3 MMOL/L (ref 0.5–2)
DEPRECATED RDW RBC AUTO: 49.5 FL (ref 37–54)
EGFRCR SERPLBLD CKD-EPI 2021: 39.3 ML/MIN/1.73
EOSINOPHIL # BLD AUTO: 0.06 10*3/MM3 (ref 0–0.4)
EOSINOPHIL NFR BLD AUTO: 0.8 % (ref 0.3–6.2)
ERYTHROCYTE [DISTWIDTH] IN BLOOD BY AUTOMATED COUNT: 14.6 % (ref 12.3–15.4)
GLOBULIN UR ELPH-MCNC: 3 GM/DL
GLUCOSE SERPL-MCNC: 166 MG/DL (ref 65–99)
HCT VFR BLD AUTO: 37.9 % (ref 34–46.6)
HGB BLD-MCNC: 12.7 G/DL (ref 12–15.9)
HOLD SPECIMEN: NORMAL
HOLD SPECIMEN: NORMAL
IMM GRANULOCYTES # BLD AUTO: 0.01 10*3/MM3 (ref 0–0.05)
IMM GRANULOCYTES NFR BLD AUTO: 0.1 % (ref 0–0.5)
LIPASE SERPL-CCNC: 35 U/L (ref 13–60)
LYMPHOCYTES # BLD AUTO: 1.88 10*3/MM3 (ref 0.7–3.1)
LYMPHOCYTES NFR BLD AUTO: 25.9 % (ref 19.6–45.3)
MCH RBC QN AUTO: 31 PG (ref 26.6–33)
MCHC RBC AUTO-ENTMCNC: 33.5 G/DL (ref 31.5–35.7)
MCV RBC AUTO: 92.4 FL (ref 79–97)
MONOCYTES # BLD AUTO: 0.71 10*3/MM3 (ref 0.1–0.9)
MONOCYTES NFR BLD AUTO: 9.8 % (ref 5–12)
NEUTROPHILS NFR BLD AUTO: 4.57 10*3/MM3 (ref 1.7–7)
NEUTROPHILS NFR BLD AUTO: 63.1 % (ref 42.7–76)
NRBC BLD AUTO-RTO: 0 /100 WBC (ref 0–0.2)
PLATELET # BLD AUTO: 212 10*3/MM3 (ref 140–450)
PMV BLD AUTO: 10.9 FL (ref 6–12)
POTASSIUM SERPL-SCNC: 4 MMOL/L (ref 3.5–5.2)
PROT SERPL-MCNC: 7.1 G/DL (ref 6–8.5)
RBC # BLD AUTO: 4.1 10*6/MM3 (ref 3.77–5.28)
SODIUM SERPL-SCNC: 135 MMOL/L (ref 136–145)
WBC NRBC COR # BLD: 7.25 10*3/MM3 (ref 3.4–10.8)
WHOLE BLOOD HOLD COAG: NORMAL
WHOLE BLOOD HOLD SPECIMEN: NORMAL

## 2023-04-19 PROCEDURE — 83605 ASSAY OF LACTIC ACID: CPT | Performed by: EMERGENCY MEDICINE

## 2023-04-19 PROCEDURE — 83605 ASSAY OF LACTIC ACID: CPT

## 2023-04-19 PROCEDURE — 83690 ASSAY OF LIPASE: CPT

## 2023-04-19 PROCEDURE — 80053 COMPREHEN METABOLIC PANEL: CPT

## 2023-04-19 PROCEDURE — 99282 EMERGENCY DEPT VISIT SF MDM: CPT

## 2023-04-19 PROCEDURE — 85025 COMPLETE CBC W/AUTO DIFF WBC: CPT

## 2023-04-19 PROCEDURE — 36415 COLL VENOUS BLD VENIPUNCTURE: CPT

## 2023-04-19 PROCEDURE — 74176 CT ABD & PELVIS W/O CONTRAST: CPT

## 2023-04-19 RX ORDER — PANTOPRAZOLE SODIUM 40 MG/1
40 TABLET, DELAYED RELEASE ORAL DAILY
Qty: 30 TABLET | Refills: 0 | Status: SHIPPED | OUTPATIENT
Start: 2023-04-19 | End: 2023-04-21

## 2023-04-19 RX ORDER — SODIUM CHLORIDE 0.9 % (FLUSH) 0.9 %
10 SYRINGE (ML) INJECTION AS NEEDED
Status: DISCONTINUED | OUTPATIENT
Start: 2023-04-19 | End: 2023-04-19 | Stop reason: HOSPADM

## 2023-04-19 RX ADMIN — SODIUM CHLORIDE, POTASSIUM CHLORIDE, SODIUM LACTATE AND CALCIUM CHLORIDE 500 ML: 600; 310; 30; 20 INJECTION, SOLUTION INTRAVENOUS at 17:16

## 2023-04-19 NOTE — TELEPHONE ENCOUNTER
Caller: ANEUDY ROUSE    Relationship: Emergency Contact    Best call back number: [phone]    What is the best time to reach you: ANY     Who are you requesting to speak with (clinical staff, provider,  specific staff member):     What was the call regarding: PATIENTS DAUGHTER STATED SHE HAS BEEN HAVING LEFT STOMACH PAIN AFTER SHE EATS. PATIENTS DAUGHTER WOULD LIKE TO SPEAK WITH PCP REGARDING THIS MATTER. PLEASE ADVISE     Do you require a callback: YES

## 2023-04-19 NOTE — ED NOTES
"Pt taken to restroom to get urine sample. Urine hat placed in toilet. Pt states she \"missed the potty hat\". Pt encouraged to use call light and a bedside commode will be brought to obtain urine sample.   "

## 2023-04-19 NOTE — ED PROVIDER NOTES
Time: 3:24 PM EDT  Date of encounter:  4/19/2023  Independent Historian/Clinical History and Information was obtained by:   Patient and Family  Chief Complaint   Patient presents with   • Nausea   • Abdominal Pain     Abd pain is under L breast upper abd x 1 month- has MD appt on Friday at 2:15- they were unable to move up her appt, so they brought her here.        History is limited by: N/A    History of Present Illness:  Patient is a 77 y.o. year old adult who presents to the emergency department for evaluation of abdominal pain.  Patient states she has had intermittent abdominal pain for approximately 2 months.  Patient states when the pain occurs it is usually located in her left upper quadrant and at times feels like it radiates under her left breast.  Patient denies any pain at this time.  Patient denies any associated nausea, vomiting, fevers, or diarrhea.  Patient does state every time she eats she feels like she is having belching and reflux.  Patient is not on any medications for GERD. The patient has not seen a gastroenterologist. There are no aggravating or alleviating factors. The pains usually last approximately 1 hour. Denies dysuria. Denies urgency. Denies frequency.  Patient states she was scheduled to see her PCP on Friday but wanted to be seen there so they said to come to the emergency department today.     Patient Care Team  Primary Care Provider: Ej Santoyo MD    Past Medical History:     Allergies   Allergen Reactions   • Sulfa Antibiotics Itching     Past Medical History:   Diagnosis Date   • Anemia    • Arthritis    • Back pain with radiation 01/24/2013   • Breast lump    • Cancer    • Cancer of right breast 02/2012    had lumpectomy, undergoing treatment at Dr. Rubi every Tuesday for 6 months, then radiation   • Difficulty walking    • Foot pain, right    • Hemorrhoids    • Hypothyroidism 01/24/2013   • IC (interstitial cystitis)    • Ingrowing toenail    • Microscopic hematuria    •  Mood disorder    • Plantar fasciitis    • Rectal bleeding    • Vitamin D deficiency      Past Surgical History:   Procedure Laterality Date   • APPENDECTOMY     • BREAST LUMPECTOMY Bilateral    • BREAST SURGERY     • COLONOSCOPY     • CYST REMOVAL  1957    removed form coccyx   • CYSTOSCOPY     • HARDWARE REMOVAL     • PORTACATH PLACEMENT     • TOENAIL EXCISION     • TONSILLECTOMY       Family History   Problem Relation Age of Onset   • Lung cancer Mother    • Brain cancer Mother    • Diabetes Mother         due to Graves Disease   • Throat cancer Mother    • Arthritis Mother    • Cancer Mother         Lung Cancer   • Thyroid disease Mother         Graves Disease   • Heart disease Father    • Breast cancer Father         40s   • Arthritis Father    • Cancer Father         Breast Cancer   • Other Other         heart surgery in 2005   • Alzheimer's disease Other    • Graves' disease Other    • Diabetes Maternal Aunt         unspecified type   • Diabetes Maternal Uncle         unspecified type   • Kidney nephrosis Maternal Uncle        Home Medications:  Prior to Admission medications    Medication Sig Start Date End Date Taking? Authorizing Provider   ammonium lactate (AmLactin) 12 % lotion Apply  topically to the appropriate area as directed 2 (Two) Times a Day. 2/22/23   Henry Sifuentes DPM   furosemide (LASIX) 40 MG tablet Take 1 tablet by mouth 2 (Two) Times a Day As Needed (swelling). 3/31/23   Ej Santoyo MD   gabapentin (NEURONTIN) 100 MG capsule Take 1 capsule by mouth 2 (Two) Times a Day. 2/22/23   Ej Santoyo MD   levothyroxine (Synthroid) 112 MCG tablet Take 1 tablet by mouth Daily. 2/20/23   Ej Santoyo MD   lisinopril (PRINIVIL,ZESTRIL) 10 MG tablet Take 1 tablet by mouth Daily. 3/31/23   Ej Santoyo MD   QUEtiapine (SEROquel) 25 MG tablet TAKE 1 TABLET BY MOUTH EVERY NIGHT 4/5/23   Ej Santoyo MD   spironolactone (Aldactone) 25 MG tablet Take 1 tablet by mouth Daily As Needed  "(swelling). 23   Ej Santoyo MD        Social History:   Social History     Tobacco Use   • Smoking status: Former     Years: 40.00     Types: Cigarettes     Quit date: 2022     Years since quittin.8   • Smokeless tobacco: Never   • Tobacco comments:     quit at age 59, second hand smoke exposure- never   Vaping Use   • Vaping Use: Never used   Substance Use Topics   • Alcohol use: Not Currently     Comment: light   • Drug use: Never         Review of Systems:  Review of Systems   Constitutional: Negative for chills and fever.   HENT: Negative for congestion, ear pain and sore throat.    Eyes: Negative for pain.   Respiratory: Negative for cough, chest tightness and shortness of breath.    Cardiovascular: Negative for chest pain.   Gastrointestinal: Positive for abdominal pain. Negative for diarrhea, nausea and vomiting.   Genitourinary: Negative for flank pain and hematuria.   Musculoskeletal: Negative for joint swelling.   Skin: Negative for pallor.   Neurological: Negative for seizures and headaches.   All other systems reviewed and are negative.       Physical Exam:  /84   Pulse 71   Temp 98.3 °F (36.8 °C) (Oral)   Resp 16   Ht 172.7 cm (68\")   Wt 104 kg (230 lb)   SpO2 98%   BMI 34.97 kg/m²     Physical Exam  Vitals and nursing note reviewed.   Constitutional:       General: Sue A Friess is not in acute distress.     Appearance: Normal appearance. Sue A Friess is not toxic-appearing.   HENT:      Head: Normocephalic and atraumatic.      Mouth/Throat:      Mouth: Mucous membranes are moist.   Eyes:      General: No scleral icterus.     Extraocular Movements: Extraocular movements intact.      Conjunctiva/sclera: Conjunctivae normal.   Cardiovascular:      Rate and Rhythm: Normal rate and regular rhythm.      Pulses: Normal pulses.      Heart sounds: Normal heart sounds.      Comments: Trace edema in the bilateral lower extremities  Pulmonary:      Effort: Pulmonary effort is " normal. No respiratory distress.      Breath sounds: Normal breath sounds.   Abdominal:      General: Abdomen is flat. There is no distension.      Palpations: Abdomen is soft.      Tenderness: There is abdominal tenderness (mild epigastric and left upper quadrant).   Musculoskeletal:         General: Normal range of motion.      Cervical back: Normal range of motion and neck supple.      Right lower leg: Edema present.      Left lower leg: Edema present.   Skin:     General: Skin is warm and dry.      Coloration: Skin is not cyanotic.   Neurological:      Mental Status: Sue Simmons is alert and oriented to person, place, and time. Mental status is at baseline.   Psychiatric:         Attention and Perception: Attention and perception normal.         Mood and Affect: Mood normal.                  Procedures:  Procedures      Medical Decision Making:      Comorbidities that affect care:    Cancer, hypothyroidism, interstitial cystitis, hypertension    External Notes reviewed:    Previous Clinic Note: I reviewed office visit note from 4/14/2023 and Telephone Encounter: Reviewed telephone encounter from PCPs office dated 3/19/2023,      The following orders were placed and all results were independently analyzed by me:  Orders Placed This Encounter   Procedures   • CT Abdomen Pelvis Without Contrast   • Jackson Draw   • Comprehensive Metabolic Panel   • Lipase   • Lactic Acid, Plasma   • CBC Auto Differential   • STAT Lactic Acid, Reflex   • Undress & Gown   • CBC & Differential   • Green Top (Gel)   • Lavender Top   • Gold Top - SST   • Light Blue Top       Medications Given in the Emergency Department:  Medications   lactated ringers bolus 500 mL (0 mL Intravenous Stopped 4/19/23 1939)        ED Course:    The patient was initially evaluated in the triage area where orders were placed. The patient was later dispositioned by Yadiel Roche DO.      The patient was advised to stay for completion of workup which includes  but is not limited to communication of labs and radiological results, reassessment and plan. The patient was advised that leaving prior to disposition by a provider could result in critical findings that are not communicated to the patient.     ED Course as of 04/20/23 1111   u Apr 20, 2023   1110 EKG performed at 1651 was interpreted by me and showed normal sinus rhythm with a ventricular rate of 70 bpm.  OH interval is 165 ms.  P waves are normal.  QRS interval is normal.  Axis is at -27 degrees.  There is no acute ischemic ST or T wave change identified.  QT corrected is 460 ms. [TB]      ED Course User Index  [TB] Yadiel Roche DO   The patient was seen and evaluated the ED by me.  The above history and physical examination was performed as documented.  Diagnostic data was obtained.  Results reviewed.  Discussed with the patient.  Patient's ED work-up was unremarkable.  Based on other is a portion of the patient's history that she sings to have significant reflux or GERD.  Patient is not on a PPI.  I will start her on Protonix.  Patient has an appointment in 2 days with her PCP.  I recommend she follow-up with her PCP and discuss outpatient endoscopy.  I explained to her that some of her abdominal complaints may be related to more of a gastric etiology and the EGD may be beneficial in making the confirmed diagnosis.  Both the patient and the daughter verbalized understanding and is in agreement with this treatment plan.    Labs:    Lab Results (last 24 hours)     Procedure Component Value Units Date/Time    CBC & Differential [[id number]]  (Normal) Collected: 04/19/23 1522    Specimen: Blood Updated: 04/19/23 1529    Narrative:      The following orders were created for panel order CBC & Differential.  Procedure                               Abnormality         Status                     ---------                               -----------         ------                     CBC Auto Differential[[id number]]         Normal              Final result                 Please view results for these tests on the individual orders.    Comprehensive Metabolic Panel [[id number]]  (Abnormal) Collected: 04/19/23 1522    Specimen: Blood Updated: 04/19/23 1613     Glucose 166 mg/dL      BUN 29 mg/dL      Creatinine 1.38 mg/dL      Sodium 135 mmol/L      Potassium 4.0 mmol/L      Chloride 95 mmol/L      CO2 24.1 mmol/L      Calcium 9.4 mg/dL      Total Protein 7.1 g/dL      Albumin 4.1 g/dL      ALT (SGPT) 21 U/L      AST (SGOT) 20 U/L      Alkaline Phosphatase 62 U/L      Total Bilirubin 0.6 mg/dL      Globulin 3.0 gm/dL      A/G Ratio 1.4 g/dL      BUN/Creatinine Ratio 21.0     Anion Gap 15.9 mmol/L      eGFR 39.3 mL/min/1.73     Narrative:      GFR Normal >60  Chronic Kidney Disease <60  Kidney Failure <15    The GFR formula is only valid for adults with stable renal function between ages 18 and 70.    Lipase [[id number]]  (Normal) Collected: 04/19/23 1522    Specimen: Blood Updated: 04/19/23 1613     Lipase 35 U/L     Lactic Acid, Plasma [[id number]]  (Abnormal) Collected: 04/19/23 1522    Specimen: Blood Updated: 04/19/23 1611     Lactate 3.0 mmol/L     CBC Auto Differential [[id number]]  (Normal) Collected: 04/19/23 1522    Specimen: Blood Updated: 04/19/23 1529     WBC 7.25 10*3/mm3      RBC 4.10 10*6/mm3      Hemoglobin 12.7 g/dL      Hematocrit 37.9 %      MCV 92.4 fL      MCH 31.0 pg      MCHC 33.5 g/dL      RDW 14.6 %      RDW-SD 49.5 fl      MPV 10.9 fL      Platelets 212 10*3/mm3      Neutrophil % 63.1 %      Lymphocyte % 25.9 %      Monocyte % 9.8 %      Eosinophil % 0.8 %      Basophil % 0.3 %      Immature Grans % 0.1 %      Neutrophils, Absolute 4.57 10*3/mm3      Lymphocytes, Absolute 1.88 10*3/mm3      Monocytes, Absolute 0.71 10*3/mm3      Eosinophils, Absolute 0.06 10*3/mm3      Basophils, Absolute 0.02 10*3/mm3      Immature Grans, Absolute 0.01 10*3/mm3      nRBC 0.0 /100 WBC     STAT Lactic Acid, Reflex [[id number]]   (Normal) Collected: 04/19/23 1714    Specimen: Blood Updated: 04/19/23 1742     Lactate 1.9 mmol/L            Imaging:    CT Abdomen Pelvis Without Contrast    Result Date: 4/19/2023  PROCEDURE: CT ABDOMEN PELVIS WO CONTRAST  COMPARISON: [facility], CT, CTA ABDOMEN W/WO CONTRAST, 6/08/2018, 12:50.  INDICATIONS: LEFT UPPER QUADRANT PAIN X 3 MONTHS  TECHNIQUE: CT images were created without intravenous contrast.   PROTOCOL:   Standard imaging protocol performed    RADIATION:   DLP: 807.1 mGy*cm   Automated exposure control was utilized to minimize radiation dose.  FINDINGS:  The lung bases are clear.  The unenhanced liver, gallbladder, adrenal glands, kidneys, spleen, and pancreas are unremarkable.  There is a small hiatal hernia.  The small bowel appears normal in caliber and configuration.  The colon appears normal.  The appendix is surgically absent.  There is no ascites or loculated collection.  No abnormally enlarged lymph nodes are identified.  The rectum and urinary bladder are unremarkable.  The uterus has a lobular contour, likely secondary to a fibroid.  No aggressive osseous lesions are identified.        1. No acute process identified within abdomen/pelvis. 2. Small hiatal hernia. 3. Uterus has a lobular contour, likely secondary to a fibroid.     KYLE SANDOVAL MD       Electronically Signed and Approved By: KYLE SANDOVAL MD on 4/19/2023 at 18:20                 Differential Diagnosis and Discussion:      Abdominal Pain: Based on the patient's signs and symptoms, I considered abdominal aortic aneurysm, small bowel obstruction, pancreatitis, acute cholecystitis, acute appendecitis, peptic ulcer disease, gastritis, colitis, endocrine disorders, irritable bowel syndrome and other differential diagnosis an etiology of the patient's abdominal pain.    All labs were reviewed and interpreted by me.  EKG was interpreted by me.  CT scan radiology impression was interpreted by me.    MDM          Patient Care Considerations:    ANTIBIOTICS: I considered prescribing antibiotics as an outpatient however There is no evidence to support acute bacterial infection      Consultants/Shared Management Plan:    None    Social Determinants of Health:    Patient is independent, reliable, and has access to care.       Disposition and Care Coordination:    Discharged: I considered escalation of care by admitting this patient for observation, however the patient has improved and is suitable and  stable for discharge.    I have explained the patient´s condition, diagnoses and treatment plan based on the information available to me at this time. I have answered questions and addressed any concerns. The patient has a good  understanding of the patient´s diagnosis, condition, and treatment plan as can be expected at this point. The vital signs have been stable. The patient´s condition is stable and appropriate for discharge from the emergency department.      The patient will pursue further outpatient evaluation with the primary care physician or other designated or consulting physician as outlined in the discharge instructions. They are agreeable to this plan of care and follow-up instructions have been explained in detail. The patient has received these instructions in written format and have expressed an understanding of the discharge instructions. The patient is aware that any significant change in condition or worsening of symptoms should prompt an immediate return to this or the closest emergency department or call to 911.  I have explained discharge medications and the need for follow up with the patient/caretakers. This was also printed in the discharge instructions. Patient was discharged with the following medications and follow up:      Medication List      New Prescriptions    pantoprazole 40 MG EC tablet  Commonly known as: PROTONIX  Take 1 tablet by mouth Daily.           Where to Get Your Medications       These medications were sent to [id number] - [place], [place] - [address] AT [facility] [address] - [phone] PH - [phone] FX  [address], [facility] [address]    Phone: [phone]   · pantoprazole 40 MG EC tablet      Ej Santoyo MD  [address]  [address]  [place] [id number] [phone]    In 2 days         Final diagnoses:   Left upper quadrant abdominal pain        ED Disposition     ED Disposition   Discharge    Condition   Stable    Comment   --             This medical record created using voice recognition software.    Documentation assistance provided by Modesto Bloom acting as scribe for No att. providers found. Information recorded by the scribe was done at my direction and has been verified and validated by me.          Modesto Bloom  04/19/23 4094       Yadiel Roche DO  04/20/23 1112

## 2023-04-20 NOTE — DISCHARGE INSTRUCTIONS
Continue with your current home medications.  Take the new prescription that I prescribed for you today as directed.  Up with your PCP on Friday as scheduled.  Discuss having them set up an outpatient EGD.  Return to the ER for any new or worsening symptoms.

## 2023-04-21 ENCOUNTER — OFFICE VISIT (OUTPATIENT)
Dept: FAMILY MEDICINE CLINIC | Facility: CLINIC | Age: 78
End: 2023-04-21
Payer: MEDICARE

## 2023-04-21 VITALS
TEMPERATURE: 96.9 F | HEART RATE: 64 BPM | OXYGEN SATURATION: 100 % | RESPIRATION RATE: 16 BRPM | BODY MASS INDEX: 35.63 KG/M2 | HEIGHT: 68 IN | SYSTOLIC BLOOD PRESSURE: 124 MMHG | DIASTOLIC BLOOD PRESSURE: 72 MMHG | WEIGHT: 235.1 LBS

## 2023-04-21 DIAGNOSIS — M25.551 CHRONIC PAIN OF RIGHT HIP: ICD-10-CM

## 2023-04-21 DIAGNOSIS — Z79.899 MEDICATION MANAGEMENT: ICD-10-CM

## 2023-04-21 DIAGNOSIS — G89.29 CHRONIC PAIN OF RIGHT HIP: ICD-10-CM

## 2023-04-21 DIAGNOSIS — M25.561 CHRONIC PAIN OF RIGHT KNEE: ICD-10-CM

## 2023-04-21 DIAGNOSIS — D22.9 MULTIPLE ATYPICAL SKIN MOLES: ICD-10-CM

## 2023-04-21 DIAGNOSIS — Z12.31 ENCOUNTER FOR SCREENING MAMMOGRAM FOR MALIGNANT NEOPLASM OF BREAST: ICD-10-CM

## 2023-04-21 DIAGNOSIS — G89.29 CHRONIC PAIN OF RIGHT KNEE: ICD-10-CM

## 2023-04-21 DIAGNOSIS — Z12.83 SKIN EXAM FOR MALIGNANT NEOPLASM: ICD-10-CM

## 2023-04-21 DIAGNOSIS — K21.9 GASTROESOPHAGEAL REFLUX DISEASE WITHOUT ESOPHAGITIS: Primary | ICD-10-CM

## 2023-04-21 DIAGNOSIS — N18.31 STAGE 3A CHRONIC KIDNEY DISEASE: ICD-10-CM

## 2023-04-21 PROCEDURE — 1159F MED LIST DOCD IN RCRD: CPT | Performed by: STUDENT IN AN ORGANIZED HEALTH CARE EDUCATION/TRAINING PROGRAM

## 2023-04-21 PROCEDURE — 1160F RVW MEDS BY RX/DR IN RCRD: CPT | Performed by: STUDENT IN AN ORGANIZED HEALTH CARE EDUCATION/TRAINING PROGRAM

## 2023-04-21 PROCEDURE — 99214 OFFICE O/P EST MOD 30 MIN: CPT | Performed by: STUDENT IN AN ORGANIZED HEALTH CARE EDUCATION/TRAINING PROGRAM

## 2023-04-21 RX ORDER — OMEPRAZOLE 40 MG/1
40 CAPSULE, DELAYED RELEASE ORAL DAILY
Qty: 90 CAPSULE | OUTPATIENT
Start: 2023-04-21

## 2023-04-21 RX ORDER — OMEPRAZOLE 40 MG/1
40 CAPSULE, DELAYED RELEASE ORAL DAILY
Qty: 30 CAPSULE | Refills: 0 | Status: SHIPPED | OUTPATIENT
Start: 2023-04-21

## 2023-04-21 NOTE — PROGRESS NOTES
"Chief Complaint  Following up on GERD/CKD/chronic pain    Subjective         Sue Simmons is a 77 y.o. adult who presents to [facility]    77 years old comes to the clinic today to follow-up.    Daughter is present in the room.    Patient reports worsening right hip and right knee pain which has been going on for many months.  Patient had imaging work-up done in the past with moderate arthritis.  Patient does take Tylenol with somewhat relief but would like to see orthopedic due to significant worsening.    CKD; repeat blood work needed to follow-up    Patient has a history of precancerous skin lesions in the past, would like to see dermatologist for annual exams for atypical skin moles.    Denies any chest pain or shortness of breath, somewhat active at home without any difficulties.    Objective   Vital Signs:   Vitals:    04/21/23 1351   BP: 124/72   BP Location: Left arm   Patient Position: Sitting   Cuff Size: Large Adult   Pulse: 64   Resp: 16   Temp: 96.9 °F (36.1 °C)   TempSrc: Temporal   SpO2: 100%   Weight: 107 kg (235 lb 1.6 oz)   Height: 172.7 cm (68\")      Body mass index is 35.75 kg/m².   Wt Readings from Last 3 Encounters:   04/21/23 107 kg (235 lb 1.6 oz)   04/19/23 104 kg (230 lb)   04/14/23 107 kg (235 lb 6.4 oz)      BP Readings from Last 3 Encounters:   04/21/23 124/72   04/19/23 132/84   04/14/23 128/81        Patient Care Team:  Ej Santoyo MD as PCP - General (Family Medicine)     Physical Exam  Vitals reviewed.   Constitutional:       Appearance: Normal appearance. Sue Simmons is well-developed.   HENT:      Head: Normocephalic and atraumatic.      Right Ear: External ear normal.      Left Ear: External ear normal.      Mouth/Throat:      Pharynx: No oropharyngeal exudate.   Eyes:      Conjunctiva/sclera: Conjunctivae normal.      Pupils: Pupils are equal, round, and reactive to light.   Cardiovascular:      Rate and Rhythm: Normal rate and regular " rhythm.      Heart sounds: No murmur heard.    No friction rub. No gallop.   Pulmonary:      Effort: Pulmonary effort is normal.      Breath sounds: Normal breath sounds. No wheezing or rhonchi.   Abdominal:      General: Bowel sounds are normal. There is no distension.      Palpations: Abdomen is soft.      Tenderness: There is no abdominal tenderness.   Skin:     General: Skin is warm and dry.   Neurological:      Mental Status: Sue Simmons is alert and oriented to person, place, and time.      Cranial Nerves: No cranial nerve deficit.   Psychiatric:         Mood and Affect: Mood and affect normal.         Behavior: Behavior normal.         Thought Content: Thought content normal.         Judgment: Judgment normal.                       Assessment and Plan   Diagnoses and all orders for this visit:    1. Gastroesophageal reflux disease without esophagitis (Primary)  Comments:  Omeprazole prescribed, only for 1 month due to kidneys  Orders:  -     Basic metabolic panel; Future    2. Stage 3a chronic kidney disease  Comments:  Mild worsening, repeat blood work needed.  Orders:  -     Basic metabolic panel; Future    3. Chronic pain of right hip  Comments:  Likely due to degenerative changes, patient agrees to see orthopedic  Orders:  -     Ambulatory Referral to Orthopedic Surgery  -     Basic metabolic panel; Future    4. Chronic pain of right knee  Comments:  Likely due to chronic arthritis, patient agrees to see orthopedic  Orders:  -     Ambulatory Referral to Orthopedic Surgery  -     Basic metabolic panel; Future    5. Skin exam for malignant neoplasm  -     Ambulatory Referral to Dermatology    6. Multiple atypical skin moles  -     Ambulatory Referral to Dermatology    7. Encounter for screening mammogram for malignant neoplasm of breast  -     Mammo Screening Digital Tomosynthesis Bilateral With CAD; Future    8. Medication management  Comments:  Compliant with gabapentin    Other orders  -      omeprazole (priLOSEC) 40 MG capsule; Take 1 capsule by mouth Daily.  Dispense: 30 capsule; Refill: 0          Tobacco Use: Medium Risk   • Smoking Tobacco Use: Former   • Smokeless Tobacco Use: Never   • Passive Exposure: Not on file            Follow Up   Return if symptoms worsen or fail to improve.  Patient was given instructions and counseling regarding Sue A Friess's condition or for health maintenance advice. Please see specific information pulled into the AVS if appropriate.

## 2023-04-24 DIAGNOSIS — K21.9 GASTROESOPHAGEAL REFLUX DISEASE WITHOUT ESOPHAGITIS: Primary | ICD-10-CM

## 2023-04-28 ENCOUNTER — HOSPITAL ENCOUNTER (OUTPATIENT)
Dept: ULTRASOUND IMAGING | Facility: HOSPITAL | Age: 78
Discharge: HOME | End: 2023-04-28
Payer: MEDICARE

## 2023-05-02 ENCOUNTER — OFFICE VISIT (OUTPATIENT)
Dept: ORTHOPEDIC SURGERY | Facility: CLINIC | Age: 78
End: 2023-05-02
Payer: MEDICARE

## 2023-05-02 VITALS — BODY MASS INDEX: 35.61 KG/M2 | WEIGHT: 235 LBS | HEIGHT: 68 IN

## 2023-05-02 DIAGNOSIS — M25.561 RIGHT KNEE PAIN, UNSPECIFIED CHRONICITY: Primary | ICD-10-CM

## 2023-05-02 DIAGNOSIS — M70.61 TROCHANTERIC BURSITIS OF RIGHT HIP: ICD-10-CM

## 2023-05-02 DIAGNOSIS — M16.0 OSTEOARTHRITIS OF BOTH HIPS, UNSPECIFIED OSTEOARTHRITIS TYPE: ICD-10-CM

## 2023-05-02 DIAGNOSIS — M25.551 RIGHT HIP PAIN: ICD-10-CM

## 2023-05-02 DIAGNOSIS — M17.11 PRIMARY OSTEOARTHRITIS OF RIGHT KNEE: ICD-10-CM

## 2023-05-02 RX ORDER — LIDOCAINE HYDROCHLORIDE 10 MG/ML
5 INJECTION, SOLUTION EPIDURAL; INFILTRATION; INTRACAUDAL; PERINEURAL
Status: COMPLETED | OUTPATIENT
Start: 2023-05-02 | End: 2023-05-02

## 2023-05-02 RX ORDER — TRIAMCINOLONE ACETONIDE 40 MG/ML
40 INJECTION, SUSPENSION INTRA-ARTICULAR; INTRAMUSCULAR
Status: COMPLETED | OUTPATIENT
Start: 2023-05-02 | End: 2023-05-02

## 2023-05-02 RX ADMIN — TRIAMCINOLONE ACETONIDE 40 MG: 40 INJECTION, SUSPENSION INTRA-ARTICULAR; INTRAMUSCULAR at 11:12

## 2023-05-02 RX ADMIN — LIDOCAINE HYDROCHLORIDE 5 ML: 10 INJECTION, SOLUTION EPIDURAL; INFILTRATION; INTRACAUDAL; PERINEURAL at 11:13

## 2023-05-02 RX ADMIN — LIDOCAINE HYDROCHLORIDE 5 ML: 10 INJECTION, SOLUTION EPIDURAL; INFILTRATION; INTRACAUDAL; PERINEURAL at 11:12

## 2023-05-02 RX ADMIN — TRIAMCINOLONE ACETONIDE 40 MG: 40 INJECTION, SUSPENSION INTRA-ARTICULAR; INTRAMUSCULAR at 11:13

## 2023-05-02 NOTE — PROGRESS NOTES
"Chief Complaint  Pain and Initial Evaluation of the Right Hip and Pain and Initial Evaluation of the Right Knee     Subjective      Sue Simmons presents to [facility] for evaluation of the right lower extremity. She reports right hip and knee pain for awhile. She denies injury or trauma. She reports it comes and goes but has been constant for a month. She has tried topicals without relief. She reports pain with walking. She is at [facility]. She reports at times she struggles get her leg in and out of a car.     Allergies   Allergen Reactions   • Sulfa Antibiotics Itching        Social History     Socioeconomic History   • Marital status:    Tobacco Use   • Smoking status: Former     Years: 60.00     Types: Cigarettes     Quit date: 2022     Years since quittin.8   • Smokeless tobacco: Never   • Tobacco comments:     quit at age 59, second hand smoke exposure- never   Vaping Use   • Vaping Use: Never used   Substance and Sexual Activity   • Alcohol use: Not Currently     Comment: light   • Drug use: Never   • Sexual activity: Not Currently        Review of Systems     Objective   Vital Signs:   Ht 172.7 cm (68\")   Wt 107 kg (235 lb)   BMI 35.73 kg/m²       Physical Exam  Constitutional:       Appearance: Normal appearance. The patient is well-developed and normal weight.   HENT:      Head: Normocephalic.      Right Ear: Hearing and external ear normal.      Left Ear: Hearing and external ear normal.      Nose: Nose normal.   Eyes:      Conjunctiva/sclera: Conjunctivae normal.   Cardiovascular:      Rate and Rhythm: Normal rate.   Pulmonary:      Effort: Pulmonary effort is normal.      Breath sounds: No wheezing or rales.   Abdominal:      Palpations: Abdomen is soft.      Tenderness: There is no abdominal tenderness.   Musculoskeletal:      Cervical back: Normal range of motion.   Skin:     Findings: No rash.   Neurological:      Mental Status: The patient " is alert and oriented to person, place, and time.   Psychiatric:         Mood and Affect: Mood and affect normal.         Judgment: Judgment normal.       Ortho Exam      Right lower extremity- mild swelling to the knee. Tender to the medial joint line and lateral joint line. ROM -5 to 115 degrees. Stable to varus/valgus stress. Flexion 80 with pain. External Rotation 30. Internal rotation 20. Positive EHL, FHL, GS and TA. Sensation intact to all 5 nerves of the foot. Positive pulses. Tender to the lateral hip over the greater trochanter.     Right hip  Date/Time: 5/2/2023 11:12 AM  Consent given by: patient  Site marked: site marked  Timeout: Immediately prior to procedure a time out was called to verify the correct patient, procedure, equipment, support staff and site/side marked as required   Supporting Documentation  Indications: pain   Procedure Details  Location: hip -   Needle size: 22 G  Medications administered: 5 mL lidocaine PF 1% 1 %; 40 mg triamcinolone acetonide 40 MG/ML  Patient tolerance: patient tolerated the procedure well with no immediate complications    Right knee: R knee  Date/Time: 5/2/2023 11:13 AM  Consent given by: patient  Site marked: site marked  Timeout: Immediately prior to procedure a time out was called to verify the correct patient, procedure, equipment, support staff and site/side marked as required   Supporting Documentation  Indications: pain   Procedure Details  Location: knee - R knee  Needle gauge: 21G.  Medications administered: 5 mL lidocaine PF 1% 1 %; 40 mg triamcinolone acetonide 40 MG/ML  Patient tolerance: patient tolerated the procedure well with no immediate complications          X-Ray Report:  Right hip X-Ray  Indication: Evaluation of right hip pain  AP/Lateral view(s)  Findings: no acute fracture. Moderate degenerative changes to the right hip, advanced degenerative changes to the left hip.   Prior studies available for comparison: no     X-Ray Report:  Right  knee X-Ray  Indication: Evaluation of right knee pain  AP/Lateral, Standing and Sunrise view(s)  Findings: moderate degenerative changes to the knee. No acute fracture. Medial joint space narrowing. Enthesophytes to the patella.   Prior studies available for comparison: no           Imaging Results (Most Recent)     Procedure Component Value Units Date/Time    XR Knee 3 View Right [[id number]] Resulted: 05/02/23 1054     Updated: 05/02/23 1101    XR Hip With or Without Pelvis 2 - 3 View Right [[id number]] Resulted: 05/02/23 1054     Updated: 05/02/23 1101           Result Review :       CT Abdomen Pelvis Without Contrast    Result Date: 4/19/2023  Narrative: PROCEDURE: CT ABDOMEN PELVIS WO CONTRAST  COMPARISON: [facility], CT, CTA ABDOMEN W/WO CONTRAST, 6/08/2018, 12:50.  INDICATIONS: LEFT UPPER QUADRANT PAIN X 3 MONTHS  TECHNIQUE: CT images were created without intravenous contrast.   PROTOCOL:   Standard imaging protocol performed    RADIATION:   DLP: 807.1 mGy*cm   Automated exposure control was utilized to minimize radiation dose.  FINDINGS:  The lung bases are clear.  The unenhanced liver, gallbladder, adrenal glands, kidneys, spleen, and pancreas are unremarkable.  There is a small hiatal hernia.  The small bowel appears normal in caliber and configuration.  The colon appears normal.  The appendix is surgically absent.  There is no ascites or loculated collection.  No abnormally enlarged lymph nodes are identified.  The rectum and urinary bladder are unremarkable.  The uterus has a lobular contour, likely secondary to a fibroid.  No aggressive osseous lesions are identified.      Impression:   1. No acute process identified within abdomen/pelvis. 2. Small hiatal hernia. 3. Uterus has a lobular contour, likely secondary to a fibroid.     KYLE SANDOVAL MD       Electronically Signed and Approved By: KYLE SANDOVAL MD on 4/19/2023 at 18:20                      Assessment and Plan     Diagnoses  and all orders for this visit:    1. Right knee pain, unspecified chronicity (Primary)  -     XR Knee 3 View Right    2. Right hip pain  -     XR Hip With or Without Pelvis 2 - 3 View Right    3. Osteoarthritis of both hips, unspecified osteoarthritis type    4. Primary osteoarthritis of right knee    5. Trochanteric bursitis of right hip        Discussed the treatment plan with the patient.  I reviewed the x-rays that were obtained today with the patient. Plan for conservative treatment at this time. Discussed the risks and benefits of a right knee and right hip bursa injections. The patient expressed understanding and wished to proceed. She tolerated the injections well. Plan to proceed with physical therapy.     Call or return if worsening symptoms.    Follow Up     6 weeks      Patient was given instructions and counseling regarding Sue A Friess's condition or for health maintenance advice. Please see specific information pulled into the AVS if appropriate.     Scribed for Moose Meadows MD by Laura Park.  05/02/23   11:05 EDT    I have personally performed the services described in this document as scribed by the above individual and it is both accurate and complete. Moose Meadows MD 05/02/23

## 2023-05-08 ENCOUNTER — OFFICE VISIT (OUTPATIENT)
Dept: SURGERY | Facility: CLINIC | Age: 78
End: 2023-05-08
Payer: MEDICARE

## 2023-05-08 VITALS — RESPIRATION RATE: 16 BRPM | HEIGHT: 68 IN | BODY MASS INDEX: 34.56 KG/M2 | WEIGHT: 228 LBS

## 2023-05-08 DIAGNOSIS — K21.9 GASTROESOPHAGEAL REFLUX DISEASE, UNSPECIFIED WHETHER ESOPHAGITIS PRESENT: Primary | ICD-10-CM

## 2023-05-08 RX ORDER — PANTOPRAZOLE SODIUM 40 MG/1
TABLET, DELAYED RELEASE ORAL
COMMUNITY
Start: 2023-04-24

## 2023-05-08 NOTE — PROGRESS NOTES
Chief Complaint:  Heartburn    Primary Care Provider: Ej Santoyo MD    Referring Provider: Ej Santoyo MD    History of Present Illness  Sue Simmons is a 77 y.o. adult referred by Ej Santoyo MD to talk about possibly having an EGD.  The patient lives at a nursing facility.  She has Alzheimer's.  The patient is here with her daughter.  The patient can communicate with me but is unclear whether the communication is reliable.  Nevertheless, the patient was having some epigastric pain and GERD.  She went to the emergency room to rule out cardiac etiology.  Cardiac etiology was ruled out.  CT scan was done and the findings were unremarkable except for the presence of a small hiatal hernia.  Patient was prescribed omeprazole 40 mg daily.  She started taking it about 3 weeks ago and says the symptoms have greatly improved.  Patient does not have any difficulty swallowing.    Allergies: Sulfa antibiotics    Outpatient Medications Marked as Taking for the 5/8/23 encounter (Office Visit) with Jez Castro MD   Medication Sig Dispense Refill   • ammonium lactate (AmLactin) 12 % lotion Apply  topically to the appropriate area as directed 2 (Two) Times a Day. 225 g 11   • furosemide (LASIX) 40 MG tablet Take 1 tablet by mouth 2 (Two) Times a Day As Needed (swelling). 180 tablet 1   • gabapentin (NEURONTIN) 100 MG capsule Take 1 capsule by mouth 2 (Two) Times a Day. 90 capsule 1   • levothyroxine (Synthroid) 112 MCG tablet Take 1 tablet by mouth Daily. 90 tablet 1   • lisinopril (PRINIVIL,ZESTRIL) 10 MG tablet Take 1 tablet by mouth Daily. 30 tablet 1   • omeprazole (priLOSEC) 40 MG capsule Take 1 capsule by mouth Daily. 30 capsule 0   • pantoprazole (PROTONIX) 40 MG EC tablet      • QUEtiapine (SEROquel) 25 MG tablet TAKE 1 TABLET BY MOUTH EVERY NIGHT 30 tablet 1   • spironolactone (Aldactone) 25 MG tablet Take 1 tablet by mouth Daily As Needed (swelling). 30 tablet 1       Past Medical History:   • Anemia   •  "Arthritis   • Back pain with radiation   • Breast lump   • Cancer   • Cancer of right breast    had lumpectomy, undergoing treatment at Dr. Rubi every Tuesday for 6 months, then radiation   • Difficulty walking   • Foot pain, right   • Hemorrhoids   • Hypertension   • Hypothyroidism   • IC (interstitial cystitis)   • Ingrowing toenail   • Microscopic hematuria   • Mood disorder   • Plantar fasciitis   • PONV (postoperative nausea and vomiting)    possibly   • Rectal bleeding   • Vitamin D deficiency        Past Surgical History:   • APPENDECTOMY   • BREAST BIOPSY    right side   • BREAST LUMPECTOMY   • BREAST SURGERY   • COLONOSCOPY   • CYST REMOVAL    removed form coccyx   • CYSTOSCOPY   • EYE SURGERY    lasix   • HARDWARE REMOVAL   • PORTACATH PLACEMENT   • TOENAIL EXCISION   • TONSILLECTOMY       Family History:   Family History   Problem Relation Age of Onset   • Lung cancer Mother    • Brain cancer Mother    • Diabetes Mother         due to Graves Disease   • Throat cancer Mother    • Arthritis Mother    • Cancer Mother         Lung Cancer   • Thyroid disease Mother         Graves Disease   • Heart disease Father    • Breast cancer Father         40s   • Arthritis Father    • Cancer Father         Breast Cancer   • Other Other         heart surgery in    • Alzheimer's disease Other    • Graves' disease Other    • Diabetes Maternal Aunt         unspecified type   • Diabetes Maternal Uncle         unspecified type   • Kidney nephrosis Maternal Uncle         Social History:  Social History     Tobacco Use   • Smoking status: Former     Years: 60.00     Types: Cigarettes     Quit date: 2022     Years since quittin.8   • Smokeless tobacco: Never   • Tobacco comments:     quit at age 59, second hand smoke exposure- never   Substance Use Topics   • Alcohol use: Not Currently     Comment: light       Objective     Vital Signs:  Resp 16   Ht 172.7 cm (68\")   Wt 103 kg (228 lb)   BMI 34.67 kg/m² "   • Constitutional: Daughter present, alert, no acute distress  • Respiratory:  breathing not labored, respiratory effort appears normal  • Cardiovascular:  heart regular rate  • Abdomen:  soft, nontender, nondistended    • Skin and subcutaneous tissue:  Warm and dry  • Musculoskeletal: moving all extremities symmetrically and purposefully  • Neurologic:  no obvious motor or sensory deficits,       Assessment:  GERD  Hiatal Hernia    Plan:  The GERD symptoms have improved significantly after patient was started on omeprazole 40 mg daily.  She has a known small hiatal hernia that was seen on CT scan.  We talked about the option of doing an EGD but decided together to defer doing that as there is very little chance it would result in any change in management.  Recommend patient continue taking an antiacid medication.  The patient and her daughter both seem pleased with this plan.  Patient will see me PRN.      Jez Castro MD  05/08/2023    Electronically signed by Jez Castro MD, 05/08/23, 3:56 PM EDT.

## 2023-05-08 NOTE — LETTER
May 8, 2023     Ej Santoyo MD  [address]  [address]  [place] [address]    Patient: Sue Simmons   YOB: 1945   Date of Visit: 5/8/2023       Dear Dr. Natanael MD:    Thank you for referring Sue Simmons to me for evaluation.  See the assessment and plan section at the bottom of my note included below for feedback.  Call me on my cell phone at [phone] with any questions.  Thank you for the referral.         Sincerely,        Jez Castro MD        CC: No Recipients    Jez Castro MD  05/08/23 1630  Sign when Signing Visit  Chief Complaint:  Heartburn    Primary Care Provider: Ej Santoyo MD    Referring Provider: Ej Santoyo MD    History of Present Illness  Sue Simmons is a 77 y.o. adult referred by Ej Santoyo MD to talk about possibly having an EGD.  The patient lives at a nursing facility.  She has Alzheimer's.  The patient is here with her daughter.  The patient can communicate with me but is unclear whether the communication is reliable.  Nevertheless, the patient was having some epigastric pain and GERD.  She went to the emergency room to rule out cardiac etiology.  Cardiac etiology was ruled out.  CT scan was done and the findings were unremarkable except for the presence of a small hiatal hernia.  Patient was prescribed omeprazole 40 mg daily.  She started taking it about 3 weeks ago and says the symptoms have greatly improved.  Patient does not have any difficulty swallowing.    Allergies: Sulfa antibiotics    Outpatient Medications Marked as Taking for the 5/8/23 encounter (Office Visit) with Jez Castro MD   Medication Sig Dispense Refill   • ammonium lactate (AmLactin) 12 % lotion Apply  topically to the appropriate area as directed 2 (Two) Times a Day. 225 g 11   • furosemide (LASIX) 40 MG tablet Take 1 tablet by mouth 2 (Two) Times a Day As Needed (swelling). 180 tablet 1   • gabapentin (NEURONTIN) 100 MG capsule Take 1 capsule by mouth 2 (Two) Times a  Day. 90 capsule 1   • levothyroxine (Synthroid) 112 MCG tablet Take 1 tablet by mouth Daily. 90 tablet 1   • lisinopril (PRINIVIL,ZESTRIL) 10 MG tablet Take 1 tablet by mouth Daily. 30 tablet 1   • omeprazole (priLOSEC) 40 MG capsule Take 1 capsule by mouth Daily. 30 capsule 0   • pantoprazole (PROTONIX) 40 MG EC tablet      • QUEtiapine (SEROquel) 25 MG tablet TAKE 1 TABLET BY MOUTH EVERY NIGHT 30 tablet 1   • spironolactone (Aldactone) 25 MG tablet Take 1 tablet by mouth Daily As Needed (swelling). 30 tablet 1       Past Medical History:   • Anemia   • Arthritis   • Back pain with radiation   • Breast lump   • Cancer   • Cancer of right breast    had lumpectomy, undergoing treatment at Dr. Campoverde's every Tuesday for 6 months, then radiation   • Difficulty walking   • Foot pain, right   • Hemorrhoids   • Hypertension   • Hypothyroidism   • IC (interstitial cystitis)   • Ingrowing toenail   • Microscopic hematuria   • Mood disorder   • Plantar fasciitis   • PONV (postoperative nausea and vomiting)    possibly   • Rectal bleeding   • Vitamin D deficiency        Past Surgical History:   • APPENDECTOMY   • BREAST BIOPSY    right side   • BREAST LUMPECTOMY   • BREAST SURGERY   • COLONOSCOPY   • CYST REMOVAL    removed form coccyx   • CYSTOSCOPY   • EYE SURGERY    lasix   • HARDWARE REMOVAL   • PORTACATH PLACEMENT   • TOENAIL EXCISION   • TONSILLECTOMY       Family History:   Family History   Problem Relation Age of Onset   • Lung cancer Mother    • Brain cancer Mother    • Diabetes Mother         due to Graves Disease   • Throat cancer Mother    • Arthritis Mother    • Cancer Mother         Lung Cancer   • Thyroid disease Mother         Graves Disease   • Heart disease Father    • Breast cancer Father         40s   • Arthritis Father    • Cancer Father         Breast Cancer   • Other Other         heart surgery in 2005   • Alzheimer's disease Other    • Graves' disease Other    • Diabetes Maternal Aunt          "unspecified type   • Diabetes Maternal Uncle         unspecified type   • Kidney nephrosis Maternal Uncle         Social History:  Social History     Tobacco Use   • Smoking status: Former     Years: 60.00     Types: Cigarettes     Quit date: 2022     Years since quittin.8   • Smokeless tobacco: Never   • Tobacco comments:     quit at age 59, second hand smoke exposure- never   Substance Use Topics   • Alcohol use: Not Currently     Comment: light       Objective      Vital Signs:  Resp 16   Ht 172.7 cm (68\")   Wt 103 kg (228 lb)   BMI 34.67 kg/m²   Constitutional: Daughter present, alert, no acute distress  Respiratory:  breathing not labored, respiratory effort appears normal  Cardiovascular:  heart regular rate  Abdomen:  soft, nontender, nondistended    Skin and subcutaneous tissue:  Warm and dry  Musculoskeletal: moving all extremities symmetrically and purposefully  Neurologic:  no obvious motor or sensory deficits,       Assessment:  GERD  Hiatal Hernia    Plan:  The GERD symptoms have improved significantly after patient was started on omeprazole 40 mg daily.  She has a known small hiatal hernia that was seen on CT scan.  We talked about the option of doing an EGD but decided together to defer doing that as there is very little chance it would result in any change in management.  Recommend patient continue taking an antiacid medication.  The patient and her daughter both seem pleased with this plan.  Patient will see me PRN.      Jez Castro MD  2023    Electronically signed by Jez Castro MD, 23, 3:56 PM EDT.      "

## 2023-05-09 DIAGNOSIS — R41.89 COGNITIVE AND BEHAVIORAL CHANGES: ICD-10-CM

## 2023-05-09 DIAGNOSIS — R60.1 ANASARCA: ICD-10-CM

## 2023-05-09 DIAGNOSIS — R46.89 COGNITIVE AND BEHAVIORAL CHANGES: ICD-10-CM

## 2023-05-09 DIAGNOSIS — M79.89 LEG SWELLING: ICD-10-CM

## 2023-05-09 RX ORDER — SPIRONOLACTONE 25 MG/1
TABLET ORAL
Qty: 30 TABLET | Refills: 1 | Status: SHIPPED | OUTPATIENT
Start: 2023-05-09

## 2023-05-09 RX ORDER — QUETIAPINE FUMARATE 25 MG/1
25 TABLET, FILM COATED ORAL NIGHTLY
Qty: 30 TABLET | Refills: 1 | Status: SHIPPED | OUTPATIENT
Start: 2023-05-09

## 2023-05-11 DIAGNOSIS — M79.89 LEG SWELLING: ICD-10-CM

## 2023-05-11 DIAGNOSIS — R60.1 ANASARCA: ICD-10-CM

## 2023-05-11 RX ORDER — SPIRONOLACTONE 25 MG/1
TABLET ORAL
Qty: 90 TABLET | OUTPATIENT
Start: 2023-05-11

## 2023-05-18 RX ORDER — OMEPRAZOLE 40 MG/1
40 CAPSULE, DELAYED RELEASE ORAL DAILY
Qty: 90 CAPSULE | Refills: 1 | Status: SHIPPED | OUTPATIENT
Start: 2023-05-18

## 2023-05-20 DIAGNOSIS — Z79.899 MEDICATION MANAGEMENT: ICD-10-CM

## 2023-05-20 DIAGNOSIS — R20.0 NUMBNESS AND TINGLING OF LOWER EXTREMITY: ICD-10-CM

## 2023-05-20 DIAGNOSIS — R20.2 NUMBNESS AND TINGLING OF LOWER EXTREMITY: ICD-10-CM

## 2023-05-22 RX ORDER — GABAPENTIN 100 MG/1
CAPSULE ORAL
Qty: 90 CAPSULE | Refills: 1 | Status: SHIPPED | OUTPATIENT
Start: 2023-05-22

## 2023-05-30 DIAGNOSIS — M62.838 MUSCLE SPASM: ICD-10-CM

## 2023-05-30 DIAGNOSIS — R10.9 ABDOMINAL PAIN, UNSPECIFIED ABDOMINAL LOCATION: Primary | ICD-10-CM

## 2023-05-30 DIAGNOSIS — R19.5 LOOSE STOOLS: ICD-10-CM

## 2023-05-30 RX ORDER — METHOCARBAMOL 500 MG/1
500 TABLET, FILM COATED ORAL 2 TIMES DAILY PRN
Qty: 30 TABLET | Refills: 0 | Status: SHIPPED | OUTPATIENT
Start: 2023-05-30

## 2023-06-01 ENCOUNTER — TELEPHONE (OUTPATIENT)
Dept: FAMILY MEDICINE CLINIC | Facility: CLINIC | Age: 78
End: 2023-06-01

## 2023-06-01 ENCOUNTER — HOSPITAL ENCOUNTER (EMERGENCY)
Facility: HOSPITAL | Age: 78
Discharge: HOME | End: 2023-06-01
Attending: EMERGENCY MEDICINE
Payer: MEDICARE

## 2023-06-01 ENCOUNTER — APPOINTMENT (OUTPATIENT)
Dept: CT IMAGING | Facility: HOSPITAL | Age: 78
End: 2023-06-01
Payer: MEDICARE

## 2023-06-01 VITALS
TEMPERATURE: 97.7 F | RESPIRATION RATE: 14 BRPM | HEART RATE: 68 BPM | DIASTOLIC BLOOD PRESSURE: 50 MMHG | OXYGEN SATURATION: 100 % | SYSTOLIC BLOOD PRESSURE: 122 MMHG

## 2023-06-01 DIAGNOSIS — R19.7 DIARRHEA, UNSPECIFIED TYPE: Primary | ICD-10-CM

## 2023-06-01 DIAGNOSIS — E86.0 DEHYDRATION: ICD-10-CM

## 2023-06-01 LAB
ALBUMIN SERPL-MCNC: 3.9 G/DL (ref 3.5–5.2)
ALBUMIN/GLOB SERPL: 1.3 G/DL
ALP SERPL-CCNC: 56 U/L (ref 39–117)
ALT SERPL W P-5'-P-CCNC: 13 U/L (ref 1–33)
ANION GAP SERPL CALCULATED.3IONS-SCNC: 11.3 MMOL/L (ref 5–15)
AST SERPL-CCNC: 12 U/L (ref 1–32)
BASOPHILS # BLD AUTO: 0.03 10*3/MM3 (ref 0–0.2)
BASOPHILS NFR BLD AUTO: 0.3 % (ref 0–1.5)
BILIRUB SERPL-MCNC: 0.4 MG/DL (ref 0–1.2)
BILIRUB UR QL STRIP: NEGATIVE
BUN SERPL-MCNC: 51 MG/DL (ref 8–23)
BUN/CREAT SERPL: 29.3 (ref 7–25)
CALCIUM SPEC-SCNC: 9.4 MG/DL (ref 8.6–10.5)
CHLORIDE SERPL-SCNC: 98 MMOL/L (ref 98–107)
CLARITY UR: CLEAR
CO2 SERPL-SCNC: 24.7 MMOL/L (ref 22–29)
COLOR UR: YELLOW
CREAT SERPL-MCNC: 1.74 MG/DL (ref 0.57–1)
D-LACTATE SERPL-SCNC: 1 MMOL/L (ref 0.5–2)
DEPRECATED RDW RBC AUTO: 51.8 FL (ref 37–54)
EGFRCR SERPLBLD CKD-EPI 2021: 29.7 ML/MIN/1.73
EOSINOPHIL # BLD AUTO: 0.08 10*3/MM3 (ref 0–0.4)
EOSINOPHIL NFR BLD AUTO: 0.9 % (ref 0.3–6.2)
ERYTHROCYTE [DISTWIDTH] IN BLOOD BY AUTOMATED COUNT: 14.8 % (ref 12.3–15.4)
GLOBULIN UR ELPH-MCNC: 2.9 GM/DL
GLUCOSE SERPL-MCNC: 109 MG/DL (ref 65–99)
GLUCOSE UR STRIP-MCNC: NEGATIVE MG/DL
HCT VFR BLD AUTO: 33.4 % (ref 34–46.6)
HGB BLD-MCNC: 11.1 G/DL (ref 12–15.9)
HGB UR QL STRIP.AUTO: NEGATIVE
HOLD SPECIMEN: NORMAL
HOLD SPECIMEN: NORMAL
IMM GRANULOCYTES # BLD AUTO: 0.04 10*3/MM3 (ref 0–0.05)
IMM GRANULOCYTES NFR BLD AUTO: 0.4 % (ref 0–0.5)
KETONES UR QL STRIP: NEGATIVE
LEUKOCYTE ESTERASE UR QL STRIP.AUTO: NEGATIVE
LIPASE SERPL-CCNC: 36 U/L (ref 13–60)
LYMPHOCYTES # BLD AUTO: 1.66 10*3/MM3 (ref 0.7–3.1)
LYMPHOCYTES NFR BLD AUTO: 18.7 % (ref 19.6–45.3)
MCH RBC QN AUTO: 31.3 PG (ref 26.6–33)
MCHC RBC AUTO-ENTMCNC: 33.2 G/DL (ref 31.5–35.7)
MCV RBC AUTO: 94.1 FL (ref 79–97)
MONOCYTES # BLD AUTO: 0.72 10*3/MM3 (ref 0.1–0.9)
MONOCYTES NFR BLD AUTO: 8.1 % (ref 5–12)
NEUTROPHILS NFR BLD AUTO: 6.36 10*3/MM3 (ref 1.7–7)
NEUTROPHILS NFR BLD AUTO: 71.6 % (ref 42.7–76)
NITRITE UR QL STRIP: NEGATIVE
NRBC BLD AUTO-RTO: 0 /100 WBC (ref 0–0.2)
PH UR STRIP.AUTO: 5.5 [PH] (ref 5–8)
PLATELET # BLD AUTO: 184 10*3/MM3 (ref 140–450)
PMV BLD AUTO: 11 FL (ref 6–12)
POTASSIUM SERPL-SCNC: 5 MMOL/L (ref 3.5–5.2)
PROT SERPL-MCNC: 6.8 G/DL (ref 6–8.5)
PROT UR QL STRIP: NEGATIVE
RBC # BLD AUTO: 3.55 10*6/MM3 (ref 3.77–5.28)
SODIUM SERPL-SCNC: 134 MMOL/L (ref 136–145)
SP GR UR STRIP: 1.01 (ref 1–1.03)
UROBILINOGEN UR QL STRIP: NORMAL
WBC NRBC COR # BLD: 8.89 10*3/MM3 (ref 3.4–10.8)
WHOLE BLOOD HOLD COAG: NORMAL
WHOLE BLOOD HOLD SPECIMEN: NORMAL

## 2023-06-01 PROCEDURE — 99283 EMERGENCY DEPT VISIT LOW MDM: CPT

## 2023-06-01 PROCEDURE — 74176 CT ABD & PELVIS W/O CONTRAST: CPT

## 2023-06-01 PROCEDURE — 83605 ASSAY OF LACTIC ACID: CPT | Performed by: EMERGENCY MEDICINE

## 2023-06-01 PROCEDURE — 83690 ASSAY OF LIPASE: CPT | Performed by: EMERGENCY MEDICINE

## 2023-06-01 PROCEDURE — 36415 COLL VENOUS BLD VENIPUNCTURE: CPT | Performed by: EMERGENCY MEDICINE

## 2023-06-01 PROCEDURE — 81003 URINALYSIS AUTO W/O SCOPE: CPT | Performed by: EMERGENCY MEDICINE

## 2023-06-01 PROCEDURE — 96360 HYDRATION IV INFUSION INIT: CPT

## 2023-06-01 PROCEDURE — 80053 COMPREHEN METABOLIC PANEL: CPT | Performed by: EMERGENCY MEDICINE

## 2023-06-01 PROCEDURE — 85025 COMPLETE CBC W/AUTO DIFF WBC: CPT | Performed by: EMERGENCY MEDICINE

## 2023-06-01 PROCEDURE — 36415 COLL VENOUS BLD VENIPUNCTURE: CPT

## 2023-06-01 RX ORDER — SODIUM CHLORIDE 0.9 % (FLUSH) 0.9 %
10 SYRINGE (ML) INJECTION AS NEEDED
Status: DISCONTINUED | OUTPATIENT
Start: 2023-06-01 | End: 2023-06-01 | Stop reason: HOSPADM

## 2023-06-01 RX ADMIN — SODIUM CHLORIDE 1000 ML: 9 INJECTION, SOLUTION INTRAVENOUS at 15:03

## 2023-06-01 NOTE — TELEPHONE ENCOUNTER
Caller: PADMA (POA)ANEUDY    Relationship: Emergency Contact    Best call back number: [phone]    What is the best time to reach you: ANY    Who are you requesting to speak with (clinical staff, provider,  specific staff member): DR CABALLERO    What was the call regarding: ANEUDY STATED THAT SHE IS NEEDING O DISCUSS NEED TO KEEP THE 6/2/23 AT 8:45  APPOINTMENT THAT IS SCHEDULED OR TO HAVE THIS CANCELED. PATIENT IS BEING DISCHARGED FROM [facility] ER THIS AFTERNOON AND WANTING TO DISCUSS NEXT STEPS AND IF THE 6/2/23 APPOINTMENT IS TO BE KEPT OR NOT?  ARE THERE GOING TO BE REFERRALS?    Is it okay if the provider responds through [employer]: YES             14fr, 15cm length

## 2023-06-01 NOTE — ED PROVIDER NOTES
Time: 12:11 PM EDT  Date of encounter:  6/1/2023  Independent Historian/Clinical History and Information was obtained by:   Patient and Family  Chief Complaint: Abdominal pain and Diarrhea    History is limited by: Dementia    History of Present Illness:  Patient is a 77 y.o. year old adult who presents to the emergency department for evaluation of abdominal pain and diarrhea since Sunday. Daughter states assistant living has called her since Sunday to update her about her mother's condition. Pt was seen 5/8/23 here and was diagnosed with a small hiatal hernia, had f/u with general surgery and confirm that it could not be surgically repaired. Pt points to LLQ when asked where the pain is but daughter states it starts in the LUQ. Pt has generalized back pain so daughter is unsure if he pain radiates to the back. Pt states the pain has been described as sharp in nature, comes and goes. Pt states assisted living states that food could possibly be a trigger to the abdominal pain. Denies fever, vomiting, nausea.  Denies chest pain, sob. Denies burn or pain with urination. Daughter states pt has loss of appetite since Sunday as well.     HPI    Patient Care Team  Primary Care Provider: Ej Santoyo MD    Past Medical History:     Allergies   Allergen Reactions   • Sulfa Antibiotics Itching     Past Medical History:   Diagnosis Date   • Anemia    • Arthritis    • Back pain with radiation 01/24/2013   • Breast lump    • Cancer    • Cancer of right breast 02/2012    had lumpectomy, undergoing treatment at Dr. Campoverde's every Tuesday for 6 months, then radiation   • Difficulty walking    • Foot pain, right    • Hemorrhoids    • Hypertension 03/2023   • Hypothyroidism 01/24/2013   • IC (interstitial cystitis)    • Ingrowing toenail    • Microscopic hematuria    • Mood disorder    • Plantar fasciitis    • PONV (postoperative nausea and vomiting)     possibly   • Rectal bleeding    • Vitamin D deficiency      Past Surgical  History:   Procedure Laterality Date   • APPENDECTOMY     • BREAST BIOPSY      right side   • BREAST LUMPECTOMY Bilateral    • BREAST SURGERY     • COLONOSCOPY     • CYST REMOVAL  1957    removed form coccyx   • CYSTOSCOPY     • EYE SURGERY      lasix   • HARDWARE REMOVAL     • PORTACATH PLACEMENT     • TOENAIL EXCISION     • TONSILLECTOMY       Family History   Problem Relation Age of Onset   • Lung cancer Mother    • Brain cancer Mother    • Diabetes Mother         due to Graves Disease   • Throat cancer Mother    • Arthritis Mother    • Cancer Mother         Lung Cancer   • Thyroid disease Mother         Graves Disease   • Heart disease Father    • Breast cancer Father         40s   • Arthritis Father    • Cancer Father         Breast Cancer   • Other Other         heart surgery in 2005   • Alzheimer's disease Other    • Graves' disease Other    • Diabetes Maternal Aunt         unspecified type   • Diabetes Maternal Uncle         unspecified type   • Kidney nephrosis Maternal Uncle        Home Medications:  Prior to Admission medications    Medication Sig Start Date End Date Taking? Authorizing Provider   ammonium lactate (AmLactin) 12 % lotion Apply  topically to the appropriate area as directed 2 (Two) Times a Day. 2/22/23   Henry Sifuentes DPM   furosemide (LASIX) 40 MG tablet Take 1 tablet by mouth 2 (Two) Times a Day As Needed (swelling). 3/31/23   Ej Santoyo MD   gabapentin (NEURONTIN) 100 MG capsule TAKE 1 CAPSULE BY MOUTH TWICE DAILY 5/22/23   Ej Santoyo MD   levothyroxine (Synthroid) 112 MCG tablet Take 1 tablet by mouth Daily. 2/20/23   Ej Santoyo MD   lisinopril (PRINIVIL,ZESTRIL) 10 MG tablet Take 1 tablet by mouth Daily. 3/31/23   Ej Santoyo MD   methocarbamol (ROBAXIN) 500 MG tablet Take 1 tablet by mouth 2 (Two) Times a Day As Needed for Muscle Spasms. 5/30/23   Ej Santoyo MD   omeprazole (priLOSEC) 40 MG capsule TAKE 1 CAPSULE BY MOUTH DAILY 5/18/23   Ej Santoyo MD    pantoprazole (PROTONIX) 40 MG EC tablet  23   Provider, MD Alicia   QUEtiapine (SEROquel) 25 MG tablet TAKE 1 TABLET BY MOUTH EVERY NIGHT 23   Ej Santoyo MD   spironolactone (ALDACTONE) 25 MG tablet TAKE 1 TABLET BY MOUTH DAILY AS NEEDED FOR SWELLING 23   Ej Santoyo MD        Social History:   Social History     Tobacco Use   • Smoking status: Former     Years: 60.00     Types: Cigarettes     Quit date: 2022     Years since quittin.9   • Smokeless tobacco: Never   • Tobacco comments:     quit at age 59, second hand smoke exposure- never   Vaping Use   • Vaping Use: Never used   Substance Use Topics   • Alcohol use: Not Currently     Comment: light   • Drug use: Never         Review of Systems:  Review of Systems   Constitutional: Positive for appetite change. Negative for fever.   Respiratory: Negative for shortness of breath.    Cardiovascular: Negative for chest pain.   Gastrointestinal: Positive for diarrhea. Negative for nausea and vomiting.   Genitourinary: Negative for difficulty urinating and dysuria.   Musculoskeletal: Positive for back pain.   Psychiatric/Behavioral: Positive for confusion.        Physical Exam:  /50   Pulse 68   Temp 97.7 °F (36.5 °C)   Resp 14   SpO2 100%     Physical Exam  HENT:      Head: Normocephalic.   Eyes:      Extraocular Movements: Extraocular movements intact.      Conjunctiva/sclera: Conjunctivae normal.      Pupils: Pupils are equal, round, and reactive to light.   Cardiovascular:      Rate and Rhythm: Normal rate and regular rhythm.      Pulses: Normal pulses.      Heart sounds: Normal heart sounds.   Pulmonary:      Effort: Pulmonary effort is normal.      Breath sounds: Normal breath sounds.   Abdominal:      General: Abdomen is flat. Bowel sounds are normal.      Palpations: Abdomen is soft.      Tenderness: There is abdominal tenderness (epigastric region tenderness).   Musculoskeletal:         General: Normal range of motion.       Cervical back: Normal range of motion.   Skin:     General: Skin is warm.   Neurological:      Mental Status: Sue Simmons is alert. Mental status is at baseline.   Psychiatric:         Mood and Affect: Mood normal.                  Procedures:  Procedures      Medical Decision Making:      Comorbidities that affect care:    Anemia, Hypertension, Breast Cancer    External Notes reviewed:    Previous Clinic Note: on 2/16/23 for tinea unguium from podiatry.      The following orders were placed and all results were independently analyzed by me:  Orders Placed This Encounter   Procedures   • CT Abdomen Pelvis Without Contrast   • [place] Draw   • Comprehensive Metabolic Panel   • Lipase   • Urinalysis With Microscopic If Indicated (No Culture) - Urine, Clean Catch   • Lactic Acid, Plasma   • CBC Auto Differential   • Undress & Gown   • CBC & Differential   • Green Top (Gel)   • Lavender Top   • Gold Top - SST   • Light Blue Top       Medications Given in the Emergency Department:  Medications   sodium chloride 0.9 % bolus 1,000 mL (0 mL Intravenous Stopped 6/1/23 1634)        ED Course:    ED Course as of 06/01/23 2315   Thu Jun 01, 2023   1456 Chloride: 98 [RP]      ED Course User Index  [RP] Jono Vega MD       Labs:    Lab Results (last 24 hours)     Procedure Component Value Units Date/Time    CBC & Differential [[id number]]  (Abnormal) Collected: 06/01/23 1213    Specimen: Blood Updated: 06/01/23 1222    Narrative:      The following orders were created for panel order CBC & Differential.  Procedure                               Abnormality         Status                     ---------                               -----------         ------                     CBC Auto Differential[[id number]]        Abnormal            Final result                 Please view results for these tests on the individual orders.    Comprehensive Metabolic Panel [[id number]]  (Abnormal) Collected: 06/01/23 1213     Specimen: Blood Updated: 06/01/23 1254     Glucose 109 mg/dL      BUN 51 mg/dL      Creatinine 1.74 mg/dL      Sodium 134 mmol/L      Potassium 5.0 mmol/L      Chloride 98 mmol/L      CO2 24.7 mmol/L      Calcium 9.4 mg/dL      Total Protein 6.8 g/dL      Albumin 3.9 g/dL      ALT (SGPT) 13 U/L      AST (SGOT) 12 U/L      Alkaline Phosphatase 56 U/L      Total Bilirubin 0.4 mg/dL      Globulin 2.9 gm/dL      A/G Ratio 1.3 g/dL      BUN/Creatinine Ratio 29.3     Anion Gap 11.3 mmol/L      eGFR 29.7 mL/min/1.73     Narrative:      GFR Normal >60  Chronic Kidney Disease <60  Kidney Failure <15    The GFR formula is only valid for adults with stable renal function between ages 18 and 70.    Lipase [[id number]]  (Normal) Collected: 06/01/23 1213    Specimen: Blood Updated: 06/01/23 1254     Lipase 36 U/L     Lactic Acid, Plasma [[id number]]  (Normal) Collected: 06/01/23 1213    Specimen: Blood Updated: 06/01/23 1249     Lactate 1.0 mmol/L     CBC Auto Differential [[id number]]  (Abnormal) Collected: 06/01/23 1213    Specimen: Blood Updated: 06/01/23 1222     WBC 8.89 10*3/mm3      RBC 3.55 10*6/mm3      Hemoglobin 11.1 g/dL      Hematocrit 33.4 %      MCV 94.1 fL      MCH 31.3 pg      MCHC 33.2 g/dL      RDW 14.8 %      RDW-SD 51.8 fl      MPV 11.0 fL      Platelets 184 10*3/mm3      Neutrophil % 71.6 %      Lymphocyte % 18.7 %      Monocyte % 8.1 %      Eosinophil % 0.9 %      Basophil % 0.3 %      Immature Grans % 0.4 %      Neutrophils, Absolute 6.36 10*3/mm3      Lymphocytes, Absolute 1.66 10*3/mm3      Monocytes, Absolute 0.72 10*3/mm3      Eosinophils, Absolute 0.08 10*3/mm3      Basophils, Absolute 0.03 10*3/mm3      Immature Grans, Absolute 0.04 10*3/mm3      nRBC 0.0 /100 WBC     Urinalysis With Microscopic If Indicated (No Culture) - Urine, Clean Catch [[id number]]  (Normal) Collected: 06/01/23 1259    Specimen: Urine, Clean Catch Updated: 06/01/23 1309     Color, UA Yellow     Appearance, UA Clear     pH, UA 5.5      Specific Gravity, UA 1.010     Glucose, UA Negative     Ketones, UA Negative     Bilirubin, UA Negative     Blood, UA Negative     Protein, UA Negative     Leuk Esterase, UA Negative     Nitrite, UA Negative     Urobilinogen, UA 0.2 E.U./dL    Narrative:      Urine microscopic not indicated.           Imaging:    CT Abdomen Pelvis Without Contrast    Result Date: 6/1/2023  PROCEDURE: CT ABDOMEN PELVIS WO CONTRAST  COMPARISON: [facility], CT, CTA ABDOMEN W/WO CONTRAST, 6/08/2018, 12:50.  [facility], CT, CHEST W/ CONTRAST, 9/26/2017, 13:01.  [facility], CT, CT ABDOMEN PELVIS WO CONTRAST, 4/19/2023, 17:26.  INDICATIONS: left flank pain  PROTOCOL:   Standard imaging protocol performed    RADIATION:   DLP: 804.6 mGy*cm   Automated exposure control was utilized to minimize radiation dose.  TECHNIQUE: Axial images of the abdomen and pelvis without intravenous or oral contrast.  FINDINGS:  ABDOMEN: There are stable faint benign nodules in the lung bases.  The unenhanced liver, spleen, pancreas, adrenal glands and kidneys are normal.  There are no inflammatory changes around the gallbladder.  No renal calcifications are identified.  There is a small hiatal hernia.  PELVIS: No ureteral or urinary bladder calcifications are identified.  No evidence of bowel obstruction, perforation or abscess.  Colonic diverticulosis is present.  No CT evidence of colitis or diverticulitis.  The abdominal aorta has a normal caliber.  Atherosclerotic disease is present.  There are degenerative changes in the hips and lumbar spine.  There is a 3 cm leiomyoma in the uterine fundus.  Stable sacral nerve root sheath cyst.  IMPRESSION:  No acute findings.   RIMMA HADLEY MD       Electronically Signed and Approved By: RIMMA HADLEY MD on 6/01/2023 at 13:36                 Differential Diagnosis and Discussion:    Abdominal Pain: Based on the patient's signs and symptoms, I considered  abdominal aortic aneurysm, small bowel obstruction, pancreatitis, acute cholecystitis, acute appendecitis, peptic ulcer disease, gastritis, colitis, endocrine disorders, irritable bowel syndrome and other differential diagnosis an etiology of the patient's abdominal pain.    All labs were reviewed and interpreted by me.  CT scan radiology impression was interpreted by me.    MDM         Patient Care Considerations:          Consultants/Shared Management Plan:    None    Social Determinants of Health:    Patient has presented with family members who are responsible, reliable and will ensure follow up care.      Disposition and Care Coordination:    Discharged: The patient is suitable and stable for discharge with no need for consideration of observation or admission.    I have explained the patient´s condition, diagnoses and treatment plan based on the information available to me at this time. I have answered questions and addressed any concerns. The patient has a good  understanding of the patient´s diagnosis, condition, and treatment plan as can be expected at this point. The vital signs have been stable. The patient´s condition is stable and appropriate for discharge from the emergency department.      The patient will pursue further outpatient evaluation with the primary care physician or other designated or consulting physician as outlined in the discharge instructions. They are agreeable to this plan of care and follow-up instructions have been explained in detail. The patient has received these instructions in written format and have expressed an understanding of the discharge instructions. The patient is aware that any significant change in condition or worsening of symptoms should prompt an immediate return to this or the closest emergency department or call to 911.    Final diagnoses:   Diarrhea, unspecified type   Dehydration        ED Disposition     ED Disposition   Discharge    Condition   Stable     Comment   --             This medical record created using voice recognition software.             Jono Vega MD  06/01/23 9437       Jono Vega MD  06/01/23 1349

## 2023-06-01 NOTE — DISCHARGE INSTRUCTIONS
Stay well-hydrated.  Return to the ER immediately for worsening of symptoms, fever.  Focus on drinking water and sports drinks/electrolyte drinks.

## 2023-06-02 ENCOUNTER — OFFICE VISIT (OUTPATIENT)
Dept: FAMILY MEDICINE CLINIC | Facility: CLINIC | Age: 78
End: 2023-06-02

## 2023-06-02 VITALS
TEMPERATURE: 97.5 F | RESPIRATION RATE: 16 BRPM | DIASTOLIC BLOOD PRESSURE: 74 MMHG | BODY MASS INDEX: 34.89 KG/M2 | WEIGHT: 230.2 LBS | HEIGHT: 68 IN | SYSTOLIC BLOOD PRESSURE: 122 MMHG

## 2023-06-02 DIAGNOSIS — K21.9 GASTROESOPHAGEAL REFLUX DISEASE WITHOUT ESOPHAGITIS: ICD-10-CM

## 2023-06-02 DIAGNOSIS — R10.12 LEFT UPPER QUADRANT ABDOMINAL PAIN: Primary | ICD-10-CM

## 2023-06-02 DIAGNOSIS — N18.32 STAGE 3B CHRONIC KIDNEY DISEASE: ICD-10-CM

## 2023-06-02 DIAGNOSIS — Z79.899 MEDICATION MANAGEMENT: ICD-10-CM

## 2023-06-02 PROCEDURE — 99214 OFFICE O/P EST MOD 30 MIN: CPT | Performed by: STUDENT IN AN ORGANIZED HEALTH CARE EDUCATION/TRAINING PROGRAM

## 2023-06-02 PROCEDURE — 1160F RVW MEDS BY RX/DR IN RCRD: CPT | Performed by: STUDENT IN AN ORGANIZED HEALTH CARE EDUCATION/TRAINING PROGRAM

## 2023-06-02 PROCEDURE — 80305 DRUG TEST PRSMV DIR OPT OBS: CPT | Performed by: STUDENT IN AN ORGANIZED HEALTH CARE EDUCATION/TRAINING PROGRAM

## 2023-06-02 PROCEDURE — 1159F MED LIST DOCD IN RCRD: CPT | Performed by: STUDENT IN AN ORGANIZED HEALTH CARE EDUCATION/TRAINING PROGRAM

## 2023-06-02 RX ORDER — LEVOTHYROXINE SODIUM 0.1 MG/1
TABLET ORAL
COMMUNITY
Start: 2023-05-08

## 2023-06-02 RX ORDER — GABAPENTIN 100 MG/1
100 CAPSULE ORAL
COMMUNITY
Start: 2023-05-22

## 2023-06-02 RX ORDER — FAMOTIDINE 40 MG/1
40 TABLET, FILM COATED ORAL DAILY
Qty: 90 TABLET | Refills: 1 | Status: SHIPPED | OUTPATIENT
Start: 2023-06-02

## 2023-06-02 RX ORDER — TRAMADOL HYDROCHLORIDE 50 MG/1
50 TABLET ORAL EVERY 8 HOURS PRN
Qty: 60 TABLET | Refills: 2 | Status: SHIPPED | OUTPATIENT
Start: 2023-06-02

## 2023-06-02 NOTE — PROGRESS NOTES
"Chief Complaint  Left upper quadrant abdominal pain   Subjective         Sue Simmons is a 77 y.o. adult who presents to [facility]    77 years old comes to the clinic today for an acute visit and follow-up.    Patient is complaining of last few months of intermittent left upper abdominal pain, 8 out of 10, nonradiating, not associated with any nausea/vomiting.  Tolerating p.o. intake without any difficulties, denies any fever/constipation but does report intermittent diarrhea which has been going on for long long time.  Patient lives in assisted living facility, Tylenol does help with pain.  Patient does report mild improvement with pain after starting acid reflux medication.    Patient was seen in the ER 2 times for this pain with normal CT scan x2 and blood work.    Patient has some stool study pending, and has appointment with GI for further evaluation.    Recent blood work showed mild worsening of CKD    Denies any chest pain or shortness of breath    Objective   Vital Signs:   Vitals:    06/02/23 0847   BP: 122/74   BP Location: Left arm   Patient Position: Sitting   Cuff Size: Large Adult   Resp: 16   Temp: 97.5 °F (36.4 °C)   TempSrc: Temporal   Weight: 104 kg (230 lb 3.2 oz)   Height: 172.7 cm (68\")      Body mass index is 35 kg/m².   Wt Readings from Last 3 Encounters:   06/02/23 104 kg (230 lb 3.2 oz)   05/08/23 103 kg (228 lb)   05/02/23 107 kg (235 lb)      BP Readings from Last 3 Encounters:   06/02/23 122/74   06/01/23 122/50   04/21/23 124/72        Patient Care Team:  Ej Santoyo MD as PCP - General (Family Medicine)     Physical Exam  Vitals reviewed.   Constitutional:       Appearance: Normal appearance. Sue Simmons is well-developed.   HENT:      Head: Normocephalic and atraumatic.      Right Ear: External ear normal.      Left Ear: External ear normal.      Mouth/Throat:      Pharynx: No oropharyngeal exudate.   Eyes:      Conjunctiva/sclera: Conjunctivae " normal.      Pupils: Pupils are equal, round, and reactive to light.   Cardiovascular:      Rate and Rhythm: Normal rate and regular rhythm.      Heart sounds: No murmur heard.    No friction rub. No gallop.   Pulmonary:      Effort: Pulmonary effort is normal.      Breath sounds: Normal breath sounds. No wheezing or rhonchi.   Abdominal:      General: Bowel sounds are normal. There is no distension.      Palpations: Abdomen is soft.      Tenderness: There is no abdominal tenderness.   Skin:     General: Skin is warm and dry.   Neurological:      Mental Status: Sue Simmons is alert and oriented to person, place, and time.      Cranial Nerves: No cranial nerve deficit.   Psychiatric:         Mood and Affect: Mood and affect normal.         Behavior: Behavior normal.         Thought Content: Thought content normal.         Judgment: Judgment normal.                       Assessment and Plan   Diagnoses and all orders for this visit:    1. Left upper quadrant abdominal pain (Primary)  Comments:  Pending GI.  Will prescribe tramadol, normal findings so far/pending stool study.  Strict ER precautions discussed, diet modifications discussed  Orders:  -     traMADol (ULTRAM) 50 MG tablet; Take 1 tablet by mouth Every 8 (Eight) Hours As Needed for Moderate Pain.  Dispense: 60 tablet; Refill: 2    2. Stage 3b chronic kidney disease  Comments:  Nephrology consult, patient to avoid all the ibuprofen/NSAIDs  Orders:  -     Ambulatory Referral to Nephrology    3. Gastroesophageal reflux disease without esophagitis  Comments:  Due to kidney function, patient to start Pepcid and start Protonix  Orders:  -     famotidine (Pepcid) 40 MG tablet; Take 1 tablet by mouth Daily.  Dispense: 90 tablet; Refill: 1    4. Medication management  -     traMADol (ULTRAM) 50 MG tablet; Take 1 tablet by mouth Every 8 (Eight) Hours As Needed for Moderate Pain.  Dispense: 60 tablet; Refill: 2  -     POC Urine Drug Screen [facility]  [facility]        Recent ER visits and blood work/CT scans reviewed  Tobacco Use: Medium Risk   • Smoking Tobacco Use: Former   • Smokeless Tobacco Use: Never   • Passive Exposure: Not on file            Follow Up   Return if symptoms worsen or fail to improve.  Patient was given instructions and counseling regarding Suereza Simmons's condition or for health maintenance advice. Please see specific information pulled into the AVS if appropriate.

## 2023-06-04 DIAGNOSIS — I10 PRIMARY HYPERTENSION: ICD-10-CM

## 2023-06-05 DIAGNOSIS — I10 PRIMARY HYPERTENSION: ICD-10-CM

## 2023-06-05 RX ORDER — LISINOPRIL 10 MG/1
10 TABLET ORAL DAILY
Qty: 90 TABLET | OUTPATIENT
Start: 2023-06-05

## 2023-06-05 RX ORDER — LISINOPRIL 10 MG/1
10 TABLET ORAL DAILY
Qty: 30 TABLET | Refills: 1 | Status: SHIPPED | OUTPATIENT
Start: 2023-06-05

## 2023-06-12 ENCOUNTER — HOSPITAL ENCOUNTER (OUTPATIENT)
Dept: OCCUPATIONAL THERAPY | Facility: HOSPITAL | Age: 78
Setting detail: THERAPIES SERIES
Discharge: HOME | End: 2023-06-12
Payer: MEDICARE

## 2023-06-12 DIAGNOSIS — N64.89 DEFORMITY DUE TO MASTECTOMY: Primary | Chronic | ICD-10-CM

## 2023-06-12 DIAGNOSIS — Z90.10 DEFORMITY DUE TO MASTECTOMY: Primary | Chronic | ICD-10-CM

## 2023-06-12 DIAGNOSIS — Z44.9 FITTING AND ADJUSTMENT OF UNSPECIFIED PROSTHETIC DEVICE: Chronic | ICD-10-CM

## 2023-06-12 DIAGNOSIS — C50.411 MALIGNANT NEOPLASM OF UPPER-OUTER QUADRANT OF RIGHT BREAST IN FEMALE, ESTROGEN RECEPTOR POSITIVE: Chronic | ICD-10-CM

## 2023-06-12 DIAGNOSIS — Z17.0 MALIGNANT NEOPLASM OF UPPER-OUTER QUADRANT OF RIGHT BREAST IN FEMALE, ESTROGEN RECEPTOR POSITIVE: Chronic | ICD-10-CM

## 2023-06-12 PROCEDURE — L8030 BREAST PROSTHES W/O ADHESIVE: HCPCS | Performed by: OCCUPATIONAL THERAPIST

## 2023-06-12 PROCEDURE — L8000 MASTECTOMY BRA: HCPCS | Performed by: OCCUPATIONAL THERAPIST

## 2023-06-12 PROCEDURE — 97165 OT EVAL LOW COMPLEX 30 MIN: CPT | Performed by: OCCUPATIONAL THERAPIST

## 2023-06-12 NOTE — THERAPY EVALUATION
Outpatient Occupational Therapy Lymphedema Initial Evaluation  DELGADO Cox     Patient Name: Sue Simmons  : 1945  MRN: [id number]  Today's Date: 2023      Visit Date: 2023    Patient Active Problem List   Diagnosis    Acquired hypothyroidism    Chronic left hip pain    Stage 3b chronic kidney disease    History of breast cancer    Anemia    Arthritis    Back pain with radiation    Breast lump    Cancer    Cancer of right breast    Family history of coronary artery disease    Family history of lung cancer    Family history of thyroid disease    Foot pain, right    Hematuria, microscopic    Hemorrhoids    IC (interstitial cystitis)    Ingrown toenail    Mood disorder    Rectal bleeding    Varicose veins of lower extremities with inflammation    Venous insufficiency (chronic) (peripheral)    Vitamin D deficiency        Past Medical History:   Diagnosis Date    Anemia     Arthritis     Back pain with radiation 2013    Breast lump     Cancer     Cancer of right breast 2012    had lumpectomy, undergoing treatment at Dr. Campoverde's every Tuesday for 6 months, then radiation    Difficulty walking     Foot pain, right     Hemorrhoids     Hypertension 2023    Hypothyroidism 2013    IC (interstitial cystitis)     Ingrowing toenail     Microscopic hematuria     Mood disorder     Plantar fasciitis     PONV (postoperative nausea and vomiting)     possibly    Rectal bleeding     Vitamin D deficiency         Past Surgical History:   Procedure Laterality Date    APPENDECTOMY      BREAST BIOPSY      right side    BREAST LUMPECTOMY Bilateral     BREAST SURGERY      COLONOSCOPY      CYST REMOVAL      removed form coccyx    CYSTOSCOPY      EYE SURGERY      lasix    HARDWARE REMOVAL      PORTACATH PLACEMENT      TOENAIL EXCISION      TONSILLECTOMY           Visit Dx:     ICD-10-CM ICD-9-CM   1. Deformity due to mastectomy  N64.89 611.89    Z90.10    2. Fitting and adjustment of unspecified  prosthetic device  Z44.9 V52.9   3. Malignant neoplasm of upper-outer quadrant of right breast in female, estrogen receptor positive  C50.411 174.4    Z17.0 V86.0        Patient History       Row Name 06/12/23 1200             History    Brief Description of Current Complaint Patient has right breast cancer in 2018 that resulted in lumpectomy that caused a great amount of tissue to be removed from the right breast.  -TD         Fall Risk Assessment    Any falls in the past year: Yes  -TD      Does patient have a fear of falling Yes (comment)  -TD         Services    Are you currently receiving Home Health services No  -TD      Do you plan to receive Home Health services in the near future No  -TD         Daily Activities    Primary Language English  -TD      Are you able to read Yes  -TD      Are you able to write Yes  -TD      How does patient learn best? Listening;Reading;Demonstration;Pictures/Video  -TD         Safety    Are you being hurt, hit, or frightened by anyone at home or in your life? No  -TD      Are you being neglected by a caregiver No  -TD      Have you had any of the following issues with N/A  -TD                User Key  (r) = Recorded By, (t) = Taken By, (c) = Cosigned By      Initials Name Provider Type    TD Gabby Pena OT Occupational Therapist                         OT Ortho       Row Name 06/12/23 1200             Orthotics & Prosthetics Management    Orthosis Location other (see comments)  Breast prosthesis and post mastectomy orthosis.  -TD      Additional Documentation Orthosis Location (Row)  -TD                User Key  (r) = Recorded By, (t) = Taken By, (c) = Cosigned By      Initials Name Provider Type    Gabby Engel OT Occupational Therapist                  OT Mastectomy Care:   Location: Right chest wall    Type of Prosthetic:  Silicone breast form    Reason for Visit/Customer Goal:  Patient would like to achieve symmetry and balance in appearance to improve orthopedic  balance as well as improve psychological impact when engaging in community and social activities.    Reason for Prosthetic: Prevent Deformities    Date of Surgery: 2018    Date of Discharge: 2018    Wearing Schedule: As Tolerated    Prosthetic Site Condition: Intact    Tolerance: Tolerates Well    Product :  Harpreet    Product Name and Model Number:   (Issued: 6/12/2023)  402 Natura light 1Sn size: 5    Garments  Type of Garment Dispensed: Mast Bra w/o Breast Form    Breast : Harpreet    Product Name:   3- Elaine white 46D  1- Elaine nude 46D     Number of Garments Dispensed: 4       Therapy Education  Education Details: Pt. was fitted with a natura light 1sn 402 size 5 silicone breast prosthesis. See below.  Fit and size are appropriate with no gapping, pinching, or puckering observed.  Pt. states comfort and satisfaction with both bra's selected and with prosthesis.  All devices were checked for quality and safety.  Pt. was educated on the benefits, precautions warranty, care and maintenance for her prosthesis and bras.  Educational handout was provided in the prosthesis box.  Given: Symptoms/condition management  Program: New  How Provided: Verbal  Provided to: Patient, Caregiver  Level of Understanding: Verbalized         OT Goals       Row Name 06/12/23 1421          Time Calculation    OT Goal Re-Cert Due Date 07/12/23  -TD               User Key  (r) = Recorded By, (t) = Taken By, (c) = Cosigned By      Initials Name Provider Type    Gabby Engel OT Occupational Therapist                  1. Encounter for Breast Prosthetic and mastectomy bra fitting:  LTG 1:  90 days: To provide balance and symmetry for performance of daily activity, the patient will participate in breast prosthesis and mastectomy bra fitting procedure.   STATUS: New  STG 1a: 30 days:  Patient will participate in mastectomy bra fit re-evaluation to ensure proper fit for balance and symmetry for improved  postural alignment/lymph fluid dynamics to prevent impairment in activities of daily living.   STATUS: New  STG 1b: 30 days:  Patient will participate in breast prosthesis fit re-evaluation 2 years after initial distribution to ensure proper fit for balance and symmetry for improved postural alignment/lymph fluid dynamics to prevent impairment in activities of daily living.  STATUS: New  TREATMENT: Orthotic/Prosthetic Management and Training, Amoena  Silicone Breast Prosthetic, Mastectomy Bra initial fitting and month re-fitting, ADL/Self Care, Therapeutic Activity, Therapeutic Exercise, and Manual Therapy.      OT Assessment/Plan       Row Name 06/12/23 1417 06/12/23 1246       OT Assessment    Functional Limitations -- Decreased safety during functional activities  -TD    Assessment Comments Pt presents with decreased balance and symmetry from breast resection that impacts orthopedic balance and symmetry.  Patient will benefit from continued evaluation and of integrity of the silicone breast form as well as the mastectomy bras to ensure proper fit for symmetry and balance of breast prosthesis to reduce orthopedic and lymphatic impairment. The skills of a therapist will be required to safely and effectively implement the following treatment plan to restore maximal level of function.  -TD --    OT Diagnosis deformity due to mastectomy  -TD --    OT Rehab Potential Good  -TD --    Patient/caregiver participated in establishment of treatment plan and goals Yes  -TD --    Patient would benefit from skilled therapy intervention Yes  -TD --       OT Plan    OT Frequency --  see duration  -TD --    Predicted Duration of Therapy Intervention (OT) Pt will be seen every 3-4 months for bras re fills and every 2 years for breast prothetics.  -TD --    Planned CPT's? OT EVAL LOW COMPLEXITY: 75953;OT EVAL MOD COMPLEXITY: 45167;OT EVAL HIGH COMPLEXITY: 46081;OT RE-EVAL: 89942;OT THER PROC EA 15 MIN: 28747OP;OT THER ACT EA 15 MIN:  55416RI;OT MANUAL THERAPY EA 15 MIN: 19405;OT ORTHO/PROSTHET CHECKOUT EA 15 MIN: 84717;OT ORTHOTIC MGMT/TRAIN EA 15 MIN: 21850  -TD --    Planned Therapy Interventions (Optional Details) manual therapy techniques;prosthetic fitting/training;ROM (Range of Motion);patient/family education;orthotic fitting/training  -TD --    OT Plan Comments continue POC  -TD --              User Key  (r) = Recorded By, (t) = Taken By, (c) = Cosigned By      Initials Name Provider Type    TD Gabby Pena OT Occupational Therapist                  OT eval complexity:   Examination:   Performance Deficits include:  dressing, bathing, grooming   # deficits affecting performance:  3  Decision Making:   Treatment Options Considered : adaption, remediation, prevention  # Treatment options considered : 3  Modification Made for: environment, task   Level of Task Modification: min/mod  Comorbidity Affect Performance: none  How is performance affected: n/a  Evaluation Level Determined:  low             Time Calculation:   Untimed Charges  OT Eval/Re-eval Minutes: 90  Total Minutes  Untimed Charges Total Minutes: 90   Total Minutes: 90     Therapy Charges for Today       Code Description Service Date Service Provider Modifiers Qty    16354609965 HC-OT EVAL LOW COMPLEXITY 5 6/12/2023 Gabby Pena OT  1    [id number]  BRA POST/SURG NO/FORM PACHECO/DESTINY/NELLIE/ADELITA/ANNAMARIA 6/12/2023 Gabby Pena OT  4    [id number]  BREAST PROSTHESIS WO ADHS NATURA AMOENA 6/12/2023 Gabby Pena OT  1                      Gabby Pena OT  6/12/2023

## 2023-06-27 PROBLEM — M17.11 PRIMARY OSTEOARTHRITIS OF RIGHT KNEE: Status: ACTIVE | Noted: 2023-06-27

## 2023-06-27 PROBLEM — M70.61 TROCHANTERIC BURSITIS OF RIGHT HIP: Status: ACTIVE | Noted: 2023-06-27

## 2023-06-27 PROBLEM — M25.551 RIGHT HIP PAIN: Status: ACTIVE | Noted: 2022-02-15

## 2023-06-27 PROBLEM — M25.561 RIGHT KNEE PAIN: Status: ACTIVE | Noted: 2023-06-27

## 2023-06-27 PROBLEM — M16.0 OSTEOARTHRITIS OF BOTH HIPS: Status: ACTIVE | Noted: 2023-06-27

## 2023-06-28 PROBLEM — N17.9 ACUTE RENAL FAILURE, UNSPECIFIED ACUTE RENAL FAILURE TYPE: Status: ACTIVE | Noted: 2023-06-28

## 2023-07-28 ENCOUNTER — TELEPHONE (OUTPATIENT)
Dept: FAMILY MEDICINE CLINIC | Facility: CLINIC | Age: 78
End: 2023-07-28

## 2023-07-28 NOTE — TELEPHONE ENCOUNTER
Caller: PADMA (ANEUDY ROGERS    Relationship to patient: Emergency Contact    Best call back number: [phone]     Patient is needing: PATIENT'S DAUGHTER (POA) STATING SHE IS HAVING CAR TROUBLE AND WONT BE ABLE TO MAKE IT IN TODAY FOR THE PATIENT'S HOSPITAL FOLLOW UP.     PATIENT'S DAUGHTER STATES THAT SHE IS NOT SURE A FOLLOW UP IS NEEDED AT THIS TIME DUE TO THE PATIENT IS ALREADY GOING FOR HER 2ND APPOINTMENT WITH THE UROLOGIST THIS WEEK COMING AND THEY ARE TAKING CARE OF HER. PATIENT'S DAUGHTER ALSO SAID THE PATIENT IS SEEING HER KIDNEY DOCTORS AS WELL.     PATIENT'S DAUGHTER WILL BRING THE PATIENT IN IF IT IS NEEDED BUT SAYS THERE IS AN APPOINTMENT WITH DR CABALLERO ON 8.23.23 COMING UP.     CALL IF THE HOSPITAL FOLLOW UP APPOINTMENT IS STILL NEEDED.

## 2023-07-31 ENCOUNTER — TELEPHONE (OUTPATIENT)
Dept: FAMILY MEDICINE CLINIC | Facility: CLINIC | Age: 78
End: 2023-07-31
Payer: MEDICARE

## 2023-08-04 ENCOUNTER — LAB (OUTPATIENT)
Dept: LAB | Facility: HOSPITAL | Age: 78
End: 2023-08-04
Payer: MEDICARE

## 2023-08-04 ENCOUNTER — OFFICE VISIT (OUTPATIENT)
Dept: UROLOGY | Facility: CLINIC | Age: 78
End: 2023-08-04
Payer: MEDICARE

## 2023-08-04 VITALS — WEIGHT: 210 LBS | HEIGHT: 68 IN | BODY MASS INDEX: 31.83 KG/M2 | RESPIRATION RATE: 16 BRPM

## 2023-08-04 DIAGNOSIS — R33.9 URINARY RETENTION: Primary | ICD-10-CM

## 2023-08-04 DIAGNOSIS — R20.2 NUMBNESS AND TINGLING OF LOWER EXTREMITY: ICD-10-CM

## 2023-08-04 DIAGNOSIS — N18.32 STAGE 3B CHRONIC KIDNEY DISEASE: ICD-10-CM

## 2023-08-04 DIAGNOSIS — Z00.00 MEDICARE ANNUAL WELLNESS VISIT, SUBSEQUENT: ICD-10-CM

## 2023-08-04 DIAGNOSIS — R20.0 NUMBNESS AND TINGLING OF LOWER EXTREMITY: ICD-10-CM

## 2023-08-04 DIAGNOSIS — E03.9 ACQUIRED HYPOTHYROIDISM: ICD-10-CM

## 2023-08-04 DIAGNOSIS — R33.9 URINARY RETENTION: ICD-10-CM

## 2023-08-04 LAB
ALBUMIN SERPL-MCNC: 4.3 G/DL (ref 3.5–5.2)
ALBUMIN/GLOB SERPL: 1.6 G/DL
ALP SERPL-CCNC: 70 U/L (ref 39–117)
ALT SERPL W P-5'-P-CCNC: 19 U/L (ref 1–33)
ANION GAP SERPL CALCULATED.3IONS-SCNC: 13 MMOL/L (ref 5–15)
AST SERPL-CCNC: 22 U/L (ref 1–32)
BASOPHILS # BLD AUTO: 0.04 10*3/MM3 (ref 0–0.2)
BASOPHILS NFR BLD AUTO: 0.4 % (ref 0–1.5)
BILIRUB BLD-MCNC: NEGATIVE MG/DL
BILIRUB SERPL-MCNC: 0.5 MG/DL (ref 0–1.2)
BUN SERPL-MCNC: 17 MG/DL (ref 8–23)
BUN/CREAT SERPL: 13.4 (ref 7–25)
CALCIUM SPEC-SCNC: 9.7 MG/DL (ref 8.6–10.5)
CHLORIDE SERPL-SCNC: 98 MMOL/L (ref 98–107)
CLARITY, POC: CLEAR
CO2 SERPL-SCNC: 28 MMOL/L (ref 22–29)
COLOR UR: YELLOW
CREAT SERPL-MCNC: 1.27 MG/DL (ref 0.57–1)
DEPRECATED RDW RBC AUTO: 43.6 FL (ref 37–54)
EGFRCR SERPLBLD CKD-EPI 2021: 43.4 ML/MIN/1.73
EOSINOPHIL # BLD AUTO: 0.07 10*3/MM3 (ref 0–0.4)
EOSINOPHIL NFR BLD AUTO: 0.8 % (ref 0.3–6.2)
ERYTHROCYTE [DISTWIDTH] IN BLOOD BY AUTOMATED COUNT: 13.2 % (ref 12.3–15.4)
EXPIRATION DATE: ABNORMAL
FOLATE SERPL-MCNC: 5.94 NG/ML (ref 4.78–24.2)
GLOBULIN UR ELPH-MCNC: 2.7 GM/DL
GLUCOSE SERPL-MCNC: 130 MG/DL (ref 65–99)
GLUCOSE UR STRIP-MCNC: NEGATIVE MG/DL
HCT VFR BLD AUTO: 33.6 % (ref 34–46.6)
HGB BLD-MCNC: 11.2 G/DL (ref 12–15.9)
IMM GRANULOCYTES # BLD AUTO: 0.05 10*3/MM3 (ref 0–0.05)
IMM GRANULOCYTES NFR BLD AUTO: 0.5 % (ref 0–0.5)
KETONES UR QL: NEGATIVE
LEUKOCYTE EST, POC: NEGATIVE
LYMPHOCYTES # BLD AUTO: 1.71 10*3/MM3 (ref 0.7–3.1)
LYMPHOCYTES NFR BLD AUTO: 18.6 % (ref 19.6–45.3)
Lab: ABNORMAL
MCH RBC QN AUTO: 30.7 PG (ref 26.6–33)
MCHC RBC AUTO-ENTMCNC: 33.3 G/DL (ref 31.5–35.7)
MCV RBC AUTO: 92.1 FL (ref 79–97)
MONOCYTES # BLD AUTO: 0.73 10*3/MM3 (ref 0.1–0.9)
MONOCYTES NFR BLD AUTO: 7.9 % (ref 5–12)
NEUTROPHILS NFR BLD AUTO: 6.61 10*3/MM3 (ref 1.7–7)
NEUTROPHILS NFR BLD AUTO: 71.8 % (ref 42.7–76)
NITRITE UR-MCNC: NEGATIVE MG/ML
NRBC BLD AUTO-RTO: 0.1 /100 WBC (ref 0–0.2)
PH UR: 6 [PH] (ref 5–8)
PLATELET # BLD AUTO: 255 10*3/MM3 (ref 140–450)
PMV BLD AUTO: 10.8 FL (ref 6–12)
POTASSIUM SERPL-SCNC: 4 MMOL/L (ref 3.5–5.2)
PROT SERPL-MCNC: 7 G/DL (ref 6–8.5)
PROT UR STRIP-MCNC: NEGATIVE MG/DL
RBC # BLD AUTO: 3.65 10*6/MM3 (ref 3.77–5.28)
RBC # UR STRIP: ABNORMAL /UL
SODIUM SERPL-SCNC: 139 MMOL/L (ref 136–145)
SP GR UR: 1.02 (ref 1–1.03)
TSH SERPL DL<=0.05 MIU/L-ACNC: 0.32 UIU/ML (ref 0.27–4.2)
URINE VOLUME: 181
UROBILINOGEN UR QL: NORMAL
VIT B12 BLD-MCNC: 442 PG/ML (ref 211–946)
WBC NRBC COR # BLD: 9.21 10*3/MM3 (ref 3.4–10.8)

## 2023-08-04 PROCEDURE — 82746 ASSAY OF FOLIC ACID SERUM: CPT

## 2023-08-04 PROCEDURE — 84443 ASSAY THYROID STIM HORMONE: CPT

## 2023-08-04 PROCEDURE — 80053 COMPREHEN METABOLIC PANEL: CPT

## 2023-08-04 PROCEDURE — 36415 COLL VENOUS BLD VENIPUNCTURE: CPT

## 2023-08-04 PROCEDURE — 82607 VITAMIN B-12: CPT

## 2023-08-04 PROCEDURE — 85025 COMPLETE CBC W/AUTO DIFF WBC: CPT

## 2023-08-15 ENCOUNTER — OFFICE VISIT (OUTPATIENT)
Dept: ORTHOPEDIC SURGERY | Facility: CLINIC | Age: 78
End: 2023-08-15
Payer: MEDICARE

## 2023-08-15 VITALS
HEART RATE: 80 BPM | OXYGEN SATURATION: 93 % | SYSTOLIC BLOOD PRESSURE: 145 MMHG | DIASTOLIC BLOOD PRESSURE: 78 MMHG | WEIGHT: 210.1 LBS | HEIGHT: 68 IN | BODY MASS INDEX: 31.84 KG/M2

## 2023-08-15 DIAGNOSIS — M70.61 TROCHANTERIC BURSITIS OF RIGHT HIP: ICD-10-CM

## 2023-08-15 DIAGNOSIS — M25.551 RIGHT HIP PAIN: ICD-10-CM

## 2023-08-15 DIAGNOSIS — M25.561 RIGHT KNEE PAIN, UNSPECIFIED CHRONICITY: Primary | ICD-10-CM

## 2023-08-15 DIAGNOSIS — M17.11 PRIMARY OSTEOARTHRITIS OF RIGHT KNEE: ICD-10-CM

## 2023-08-15 RX ORDER — SODIUM BICARBONATE 325 MG/1
TABLET ORAL
Qty: 60 TABLET | Refills: 0 | Status: SHIPPED | OUTPATIENT
Start: 2023-08-15

## 2023-08-15 RX ORDER — OMEPRAZOLE 40 MG/1
CAPSULE, DELAYED RELEASE ORAL
COMMUNITY
Start: 2023-08-13

## 2023-08-15 NOTE — PROGRESS NOTES
"Chief Complaint  Follow-up of the Right Knee and Follow-up of the Right Hip    Subjective          History of Present Illness      Sue Simmons is a 77 y.o. adult  presents to [facility] for     Patient presents for follow-up evaluation of right knee pain, right knee osteoarthritis, right hip pain and right hip bursitis.  She was seen by Dr. Meadows in early May she had a right knee and a right hip injection she is here for follow-up and states that her knee and hip are still doing well.  She and her daughter state that she has been doing well she denies pain in the hip or knee, he states she had a fall back in July and she is having home health physical therapy come work with her on strengthening and gait improvement and patient and daughter deny need for treatment today,.    Allergies   Allergen Reactions    Sulfa Antibiotics Itching        Social History     Socioeconomic History    Marital status:    Tobacco Use    Smoking status: Former     Years: 60.00     Types: Cigarettes     Quit date: 2022     Years since quittin.1    Smokeless tobacco: Never    Tobacco comments:     quit at age 59, second hand smoke exposure- never   Vaping Use    Vaping Use: Never used   Substance and Sexual Activity    Alcohol use: Not Currently     Comment: light    Drug use: Never    Sexual activity: Not Currently        REVIEW OF SYSTEMS    Constitutional: Denies fevers, chills, weight loss  Cardiovascular: Denies chest pain, shortness of breath  Skin: Denies rashes, acute skin changes  Neurologic: Denies headache, loss of consciousness  MSK: Right knee pain, right hip pain      Objective   Vital Signs:   /78   Pulse 80   Ht 172.7 cm (68\")   Wt 95.3 kg (210 lb 1.6 oz)   SpO2 93%   BMI 31.95 kg/mý     Body mass index is 31.95 kg/mý.    Physical Exam    Right knee: Skin is intact, no erythema, no ecchymosis, no swelling, no effusion, no signs of infection, full extension, " flexion 120, stable anterior/posterior drawer, stable to varus/valgus stress.  Nontender to palpation, no pain with range of motion. Negative Katia, Negative Lachman.    Right hip: Nontender to palpation of lateral hip over the greater trochanter, no pain with range of motion, flexion 90 external rotation 30 internal Tatian 20    Procedures    Imaging Results (Most Recent)       None             Result Review :   The following data was reviewed by: SEGUNDO Lake on 08/15/2023:               Assessment and Plan    Diagnoses and all orders for this visit:    1. Right knee pain, unspecified chronicity (Primary)    2. Right hip pain    3. Primary osteoarthritis of right knee    4. Trochanteric bursitis of right hip        Discussed diagnosis and treatment options with the patient and her daughter, patient was advised she can follow-up at any time for an injection of the right hip and right knee at this time there declining this and patient was advised to continue plan for home health physical therapy follow-up as needed.  Patient should be scheduled as soon as possible when she does need an injection    Call or return if worsening symptoms.    Follow Up   Return if symptoms worsen or fail to improve.  Patient was given instructions and counseling regarding Sue Simmons's condition or for health maintenance advice. Please see specific information pulled into the AVS if appropriate.

## 2023-08-23 ENCOUNTER — OFFICE VISIT (OUTPATIENT)
Dept: FAMILY MEDICINE CLINIC | Facility: CLINIC | Age: 78
End: 2023-08-23
Payer: MEDICARE

## 2023-08-23 VITALS
WEIGHT: 218 LBS | HEIGHT: 68 IN | SYSTOLIC BLOOD PRESSURE: 132 MMHG | TEMPERATURE: 97.5 F | HEART RATE: 96 BPM | BODY MASS INDEX: 33.04 KG/M2 | OXYGEN SATURATION: 96 % | RESPIRATION RATE: 16 BRPM | DIASTOLIC BLOOD PRESSURE: 80 MMHG

## 2023-08-23 DIAGNOSIS — E03.9 ACQUIRED HYPOTHYROIDISM: ICD-10-CM

## 2023-08-23 DIAGNOSIS — N18.32 STAGE 3B CHRONIC KIDNEY DISEASE: Primary | ICD-10-CM

## 2023-08-23 DIAGNOSIS — M79.89 LEG SWELLING: ICD-10-CM

## 2023-08-23 DIAGNOSIS — I10 PRIMARY HYPERTENSION: ICD-10-CM

## 2023-08-23 NOTE — PROGRESS NOTES
"Chief Complaint  Following up on CKD/hypertension/hypothyroid and leg swelling    Subjective         Sue Simmons is a 78 y.o. adult who presents to [facility]    78 years old comes to the clinic today to follow-up.    Patient reports no acute concerns at this time, seen by nephrologist recently.    CKD stage III; chronic, stable.    Lower extremity swelling; stable on Lasix for now    Hypothyroid; last blood work shows normal thyroid    Patient is taking Seroquel for some dementia and sundowning    12+ review of systems are unremarkable otherwise    Objective   Vital Signs:   Vitals:    08/23/23 1609   BP: 132/80   BP Location: Left arm   Patient Position: Sitting   Cuff Size: Adult   Pulse: 96   Resp: 16   Temp: 97.5 øF (36.4 øC)   TempSrc: Temporal   SpO2: 96%   Weight: 98.9 kg (218 lb)   Height: 172.7 cm (68\")      Body mass index is 33.15 kg/mý.   Wt Readings from Last 3 Encounters:   08/23/23 98.9 kg (218 lb)   08/15/23 95.3 kg (210 lb 1.6 oz)   08/04/23 95.3 kg (210 lb)      BP Readings from Last 3 Encounters:   08/23/23 132/80   08/15/23 145/78   07/07/23 137/63        Patient Care Team:  Ej Santoyo MD as PCP - General (Family Medicine)  Isabell Santana APRN as Nurse Practitioner (Urology)     Physical Exam  Vitals reviewed.   Constitutional:       Appearance: Normal appearance. Sue Simmons is well-developed.   HENT:      Head: Normocephalic and atraumatic.      Right Ear: External ear normal.      Left Ear: External ear normal.      Mouth/Throat:      Pharynx: No oropharyngeal exudate.   Eyes:      Conjunctiva/sclera: Conjunctivae normal.      Pupils: Pupils are equal, round, and reactive to light.   Cardiovascular:      Rate and Rhythm: Normal rate and regular rhythm.      Heart sounds: No murmur heard.    No friction rub. No gallop.   Pulmonary:      Effort: Pulmonary effort is normal.      Breath sounds: Normal breath sounds. No wheezing or rhonchi. "   Abdominal:      General: Bowel sounds are normal. There is no distension.      Palpations: Abdomen is soft.      Tenderness: There is no abdominal tenderness.   Skin:     General: Skin is warm and dry.      Comments: +1 lower extremity swelling and venous stasis dermatitis   Neurological:      Mental Status: Sue Simmons is alert and oriented to person, place, and time.      Cranial Nerves: No cranial nerve deficit.   Psychiatric:         Mood and Affect: Mood and affect normal.         Behavior: Behavior normal.         Thought Content: Thought content normal.         Judgment: Judgment normal.                      Assessment and Plan   Diagnoses and all orders for this visit:    1. Stage 3b chronic kidney disease (Primary)  Comments:  Stable, patient is aware to avoid NSAIDs, continue with nephrologist; recommendations reviewed and appreciated    2. Primary hypertension  Comments:  Stable blood pressure, DASH diet recommended    3. Acquired hypothyroidism  Comments:  Chronic, controlled, on levothyroxine    4. Leg swelling  Comments:  On Lasix, stable      Recent blood work and imaging work-up reviewed    Tobacco Use: Medium Risk    Smoking Tobacco Use: Former    Smokeless Tobacco Use: Never    Passive Exposure: Not on file            Follow Up   Return in about 6 months (around 2/23/2024).  Patient was given instructions and counseling regarding Sue Simmons's condition or for health maintenance advice. Please see specific information pulled into the AVS if appropriate.

## 2023-08-24 ENCOUNTER — EXTERNAL PBMM DATA (OUTPATIENT)
Dept: PHARMACY | Facility: OTHER | Age: 78
End: 2023-08-24
Payer: MEDICARE

## 2023-09-11 RX ORDER — QUETIAPINE FUMARATE 25 MG/1
25 TABLET, FILM COATED ORAL NIGHTLY
Qty: 90 TABLET | Refills: 1 | Status: SHIPPED | OUTPATIENT
Start: 2023-09-11

## 2023-09-18 RX ORDER — SODIUM BICARBONATE 325 MG/1
TABLET ORAL
Qty: 60 TABLET | Refills: 0 | Status: SHIPPED | OUTPATIENT
Start: 2023-09-18

## 2023-10-13 RX ORDER — SODIUM BICARBONATE 325 MG/1
TABLET ORAL
Qty: 60 TABLET | Refills: 0 | Status: SHIPPED | OUTPATIENT
Start: 2023-10-13

## 2023-10-14 DIAGNOSIS — R60.1 ANASARCA: ICD-10-CM

## 2023-10-14 DIAGNOSIS — M79.89 SWELLING OF LOWER EXTREMITY: ICD-10-CM

## 2023-10-14 DIAGNOSIS — M79.89 LEG SWELLING: ICD-10-CM

## 2023-10-16 RX ORDER — FUROSEMIDE 40 MG/1
TABLET ORAL
Qty: 180 TABLET | Refills: 1 | Status: SHIPPED | OUTPATIENT
Start: 2023-10-16

## 2023-10-23 RX ORDER — LEVOTHYROXINE SODIUM 0.1 MG/1
TABLET ORAL
Qty: 90 TABLET | Refills: 1 | Status: SHIPPED | OUTPATIENT
Start: 2023-10-23

## 2023-10-25 ENCOUNTER — HOSPITAL ENCOUNTER (OUTPATIENT)
Dept: OCCUPATIONAL THERAPY | Facility: HOSPITAL | Age: 78
Setting detail: THERAPIES SERIES
Discharge: HOME | End: 2023-10-25
Payer: MEDICARE

## 2023-11-03 ENCOUNTER — OFFICE VISIT (OUTPATIENT)
Dept: UROLOGY | Facility: CLINIC | Age: 78
End: 2023-11-03
Payer: MEDICARE

## 2023-11-03 VITALS
DIASTOLIC BLOOD PRESSURE: 80 MMHG | HEIGHT: 68 IN | RESPIRATION RATE: 14 BRPM | HEART RATE: 78 BPM | WEIGHT: 224 LBS | BODY MASS INDEX: 33.95 KG/M2 | SYSTOLIC BLOOD PRESSURE: 148 MMHG

## 2023-11-03 DIAGNOSIS — R33.9 URINARY RETENTION: Primary | ICD-10-CM

## 2023-11-03 PROBLEM — K21.9 GASTROESOPHAGEAL REFLUX DISEASE: Status: ACTIVE | Noted: 2023-08-20

## 2023-11-03 PROBLEM — R33.8 OTHER RETENTION OF URINE: Status: ACTIVE | Noted: 2023-08-20

## 2023-11-03 PROBLEM — N18.30 STAGE 3 CHRONIC KIDNEY DISEASE: Status: ACTIVE | Noted: 2022-02-15

## 2023-11-03 PROBLEM — N17.9 ACUTE KIDNEY INJURY: Status: ACTIVE | Noted: 2023-08-20

## 2023-11-03 PROBLEM — I12.9 BENIGN HYPERTENSION WITH CHRONIC KIDNEY DISEASE: Status: ACTIVE | Noted: 2023-08-20

## 2023-11-03 LAB
BILIRUB BLD-MCNC: NEGATIVE MG/DL
CLARITY, POC: CLEAR
COLOR UR: YELLOW
EXPIRATION DATE: ABNORMAL
GLUCOSE UR STRIP-MCNC: NEGATIVE MG/DL
KETONES UR QL: NEGATIVE
LEUKOCYTE EST, POC: NEGATIVE
Lab: ABNORMAL
NITRITE UR-MCNC: NEGATIVE MG/ML
PH UR: 6 [PH] (ref 5–8)
PROT UR STRIP-MCNC: NEGATIVE MG/DL
RBC # UR STRIP: ABNORMAL /UL
SP GR UR: 1.01 (ref 1–1.03)
URINE VOLUME: 119
UROBILINOGEN UR QL: NORMAL

## 2023-11-03 NOTE — PROGRESS NOTES
Chief Complaint: Urinary Retention (Pt here for follow up. )    Subjective         History of Present Illness  Sue Simmons is a 78 y.o. adult presents to [facility] to be seen for f/u urinary retention.    The patient has been working on increasing her water intake.    She has been decreasing tea intake     She has been timed and double voiding.    PVR in office today is 119    She has no complaints today.    They have seen nephrology and are following up with them in February.    I have no new labs since August however her last set of labs looked as if her GFR had been increasing.    Previous:  Patient was seen in the emergency department at [facility] on 6/28/2023 with complaints of multiple falls as well as increasing weakness and lethargy over several days.    The patient was found to be in acute renal failure with a possible urinary tract infection.  She did have some urinary retention whilst there and required catheterization.    A void trial was performed whilst in the hospital as well and she subsequently failed this and required repeat catheterization.    Catheter has been out since 7/9/23.    Her catheter has been out and she has been in long-term care facility recently     most recent labs form 7/7/23 creatinine 1.67 and GFR 31.2.    She presents today with PVR that is over 300    She is unable to void in office today.     She denies straining to void.    She does have some cognitive issues so this limits the ability to get good history from patient.        Objective     Past Medical History:   Diagnosis Date    Acute renal failure, unspecified acute renal failure type 6/28/2023    Anemia     Arthritis     Back pain with radiation 01/24/2013    Breast lump     Cancer     Cancer of right breast 02/2012    had lumpectomy, undergoing treatment at Dr. Campoverde's every Tuesday for 6 months, then radiation    Difficulty walking     Foot pain, right     Fracture, clavicle  1956    Hemorrhoids     Hip arthrosis 2022    Hypertension 03/2023    Hypothyroidism 01/24/2013    IC (interstitial cystitis)     Ingrowing toenail     Microscopic hematuria     Mood disorder     Plantar fasciitis     PONV (postoperative nausea and vomiting)     possibly    Rectal bleeding     Vitamin D deficiency        Past Surgical History:   Procedure Laterality Date    APPENDECTOMY      BREAST BIOPSY      right side    BREAST LUMPECTOMY Bilateral     BREAST SURGERY      COLONOSCOPY      CYST REMOVAL  1957    removed form coccyx    CYSTOSCOPY      EYE SURGERY      lasix    HARDWARE REMOVAL      PORTACATH PLACEMENT      TOENAIL EXCISION      TONSILLECTOMY           Current Outpatient Medications:     acetaminophen (TYLENOL) 325 MG tablet, Take 2 tablets by mouth Every 6 (Six) Hours As Needed for Mild Pain., Disp: , Rfl:     ammonium lactate (AmLactin) 12 % lotion, Apply  topically to the appropriate area as directed 2 (Two) Times a Day., Disp: 225 g, Rfl: 11    famotidine (Pepcid) 40 MG tablet, Take 1 tablet by mouth Daily., Disp: 90 tablet, Rfl: 1    furosemide (LASIX) 40 MG tablet, TAKE 1 TABLET BY MOUTH TWICE DAILY AS NEEDED FOR SWELLING, Disp: 180 tablet, Rfl: 1    levothyroxine (SYNTHROID, LEVOTHROID) 100 MCG tablet, TAKE 1 TABLET(100 MCG) BY MOUTH EVERY DAY, Disp: 90 tablet, Rfl: 1    magnesium hydroxide (MILK OF MAGNESIA) 400 MG/5ML suspension, Take  by mouth Daily As Needed for Constipation., Disp: , Rfl:     omeprazole (priLOSEC) 40 MG capsule, , Disp: , Rfl:     QUEtiapine (SEROquel) 25 MG tablet, TAKE 1 TABLET BY MOUTH EVERY NIGHT, Disp: 90 tablet, Rfl: 1    sodium bicarbonate 325 MG tablet, TAKE 1 TABLET BY MOUTH TWICE DAILY, Disp: 60 tablet, Rfl: 0    Allergies   Allergen Reactions    Sulfa Antibiotics Itching        Family History   Problem Relation Age of Onset    Lung cancer Mother     Brain cancer Mother     Diabetes Mother         due to Graves Disease    Throat cancer Mother     Arthritis  "Mother     Cancer Mother         Lung Cancer    Thyroid disease Mother         Graves Disease    Heart disease Father     Breast cancer Father         40s    Arthritis Father     Cancer Father         Breast Cancer    Rheumatologic disease Father         arthris in hands    Other Other         heart surgery in 2005    Alzheimer's disease Other     Graves' disease Other     Diabetes Maternal Aunt         unspecified type    Diabetes Maternal Uncle         unspecified type    Kidney nephrosis Maternal Uncle        Social History     Socioeconomic History    Marital status:    Tobacco Use    Smoking status: Former     Years: 60     Types: Cigarettes     Quit date: 2022     Years since quittin.3     Passive exposure: Past    Smokeless tobacco: Never    Tobacco comments:     quit at age 59, second hand smoke exposure- never   Vaping Use    Vaping Use: Never used   Substance and Sexual Activity    Alcohol use: Not Currently     Comment: light    Drug use: Never    Sexual activity: Not Currently       Vital Signs:   /80 (BP Location: Left arm, Patient Position: Sitting)   Pulse 78   Resp 14   Ht 172.7 cm (68\")   Wt 102 kg (224 lb)   BMI 34.06 kg/m²      Physical Exam     Result Review :   The following data was reviewed by: APURVA Adame on 2023:  Results for orders placed or performed in visit on 23   Bladder Scan   Result Value Ref Range    Urine Volume 119         Bladder Scan interpretation 11/3/2023    Estimation of residual urine via BVI 3000 Verathon Bladder Scan  MA/nurse performing: Alison little MA   Residual Urine: 119 ml  Indication: Urinary retention   Position: Supine  Examination: Incremental scanning of the suprapubic area using 2.0 MHz transducer using copious amounts of acoustic gel.   Findings: An anechoic area was demonstrated which represented the bladder, with measurement of residual urine as noted. I inspected this myself. In that the residual urine was stable " or insignificant, refer to plan for treatment and plan necessary at this time.          Procedures        Assessment and Plan    Diagnoses and all orders for this visit:    1. Urinary retention (Primary)  -     Comprehensive Metabolic Panel; Future  -     CBC & Differential; Future  -     POC Urinalysis Dipstick, Automated  -     Bladder Scan        As she is doing well at this point in time we will have her continue current plan of care.  She will get repeat lab work to check renal function in the next week or so.    We we will follow-up with her in 3 months or sooner if needed.        I spent 10 minutes caring for Sue on this date of service. This time includes time spent by me in the following activities:reviewing tests, obtaining and/or reviewing a separately obtained history, performing a medically appropriate examination and/or evaluation , counseling and educating the patient/family/caregiver, ordering medications, tests, or procedures, and documenting information in the medical record  Follow Up   Return in about 3 months (around 2/3/2024) for f/u retention .  Patient was given instructions and counseling regarding Sue Simmons's condition or for health maintenance advice. Please see specific information pulled into the AVS if appropriate.         This document has been electronically signed by APURVA Adame  November 3, 2023 10:37 EDT

## 2023-11-27 RX ORDER — SODIUM BICARBONATE 325 MG/1
TABLET ORAL
Qty: 60 TABLET | Refills: 0 | Status: SHIPPED | OUTPATIENT
Start: 2023-11-27

## 2023-12-02 DIAGNOSIS — K21.9 GASTROESOPHAGEAL REFLUX DISEASE WITHOUT ESOPHAGITIS: ICD-10-CM

## 2023-12-04 RX ORDER — FAMOTIDINE 40 MG/1
40 TABLET, FILM COATED ORAL DAILY
Qty: 90 TABLET | Refills: 1 | Status: SHIPPED | OUTPATIENT
Start: 2023-12-04

## 2024-01-04 RX ORDER — SODIUM BICARBONATE 325 MG/1
TABLET ORAL
Qty: 60 TABLET | Refills: 0 | Status: SHIPPED | OUTPATIENT
Start: 2024-01-04

## 2024-01-31 ENCOUNTER — HOSPITAL ENCOUNTER (OUTPATIENT)
Dept: OCCUPATIONAL THERAPY | Facility: HOSPITAL | Age: 79
Setting detail: THERAPIES SERIES
Discharge: HOME | End: 2024-01-31
Payer: MEDICARE

## 2024-01-31 ENCOUNTER — LAB (OUTPATIENT)
Dept: LAB | Facility: HOSPITAL | Age: 79
End: 2024-01-31
Payer: MEDICARE

## 2024-01-31 DIAGNOSIS — R33.9 URINARY RETENTION: ICD-10-CM

## 2024-01-31 DIAGNOSIS — Z90.11 HISTORY OF MASTECTOMY, RIGHT: Primary | ICD-10-CM

## 2024-01-31 DIAGNOSIS — M25.551 CHRONIC PAIN OF RIGHT HIP: ICD-10-CM

## 2024-01-31 DIAGNOSIS — Z44.9 FITTING AND ADJUSTMENT OF UNSPECIFIED PROSTHETIC DEVICE: ICD-10-CM

## 2024-01-31 DIAGNOSIS — M79.89 LEG SWELLING: ICD-10-CM

## 2024-01-31 DIAGNOSIS — K21.9 GASTROESOPHAGEAL REFLUX DISEASE WITHOUT ESOPHAGITIS: ICD-10-CM

## 2024-01-31 DIAGNOSIS — N64.89 DEFORMITY DUE TO MASTECTOMY: ICD-10-CM

## 2024-01-31 DIAGNOSIS — R60.1 ANASARCA: ICD-10-CM

## 2024-01-31 DIAGNOSIS — C50.411 MALIGNANT NEOPLASM OF UPPER-OUTER QUADRANT OF RIGHT BREAST IN FEMALE, ESTROGEN RECEPTOR POSITIVE: ICD-10-CM

## 2024-01-31 DIAGNOSIS — G89.29 CHRONIC PAIN OF RIGHT HIP: ICD-10-CM

## 2024-01-31 DIAGNOSIS — M25.561 CHRONIC PAIN OF RIGHT KNEE: ICD-10-CM

## 2024-01-31 DIAGNOSIS — G89.29 CHRONIC PAIN OF RIGHT KNEE: ICD-10-CM

## 2024-01-31 DIAGNOSIS — Z17.0 MALIGNANT NEOPLASM OF UPPER-OUTER QUADRANT OF RIGHT BREAST IN FEMALE, ESTROGEN RECEPTOR POSITIVE: ICD-10-CM

## 2024-01-31 DIAGNOSIS — Z90.10 DEFORMITY DUE TO MASTECTOMY: ICD-10-CM

## 2024-01-31 DIAGNOSIS — N18.31 STAGE 3A CHRONIC KIDNEY DISEASE: ICD-10-CM

## 2024-01-31 LAB
ALBUMIN SERPL-MCNC: 4.4 G/DL (ref 3.5–5.2)
ALBUMIN/GLOB SERPL: 1.8 G/DL
ALP SERPL-CCNC: 75 U/L (ref 39–117)
ALT SERPL W P-5'-P-CCNC: 21 U/L (ref 1–33)
ANION GAP SERPL CALCULATED.3IONS-SCNC: 18 MMOL/L (ref 5–15)
AST SERPL-CCNC: 23 U/L (ref 1–32)
BASOPHILS # BLD AUTO: 0.02 10*3/MM3 (ref 0–0.2)
BASOPHILS NFR BLD AUTO: 0.3 % (ref 0–1.5)
BILIRUB SERPL-MCNC: 0.5 MG/DL (ref 0–1.2)
BUN SERPL-MCNC: 20 MG/DL (ref 8–23)
BUN/CREAT SERPL: 14.4 (ref 7–25)
CALCIUM SPEC-SCNC: 9.5 MG/DL (ref 8.6–10.5)
CHLORIDE SERPL-SCNC: 95 MMOL/L (ref 98–107)
CO2 SERPL-SCNC: 26 MMOL/L (ref 22–29)
CREAT SERPL-MCNC: 1.39 MG/DL (ref 0.57–1)
DEPRECATED RDW RBC AUTO: 45.4 FL (ref 37–54)
EGFRCR SERPLBLD CKD-EPI 2021: 38.7 ML/MIN/1.73
EOSINOPHIL # BLD AUTO: 0.06 10*3/MM3 (ref 0–0.4)
EOSINOPHIL NFR BLD AUTO: 0.9 % (ref 0.3–6.2)
ERYTHROCYTE [DISTWIDTH] IN BLOOD BY AUTOMATED COUNT: 14 % (ref 12.3–15.4)
GLOBULIN UR ELPH-MCNC: 2.5 GM/DL
GLUCOSE SERPL-MCNC: 145 MG/DL (ref 65–99)
HCT VFR BLD AUTO: 36.1 % (ref 34–46.6)
HGB BLD-MCNC: 12 G/DL (ref 12–15.9)
LYMPHOCYTES # BLD AUTO: 1.63 10*3/MM3 (ref 0.7–3.1)
LYMPHOCYTES NFR BLD AUTO: 25.5 % (ref 19.6–45.3)
MCH RBC QN AUTO: 29.8 PG (ref 26.6–33)
MCHC RBC AUTO-ENTMCNC: 33.2 G/DL (ref 31.5–35.7)
MCV RBC AUTO: 89.6 FL (ref 79–97)
MONOCYTES # BLD AUTO: 0.56 10*3/MM3 (ref 0.1–0.9)
MONOCYTES NFR BLD AUTO: 8.8 % (ref 5–12)
NEUTROPHILS NFR BLD AUTO: 4.1 10*3/MM3 (ref 1.7–7)
NEUTROPHILS NFR BLD AUTO: 64.3 % (ref 42.7–76)
PLATELET # BLD AUTO: 213 10*3/MM3 (ref 140–450)
PMV BLD AUTO: 12.4 FL (ref 6–12)
POTASSIUM SERPL-SCNC: 3.7 MMOL/L (ref 3.5–5.2)
PROT SERPL-MCNC: 6.9 G/DL (ref 6–8.5)
RBC # BLD AUTO: 4.03 10*6/MM3 (ref 3.77–5.28)
SODIUM SERPL-SCNC: 139 MMOL/L (ref 136–145)
WBC NRBC COR # BLD AUTO: 6.38 10*3/MM3 (ref 3.4–10.8)

## 2024-01-31 PROCEDURE — L8000 MASTECTOMY BRA: HCPCS | Performed by: OCCUPATIONAL THERAPIST

## 2024-01-31 PROCEDURE — 36415 COLL VENOUS BLD VENIPUNCTURE: CPT

## 2024-01-31 PROCEDURE — 85025 COMPLETE CBC W/AUTO DIFF WBC: CPT

## 2024-01-31 PROCEDURE — 80053 COMPREHEN METABOLIC PANEL: CPT

## 2024-01-31 PROCEDURE — 97535 SELF CARE MNGMENT TRAINING: CPT | Performed by: OCCUPATIONAL THERAPIST

## 2024-01-31 NOTE — THERAPY RE-EVALUATION
Outpatient Occupational Therapy Lymphedema Re-Evaluation  [facility]     Patient Name: Sue Simmons  : 1945  MRN: [id number]  Today's Date: 2024      Visit Date: 2024    Patient Active Problem List   Diagnosis    Acquired hypothyroidism    Right hip pain    Stage 3 chronic kidney disease    History of breast cancer    Anemia    Arthritis    Back pain with radiation    Breast lump    Cancer    Cancer of right breast    Family history of coronary artery disease    Family history of lung cancer    Family history of thyroid disease    Foot pain, right    Hematuria, microscopic    Hemorrhoids    IC (interstitial cystitis)    Ingrown toenail    Mood disorder    Rectal bleeding    Varicose veins of lower extremities with inflammation    Venous insufficiency (chronic) (peripheral)    Vitamin D deficiency    Trochanteric bursitis of right hip    Osteoarthritis of both hips    Right knee pain    Primary osteoarthritis of right knee    Acute renal failure, unspecified acute renal failure type    Acute kidney injury    Benign hypertension with chronic kidney disease    Gastroesophageal reflux disease    Other retention of urine        Past Medical History:   Diagnosis Date    Acute renal failure, unspecified acute renal failure type 2023    Anemia     Arthritis     Back pain with radiation 2013    Breast lump     Cancer     Cancer of right breast 2012    had lumpectomy, undergoing treatment at Dr. Campoverde's every Tuesday for 6 months, then radiation    Difficulty walking     Foot pain, right     Fracture, clavicle     Hemorrhoids     Hip arthrosis     Hypertension 2023    Hypothyroidism 2013    IC (interstitial cystitis)     Ingrowing toenail     Microscopic hematuria     Mood disorder     Plantar fasciitis     PONV (postoperative nausea and vomiting)     possibly    Rectal bleeding     Vitamin D deficiency         Past Surgical History:   Procedure Laterality Date     APPENDECTOMY      BREAST BIOPSY      right side    BREAST LUMPECTOMY Bilateral     BREAST SURGERY      COLONOSCOPY      CYST REMOVAL  1957    removed form coccyx    CYSTOSCOPY      EYE SURGERY      lasix    HARDWARE REMOVAL      PORTACATH PLACEMENT      TOENAIL EXCISION      TONSILLECTOMY           Visit Dx:     ICD-10-CM ICD-9-CM   1. History of mastectomy, right  Z90.11 V45.71   2. 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 Fitting and adjustment of unspecified prosthetic device  Z44.9 V52.9   4. Malignant neoplasm of upper-outer quadrant of right breast in female, estrogen receptor positive  C50.411 174.4    Z17.0 V86.0        Patient History       Row Name 01/31/24 1000             History    Brief Description of Current Complaint Patient has right breast cancer in 2018 that resulted in lumpectomy that caused a great amount of tissue to be removed from the right breast.  -TD         Fall Risk Assessment    Any falls in the past year: Yes  -TD      Does patient have a fear of falling Yes (comment)  -TD         Services    Are you currently receiving Home Health services No  -TD      Do you plan to receive Home Health services in the near future No  -TD         Daily Activities    Primary Language English  -TD      Are you able to read Yes  -TD      Are you able to write Yes  -TD      How does patient learn best? Listening;Reading;Demonstration;Pictures/Video  -TD         Safety    Are you being hurt, hit, or frightened by anyone at home or in your life? No  -TD      Are you being neglected by a caregiver No  -TD      Have you had any of the following issues with N/A  -TD                User Key  (r) = Recorded By, (t) = Taken By, (c) = Cosigned By      Initials Name Provider Type    TD Gabby Pena OT Occupational Therapist                         OT Ortho       Row Name 01/31/24 1000             Orthotics & Prosthetics Management    Orthosis Location other (see comments)  Breast prosthsis and  post mastectomy orthosis.  -TD      Additional Documentation Orthosis Location (Row)  -TD                User Key  (r) = Recorded By, (t) = Taken By, (c) = Cosigned By      Initials Name Provider Type    TD Gabby Pena, ZACKARY Occupational Therapist                    OT Mastectomy Care:   Location:        Right chest wall     Type of Prosthetic:  Silicone breast form     Reason for Visit/Customer Goal:  Patient would like to achieve symmetry and balance in appearance to improve orthopedic balance as well as improve psychological impact when engaging in community and social activities.     Reason for Prosthetic: Prevent Deformities     Date of Surgery: 2018     Date of Discharge: 2018     Wearing Schedule:   As Tolerated     Prosthetic Site Condition:   Intact     Tolerance:      Tolerates Well     Product :  Harpreet     Product Name and Model Number:   (Issued: 6/12/2023)  402 Natura light 1Sn size: 5     Garments  Type of Garment Dispensed:          Mast Bra w/o Breast Form     Breast : Harpreet     Product Name:   1- Elaine DKGY 46D  1- Elaine nude 46D       1- Joann nude 46D     Number of Garments Dispensed:   3    Therapy Education  Education Details: Fit and size are appropriate with no gapping, pinching, or puckering observed. Pt. states comfort and satisfaction with both bra's selected and with prosthesis. All devices were checked for quality and safety. Pt. was educated on the benefits, precautions warranty, care and maintenance for her prosthesis and bras. Educational handout was provided in the prosthesis box  Given: Symptoms/condition management  Program: Reinforced  How Provided: Verbal  Provided to: Patient, Caregiver  Level of Understanding: Verbalized  91000 - OT Self Care/Mgmt Minutes: 40         OT Goals       Row Name 01/31/24 1045          Time Calculation    OT Goal Re-Cert Due Date 03/01/24  -TD               User Key  (r) = Recorded By, (t) = Taken By, (c) = Cosigned By       Initials Name Provider Type    Gabby Engel, ZACKARY Occupational Therapist                  1. Encounter for Breast Prosthetic and mastectomy bra fitting:  LTG 1:  90 days: To provide balance and symmetry for performance of daily activity, the patient will participate in breast prosthesis and mastectomy bra fitting procedure.              STATUS: on going  STG 1a: 30 days:  Patient will participate in mastectomy bra fit re-evaluation to ensure proper fit for balance and symmetry for improved postural alignment/lymph fluid dynamics to prevent impairment in activities of daily living.              STATUS: on going  STG 1b: 30 days:  Patient will participate in breast prosthesis fit re-evaluation 2 years after initial distribution to ensure proper fit for balance and symmetry for improved postural alignment/lymph fluid dynamics to prevent impairment in activities of daily living.  STATUS: on going  TREATMENT: Orthotic/Prosthetic Management and Training, Amoena  Silicone Breast Prosthetic, Mastectomy Bra initial fitting and month re-fitting, ADL/Self Care, Therapeutic Activity, Therapeutic Exercise, and Manual Therapy.    OT Assessment/Plan       Row Name 01/31/24 1044          OT Assessment    Functional Limitations Decreased safety during functional activities  -TD     Impairments Coordination;Balance;Edema;Impaired lymphatic circulation;Impaired venous circulation  -TD     Assessment Comments Pt presents with decreased balance and symmetry from breast resection that impacts orthopedic balance and symmetry. Patient will benefit from continued evaluation and of integrity of the silicone breast form as well as the mastectomy bras to ensure proper fit for symmetry and balance of breast prosthesis to reduce orthopedic and lymphatic impairment. The skills of a therapist will be required to safely and effectively implement the following treatment plan to restore maximal level of function.  -TD     OT Diagnosis  deformity due to mastectomy  -TD     OT Rehab Potential Good  -TD     Patient/caregiver participated in establishment of treatment plan and goals Yes  -TD     Patient would benefit from skilled therapy intervention Yes  -TD        OT Plan    OT Frequency --  see duration  -TD     Predicted Duration of Therapy Intervention (OT) Pt will be seen every 3-4 months for bras re fills and every 2 years for breast prothetics.  -TD     Planned CPT's? OT EVAL LOW COMPLEXITY: 70315;OT EVAL MOD COMPLEXITY: 11419;OT EVAL HIGH COMPLEXITY: 07563;OT RE-EVAL: 67739;OT THER PROC EA 15 MIN: [id number];OT THER ACT EA 15 MIN: 85469ZF;OT MANUAL THERAPY EA 15 MIN: 08017;OT ORTHO/PROSTHET CHECKOUT EA 15 MIN: 63162;OT ORTHOTIC MGMT/TRAIN EA 15 MIN: 03524  -TD     Planned Therapy Interventions (Optional Details) manual therapy techniques;prosthetic fitting/training;ROM (Range of Motion);patient/family education;orthotic fitting/training  -TD     OT Plan Comments continue POC  -TD               User Key  (r) = Recorded By, (t) = Taken By, (c) = Cosigned By      Initials Name Provider Type    TD Gabby Pena OT Occupational Therapist                              Time Calculation:   Timed Charges  53742 - OT Self Care/Mgmt Minutes: 40  Total Minutes  Timed Charges Total Minutes: 40   Total Minutes: 40     Therapy Charges for Today       Code Description Service Date Service Provider Modifiers Qty    [id number]  OT SELF CARE/MGMT/TRAIN EA 15 MIN 1/31/2024 Gabby Pena OT GO 3    [id number]  BRA POST/SURG NO/FORM PACHECO/DESTINY/NELLIE/ADELITA/ANNAMARIA 1/31/2024 Gabby Pena OT  3                      Gabby Pena OT  1/31/2024

## 2024-02-01 RX ORDER — SODIUM BICARBONATE 325 MG/1
TABLET ORAL
Qty: 60 TABLET | Refills: 0 | Status: SHIPPED | OUTPATIENT
Start: 2024-02-01

## 2024-02-02 ENCOUNTER — OFFICE VISIT (OUTPATIENT)
Dept: UROLOGY | Facility: CLINIC | Age: 79
End: 2024-02-02
Payer: MEDICARE

## 2024-02-02 VITALS — HEIGHT: 68 IN | BODY MASS INDEX: 34.22 KG/M2 | WEIGHT: 225.8 LBS

## 2024-02-02 DIAGNOSIS — R33.9 URINARY RETENTION: Primary | ICD-10-CM

## 2024-02-02 LAB — URINE VOLUME: 76

## 2024-02-02 RX ORDER — TRAMADOL HYDROCHLORIDE 50 MG/1
50 TABLET ORAL EVERY 8 HOURS PRN
COMMUNITY
Start: 2023-11-22

## 2024-02-02 RX ORDER — LISINOPRIL 10 MG/1
1 TABLET ORAL DAILY
COMMUNITY
Start: 2023-11-22

## 2024-02-02 NOTE — PROGRESS NOTES
Chief Complaint: Urinary Retention (Pt is here today for a follow up. Pt's daughter is with her today and reports that the pt was in the hospital recently with kidney failure and the pt was diagnosed with chronic kidney disease. Pt's daughter reports that it seems that the pt's urinary retention has improved but her overall swelling has become worse. )    Subjective         History of Present Illness  Sue Simmons is a 78 y.o. adult presents to [facility] to be seen for f/u urinary retention.    She has had no issues with retention recently.     Her PVR is 76.     She is having more swelling in her legs and her belly and this is how this started when she went into renal failure.    1/31/24 gfr was 38 creatinine was 1.39.        Previous:     The patient has been working on increasing her water intake.    She has been decreasing tea intake     She has been timed and double voiding.    PVR in office today is 119    She has no complaints today.    They have seen nephrology and are following up with them in February.    I have no new labs since August however her last set of labs looked as if her GFR had been increasing.    Patient was seen in the emergency department at [facility] on 6/28/2023 with complaints of multiple falls as well as increasing weakness and lethargy over several days.    The patient was found to be in acute renal failure with a possible urinary tract infection.  She did have some urinary retention whilst there and required catheterization.    A void trial was performed whilst in the hospital as well and she subsequently failed this and required repeat catheterization.    Catheter has been out since 7/9/23.    Her catheter has been out and she has been in long-term care facility recently     most recent labs form 7/7/23 creatinine 1.67 and GFR 31.2.    She presents today with PVR that is over 300    She is unable to void in office today.     She denies  straining to void.    She does have some cognitive issues so this limits the ability to get good history from patient.        Objective     Past Medical History:   Diagnosis Date    Acute renal failure, unspecified acute renal failure type 06/28/2023    Anemia     Arthritis     Arthritis 02/22/2023    Back pain with radiation 01/24/2013    Breast lump     Breast lump 02/22/2023    Cancer     Cancer of right breast 02/2012    had lumpectomy, undergoing treatment at Dr. Campoverde's every Tuesday for 6 months, then radiation    Difficulty walking     Foot pain, right     Fracture, clavicle 1956    Hemorrhoids     Hip arthrosis 2022    Hypertension 03/2023    Hypothyroidism 01/24/2013    IC (interstitial cystitis)     Ingrowing toenail     Microscopic hematuria     Mood disorder     Plantar fasciitis     PONV (postoperative nausea and vomiting)     possibly    Rectal bleeding     Urinary tract infection 07/2023    Vitamin D deficiency        Past Surgical History:   Procedure Laterality Date    APPENDECTOMY      BREAST BIOPSY      right side    BREAST LUMPECTOMY Bilateral     BREAST SURGERY      COLONOSCOPY      CYST REMOVAL  1957    removed form coccyx    CYSTOSCOPY      EYE SURGERY      lasix    HARDWARE REMOVAL      PORTACATH PLACEMENT      TOENAIL EXCISION      TONSILLECTOMY      TUBAL ABDOMINAL LIGATION  1978         Current Outpatient Medications:     acetaminophen (TYLENOL) 325 MG tablet, Take 2 tablets by mouth Every 6 (Six) Hours As Needed for Mild Pain., Disp: , Rfl:     ammonium lactate (AmLactin) 12 % lotion, Apply  topically to the appropriate area as directed 2 (Two) Times a Day., Disp: 225 g, Rfl: 11    famotidine (PEPCID) 40 MG tablet, TAKE 1 TABLET BY MOUTH DAILY, Disp: 90 tablet, Rfl: 1    furosemide (LASIX) 40 MG tablet, TAKE 1 TABLET BY MOUTH TWICE DAILY AS NEEDED FOR SWELLING, Disp: 180 tablet, Rfl: 1    levothyroxine (SYNTHROID, LEVOTHROID) 100 MCG tablet, TAKE 1 TABLET(100 MCG) BY MOUTH EVERY DAY,  Disp: 90 tablet, Rfl: 1    lisinopril (PRINIVIL,ZESTRIL) 10 MG tablet, Take 1 tablet by mouth Daily., Disp: , Rfl:     magnesium hydroxide (MILK OF MAGNESIA) 400 MG/5ML suspension, Take  by mouth Daily As Needed for Constipation., Disp: , Rfl:     omeprazole (priLOSEC) 40 MG capsule, , Disp: , Rfl:     QUEtiapine (SEROquel) 25 MG tablet, TAKE 1 TABLET BY MOUTH EVERY NIGHT, Disp: 90 tablet, Rfl: 1    sodium bicarbonate 325 MG tablet, TAKE 1 TABLET BY MOUTH TWICE DAILY, Disp: 60 tablet, Rfl: 0    traMADol (ULTRAM) 50 MG tablet, Take 1 tablet by mouth Every 8 (Eight) Hours As Needed for Moderate Pain., Disp: , Rfl:     Allergies   Allergen Reactions    Sulfa Antibiotics Itching        Family History   Problem Relation Age of Onset    Lung cancer Mother     Brain cancer Mother     Diabetes Mother         due to Graves Disease    Throat cancer Mother     Arthritis Mother     Cancer Mother         Lung Cancer    Thyroid disease Mother         Graves Disease    Hypertension Mother     Heart disease Father     Breast cancer Father         40s    Arthritis Father     Cancer Father         Breast Cancer    Rheumatologic disease Father         arthris in hands    Other Other         heart surgery in     Alzheimer's disease Other     Graves' disease Other     Diabetes Maternal Aunt         unspecified type    Diabetes Maternal Uncle         unspecified type    Kidney nephrosis Maternal Uncle        Social History     Socioeconomic History    Marital status:    Tobacco Use    Smoking status: Former     Years: 60     Types: Cigarettes     Start date: 1960     Quit date: 2022     Years since quittin.5     Passive exposure: Past    Smokeless tobacco: Never    Tobacco comments:     quit at age 59, second hand smoke exposure- never   Vaping Use    Vaping Use: Never used   Substance and Sexual Activity    Alcohol use: Not Currently     Comment: light    Drug use: Never    Sexual activity: Not Currently  "      Vital Signs:   Ht 172.7 cm (68\")   Wt 102 kg (225 lb 12.8 oz)   BMI 34.33 kg/m²      Physical Exam     Result Review :   The following data was reviewed by: APURVA Adame on 02/2/2024:  Results for orders placed or performed in visit on 02/02/24   Bladder Scan   Result Value Ref Range    Urine Volume 76         Bladder Scan interpretation 2/2/2024    Estimation of residual urine via BVI 3000 [address] Bladder Scan  MA/nurse performing: Alison little MA   Residual Urine: 76 ml  Indication: Urinary retention   Position: Supine  Examination: Incremental scanning of the suprapubic area using 2.0 MHz transducer using copious amounts of acoustic gel.   Findings: An anechoic area was demonstrated which represented the bladder, with measurement of residual urine as noted. I inspected this myself. In that the residual urine was stable or insignificant, refer to plan for treatment and plan necessary at this time.          Procedures        Assessment and Plan    Diagnoses and all orders for this visit:    1. Urinary retention (Primary)  -     Cancel: POC Urinalysis Dipstick, Automated  -     Bladder Scan        She has maintained low PVR no utis at this time.    Discussed that they may mention Seroquel to pcp and nephrology as a potential cause of worsening CKD.            I spent 10 minutes caring for Sue on this date of service. This time includes time spent by me in the following activities:reviewing tests, obtaining and/or reviewing a separately obtained history, performing a medically appropriate examination and/or evaluation , counseling and educating the patient/family/caregiver, ordering medications, tests, or procedures, and documenting information in the medical record  Follow Up   Return in about 6 months (around 8/2/2024) for f/u with PVR .  Patient was given instructions and counseling regarding Sue A Friess's condition or for health maintenance advice. Please see specific information pulled into " the AVS if appropriate.         This document has been electronically signed by APURVA Adame  February 2, 2024 11:09 EST

## 2024-02-13 ENCOUNTER — TRANSCRIBE ORDERS (OUTPATIENT)
Dept: ADMINISTRATIVE | Facility: HOSPITAL | Age: 79
End: 2024-02-13
Payer: MEDICARE

## 2024-02-13 DIAGNOSIS — N17.9 ACUTE RENAL FAILURE, UNSPECIFIED ACUTE RENAL FAILURE TYPE: Primary | ICD-10-CM

## 2024-02-13 DIAGNOSIS — N18.30 STAGE 3 CHRONIC KIDNEY DISEASE, UNSPECIFIED WHETHER STAGE 3A OR 3B CKD: ICD-10-CM

## 2024-02-17 ENCOUNTER — HOSPITAL ENCOUNTER (EMERGENCY)
Facility: HOSPITAL | Age: 79
Discharge: HOME | End: 2024-02-17
Attending: EMERGENCY MEDICINE
Payer: MEDICARE

## 2024-02-17 ENCOUNTER — APPOINTMENT (OUTPATIENT)
Dept: GENERAL RADIOLOGY | Facility: HOSPITAL | Age: 79
End: 2024-02-17
Payer: MEDICARE

## 2024-02-17 VITALS
HEART RATE: 71 BPM | SYSTOLIC BLOOD PRESSURE: 131 MMHG | HEIGHT: 68 IN | TEMPERATURE: 98.2 F | DIASTOLIC BLOOD PRESSURE: 57 MMHG | OXYGEN SATURATION: 100 % | RESPIRATION RATE: 18 BRPM | BODY MASS INDEX: 34.34 KG/M2

## 2024-02-17 DIAGNOSIS — R06.02 SHORTNESS OF BREATH: Primary | ICD-10-CM

## 2024-02-17 DIAGNOSIS — U07.1 COVID-19 VIRUS INFECTION: ICD-10-CM

## 2024-02-17 LAB
ALBUMIN SERPL-MCNC: 4.3 G/DL (ref 3.5–5.2)
ALBUMIN/GLOB SERPL: 1.4 G/DL
ALP SERPL-CCNC: 70 U/L (ref 39–117)
ALT SERPL W P-5'-P-CCNC: 21 U/L (ref 1–33)
ANION GAP SERPL CALCULATED.3IONS-SCNC: 14.7 MMOL/L (ref 5–15)
AST SERPL-CCNC: 23 U/L (ref 1–32)
BASOPHILS # BLD AUTO: 0.02 10*3/MM3 (ref 0–0.2)
BASOPHILS NFR BLD AUTO: 0.3 % (ref 0–1.5)
BILIRUB SERPL-MCNC: 0.6 MG/DL (ref 0–1.2)
BUN SERPL-MCNC: 19 MG/DL (ref 8–23)
BUN/CREAT SERPL: 15 (ref 7–25)
CALCIUM SPEC-SCNC: 9.5 MG/DL (ref 8.6–10.5)
CHLORIDE SERPL-SCNC: 94 MMOL/L (ref 98–107)
CO2 SERPL-SCNC: 27.3 MMOL/L (ref 22–29)
CREAT SERPL-MCNC: 1.27 MG/DL (ref 0.57–1)
DEPRECATED RDW RBC AUTO: 49.2 FL (ref 37–54)
EGFRCR SERPLBLD CKD-EPI 2021: 43.1 ML/MIN/1.73
EOSINOPHIL # BLD AUTO: 0.01 10*3/MM3 (ref 0–0.4)
EOSINOPHIL NFR BLD AUTO: 0.2 % (ref 0.3–6.2)
ERYTHROCYTE [DISTWIDTH] IN BLOOD BY AUTOMATED COUNT: 14.7 % (ref 12.3–15.4)
FLUAV SUBTYP SPEC NAA+PROBE: NOT DETECTED
FLUBV RNA ISLT QL NAA+PROBE: NOT DETECTED
GLOBULIN UR ELPH-MCNC: 3 GM/DL
GLUCOSE SERPL-MCNC: 114 MG/DL (ref 65–99)
HCT VFR BLD AUTO: 35.8 % (ref 34–46.6)
HGB BLD-MCNC: 11.4 G/DL (ref 12–15.9)
HOLD SPECIMEN: NORMAL
HOLD SPECIMEN: NORMAL
IMM GRANULOCYTES # BLD AUTO: 0.02 10*3/MM3 (ref 0–0.05)
IMM GRANULOCYTES NFR BLD AUTO: 0.3 % (ref 0–0.5)
LYMPHOCYTES # BLD AUTO: 0.96 10*3/MM3 (ref 0.7–3.1)
LYMPHOCYTES NFR BLD AUTO: 15.4 % (ref 19.6–45.3)
MCH RBC QN AUTO: 29.1 PG (ref 26.6–33)
MCHC RBC AUTO-ENTMCNC: 31.8 G/DL (ref 31.5–35.7)
MCV RBC AUTO: 91.3 FL (ref 79–97)
MONOCYTES # BLD AUTO: 0.71 10*3/MM3 (ref 0.1–0.9)
MONOCYTES NFR BLD AUTO: 11.4 % (ref 5–12)
NEUTROPHILS NFR BLD AUTO: 4.51 10*3/MM3 (ref 1.7–7)
NEUTROPHILS NFR BLD AUTO: 72.4 % (ref 42.7–76)
NRBC BLD AUTO-RTO: 0 /100 WBC (ref 0–0.2)
NT-PROBNP SERPL-MCNC: 163.4 PG/ML (ref 0–1800)
PLATELET # BLD AUTO: 192 10*3/MM3 (ref 140–450)
PMV BLD AUTO: 11.1 FL (ref 6–12)
POTASSIUM SERPL-SCNC: 3.4 MMOL/L (ref 3.5–5.2)
PROT SERPL-MCNC: 7.3 G/DL (ref 6–8.5)
QT INTERVAL: 406 MS
QTC INTERVAL: 453 MS
RBC # BLD AUTO: 3.92 10*6/MM3 (ref 3.77–5.28)
RSV RNA NPH QL NAA+NON-PROBE: NOT DETECTED
SARS-COV-2 RNA RESP QL NAA+PROBE: DETECTED
SODIUM SERPL-SCNC: 136 MMOL/L (ref 136–145)
TROPONIN T SERPL HS-MCNC: 20 NG/L
WBC NRBC COR # BLD AUTO: 6.23 10*3/MM3 (ref 3.4–10.8)
WHOLE BLOOD HOLD COAG: NORMAL
WHOLE BLOOD HOLD SPECIMEN: NORMAL

## 2024-02-17 PROCEDURE — 80053 COMPREHEN METABOLIC PANEL: CPT | Performed by: EMERGENCY MEDICINE

## 2024-02-17 PROCEDURE — 85025 COMPLETE CBC W/AUTO DIFF WBC: CPT | Performed by: EMERGENCY MEDICINE

## 2024-02-17 PROCEDURE — 87637 SARSCOV2&INF A&B&RSV AMP PRB: CPT | Performed by: EMERGENCY MEDICINE

## 2024-02-17 PROCEDURE — 99284 EMERGENCY DEPT VISIT MOD MDM: CPT

## 2024-02-17 PROCEDURE — 84484 ASSAY OF TROPONIN QUANT: CPT | Performed by: EMERGENCY MEDICINE

## 2024-02-17 PROCEDURE — 93005 ELECTROCARDIOGRAM TRACING: CPT | Performed by: EMERGENCY MEDICINE

## 2024-02-17 PROCEDURE — 71045 X-RAY EXAM CHEST 1 VIEW: CPT

## 2024-02-17 PROCEDURE — 83880 ASSAY OF NATRIURETIC PEPTIDE: CPT | Performed by: EMERGENCY MEDICINE

## 2024-02-17 RX ORDER — SODIUM CHLORIDE 0.9 % (FLUSH) 0.9 %
10 SYRINGE (ML) INJECTION AS NEEDED
Status: DISCONTINUED | OUTPATIENT
Start: 2024-02-17 | End: 2024-02-17 | Stop reason: HOSPADM

## 2024-02-17 NOTE — ED PROVIDER NOTES
Time: 11:59 AM EST  Date of encounter:  2/17/2024  Independent Historian/Clinical History and Information was obtained by:   Patient and Family    History is limited by: Dementia    Chief Complaint: Shortness of air        History of Present Illness:  Patient is a 78 y.o. year old adult who presents to the emergency department for evaluation of shortness of air today presenting from local assisted living facility here in town.    She is oxygenating well at 98%-100% on room air and denies any shortness of breath now.  She also denies any chest pain or abdominal pain now, but her family member at bedside reports she had some epigastric pain rating upwards and has a history of hiatal hernia.    Nursing staff report that the staff at her facility mention she just tested positive for COVID-19.  No cough or congestion or fevers reported by patient or family member.    HPI    Patient Care Team  Primary Care Provider: Ej Santoyo MD    Past Medical History:     Allergies   Allergen Reactions    Sulfa Antibiotics Itching     Past Medical History:   Diagnosis Date    Acute renal failure, unspecified acute renal failure type 06/28/2023    Anemia     Arthritis     Arthritis 02/22/2023    Back pain with radiation 01/24/2013    Breast lump     Breast lump 02/22/2023    Cancer     Cancer of right breast 02/2012    had lumpectomy, undergoing treatment at Dr. Campoverde's every Tuesday for 6 months, then radiation    Difficulty walking     Foot pain, right     Fracture, clavicle 1956    Hemorrhoids     Hip arthrosis 2022    Hypertension 03/2023    Hypothyroidism 01/24/2013    IC (interstitial cystitis)     Ingrowing toenail     Microscopic hematuria     Mood disorder     Plantar fasciitis     PONV (postoperative nausea and vomiting)     possibly    Rectal bleeding     Urinary tract infection 07/2023    Vitamin D deficiency      Past Surgical History:   Procedure Laterality Date    APPENDECTOMY      BREAST BIOPSY      right side     BREAST LUMPECTOMY Bilateral     BREAST SURGERY      COLONOSCOPY      CYST REMOVAL  1957    removed form coccyx    CYSTOSCOPY      EYE SURGERY      lasix    HARDWARE REMOVAL      PORTACATH PLACEMENT      TOENAIL EXCISION      TONSILLECTOMY      TUBAL ABDOMINAL LIGATION  1978     Family History   Problem Relation Age of Onset    Lung cancer Mother     Brain cancer Mother     Diabetes Mother         due to Graves Disease    Throat cancer Mother     Arthritis Mother     Cancer Mother         Lung Cancer    Thyroid disease Mother         Graves Disease    Hypertension Mother     Heart disease Father     Breast cancer Father         40s    Arthritis Father     Cancer Father         Breast Cancer    Rheumatologic disease Father         arthris in hands    Other Other         heart surgery in 2005    Alzheimer's disease Other     Graves' disease Other     Diabetes Maternal Aunt         unspecified type    Diabetes Maternal Uncle         unspecified type    Kidney nephrosis Maternal Uncle        Home Medications:  Prior to Admission medications    Medication Sig Start Date End Date Taking? Authorizing Provider   acetaminophen (TYLENOL) 325 MG tablet Take 2 tablets by mouth Every 6 (Six) Hours As Needed for Mild Pain.    ProviderAlicia MD   ammonium lactate (AmLactin) 12 % lotion Apply  topically to the appropriate area as directed 2 (Two) Times a Day. 2/22/23   Henry Sifuentes DPM   famotidine (PEPCID) 40 MG tablet TAKE 1 TABLET BY MOUTH DAILY 12/4/23   Ej Santoyo MD   furosemide (LASIX) 40 MG tablet TAKE 1 TABLET BY MOUTH TWICE DAILY AS NEEDED FOR SWELLING 10/16/23   Ej Santoyo MD   levothyroxine (SYNTHROID, LEVOTHROID) 100 MCG tablet TAKE 1 TABLET(100 MCG) BY MOUTH EVERY DAY 10/23/23   Ej Santoyo MD   lisinopril (PRINIVIL,ZESTRIL) 10 MG tablet Take 1 tablet by mouth Daily. 11/22/23   Alicia Hogan MD   magnesium hydroxide (MILK OF MAGNESIA) 400 MG/5ML suspension Take  by mouth Daily As  "Needed for Constipation.    Alicia Hogan MD   omeprazole (priLOSEC) 40 MG capsule  23   Alicia Hogan MD   QUEtiapine (SEROquel) 25 MG tablet TAKE 1 TABLET BY MOUTH EVERY NIGHT 23   Ej Santoyo MD   sodium bicarbonate 325 MG tablet TAKE 1 TABLET BY MOUTH TWICE DAILY 24   Ej Santoyo MD   traMADol (ULTRAM) 50 MG tablet Take 1 tablet by mouth Every 8 (Eight) Hours As Needed for Moderate Pain. 23   Alicia Hogan MD        Social History:   Social History     Tobacco Use    Smoking status: Former     Years: 60     Types: Cigarettes     Start date: 1960     Quit date: 2022     Years since quittin.6     Passive exposure: Past    Smokeless tobacco: Never    Tobacco comments:     quit at age 59, second hand smoke exposure- never   Vaping Use    Vaping Use: Never used   Substance Use Topics    Alcohol use: Not Currently     Comment: light    Drug use: Never         Review of Systems:  Review of Systems   I performed a 10 point review of systems which was all negative, except for the positives found in the HPI above.      Physical Exam:  /60   Pulse 73   Temp 98.2 °F (36.8 °C) (Oral)   Resp 18   Ht 172.7 cm (67.99\")   SpO2 100%   BMI 34.34 kg/m²       Physical Exam   General: Awake alert and in no obvious distress    HEENT: Head normocephalic atraumatic, eyes PERRLA EOMI, nose normal, oropharynx normal.    Neck: Supple full range of motion, no meningismus, no lymphadenopathy    Heart: Regular rate and rhythm, no murmurs or rubs, 2+ radial pulses bilaterally    Lungs: Clear to auscultation bilaterally without wheezes or crackles, no respiratory distress    Abdomen: Soft, nontender, nondistended, no rebound or guarding    Skin: Warm, dry, no rash    Musculoskeletal: Normal range of motion, 3+ chronic pitting bilateral symmetric lower extremity edema without calf tenderness    Neurologic: Oriented x3, no motor deficits no sensory deficits    Psychiatric: " Mood appears stable, no psychosis            Procedures:  Procedures      Medical Decision Making:      Comorbidities that affect care:    Chronic Kidney Disease    External Notes reviewed:    Previous Labs: I reviewed her previous baseline labs which show baseline renal function and creatinine of about 1.4-1.6.      The following orders were placed and all results were independently analyzed by me:  Orders Placed This Encounter   Procedures    COVID PRE-OP / PRE-PROCEDURE SCREENING ORDER (NO ISOLATION) - Swab, Nasopharynx    COVID-19, FLU A/B, RSV PCR 1 HR TAT - Swab, Nasopharynx    XR Chest 1 View    Comprehensive Metabolic Panel    BNP    Single High Sensitivity Troponin T    CBC Auto Differential    [place] Draw    NPO Diet NPO Type: Strict NPO    Undress & Gown    Continuous Pulse Oximetry    Vital Signs    Oxygen Therapy- Nasal Cannula; Titrate 1-6 LPM Per SpO2; 90 - 95%    ECG 12 Lead ED Triage Standing Order; SOA    Insert Peripheral IV    Insert Peripheral IV    CBC & Differential    Green Top (Gel)    Lavender Top    Gold Top - SST    Light Blue Top       Medications Given in the Emergency Department:  Medications   sodium chloride 0.9 % flush 10 mL (has no administration in time range)   sodium chloride 0.9 % flush 10 mL (has no administration in time range)        ED Course:    ED Course as of 02/17/24 1427   Sat Feb 17, 2024   1201 EKG: I interpreted her twelve-lead EKG as normal sinus rhythm at 75 bpm, normal P waves, QRS showing septal and inferior Q waves present, low voltage, normal ST segments and T waves; no acute ischemia or ectopy. [VS]      ED Course User Index  [VS] Arthur Jeong MD       Labs:    Lab Results (last 24 hours)       Procedure Component Value Units Date/Time    COVID PRE-OP / PRE-PROCEDURE SCREENING ORDER (NO ISOLATION) - Swab, Nasopharynx [[id number]]  (Abnormal) Collected: 02/17/24 1226    Specimen: Swab from Nasopharynx Updated: 02/17/24 1312    Narrative:      The following  orders were created for panel order COVID PRE-OP / PRE-PROCEDURE SCREENING ORDER (NO ISOLATION) - Swab, Nasopharynx.  Procedure                               Abnormality         Status                     ---------                               -----------         ------                     COVID-19, FLU A/B, RSV P...[[id number]]  Abnormal            Final result                 Please view results for these tests on the individual orders.    COVID-19, FLU A/B, RSV PCR 1 HR TAT - Swab, Nasopharynx [[id number]]  (Abnormal) Collected: 02/17/24 1226    Specimen: Swab from Nasopharynx Updated: 02/17/24 1312     COVID19 Detected     Influenza A PCR Not Detected     Influenza B PCR Not Detected     RSV, PCR Not Detected    Narrative:      Fact sheet for providers: https://www.fda.gov/media/210541/download    Fact sheet for patients: https://www.fda.gov/media/342556/download    Test performed by PCR.    CBC & Differential [[id number]]  (Abnormal) Collected: 02/17/24 1325    Specimen: Blood Updated: 02/17/24 1332    Narrative:      The following orders were created for panel order CBC & Differential.  Procedure                               Abnormality         Status                     ---------                               -----------         ------                     CBC Auto Differential[[id number]]        Abnormal            Final result                 Please view results for these tests on the individual orders.    Comprehensive Metabolic Panel [[id number]]  (Abnormal) Collected: 02/17/24 1325    Specimen: Blood Updated: 02/17/24 1355     Glucose 114 mg/dL      BUN 19 mg/dL      Creatinine 1.27 mg/dL      Sodium 136 mmol/L      Potassium 3.4 mmol/L      Chloride 94 mmol/L      CO2 27.3 mmol/L      Calcium 9.5 mg/dL      Total Protein 7.3 g/dL      Albumin 4.3 g/dL      ALT (SGPT) 21 U/L      AST (SGOT) 23 U/L      Alkaline Phosphatase 70 U/L      Total Bilirubin 0.6 mg/dL      Globulin 3.0 gm/dL      A/G Ratio 1.4 g/dL       BUN/Creatinine Ratio 15.0     Anion Gap 14.7 mmol/L      eGFR 43.1 mL/min/1.73     Narrative:      GFR Normal >60  Chronic Kidney Disease <60  Kidney Failure <15    The GFR formula is only valid for adults with stable renal function between ages 18 and 70.    BNP [[id number]]  (Normal) Collected: 02/17/24 1325    Specimen: Blood Updated: 02/17/24 1350     proBNP 163.4 pg/mL     Narrative:      This assay is used as an aid in the diagnosis of individuals suspected of having heart failure. It can be used as an aid in the diagnosis of acute decompensated heart failure (ADHF) in patients presenting with signs and symptoms of ADHF to the emergency department (ED). In addition, NT-proBNP of <300 pg/mL indicates ADHF is not likely.    Age Range Result Interpretation  NT-proBNP Concentration (pg/mL:      <50             Positive            >450                   Gray                 300-450                    Negative             <300    50-75           Positive            >900                  Gray                300-900                  Negative            <300      >75             Positive            >1800                  Gray                300-1800                  Negative            <300    Single High Sensitivity Troponin T [[id number]]  (Abnormal) Collected: 02/17/24 1325    Specimen: Blood Updated: 02/17/24 1355     HS Troponin T 20 ng/L     Narrative:      High Sensitive Troponin T Reference Range:  <14.0 ng/L- Negative Female for AMI  <22.0 ng/L- Negative Male for AMI  >=14 - Abnormal Female indicating possible myocardial injury.  >=22 - Abnormal Male indicating possible myocardial injury.   Clinicians would have to utilize clinical acumen, EKG, Troponin, and serial changes to determine if it is an Acute Myocardial Infarction or myocardial injury due to an underlying chronic condition.         CBC Auto Differential [[id number]]  (Abnormal) Collected: 02/17/24 1325    Specimen: Blood Updated: 02/17/24 9906      WBC 6.23 10*3/mm3      RBC 3.92 10*6/mm3      Hemoglobin 11.4 g/dL      Hematocrit 35.8 %      MCV 91.3 fL      MCH 29.1 pg      MCHC 31.8 g/dL      RDW 14.7 %      RDW-SD 49.2 fl      MPV 11.1 fL      Platelets 192 10*3/mm3      Neutrophil % 72.4 %      Lymphocyte % 15.4 %      Monocyte % 11.4 %      Eosinophil % 0.2 %      Basophil % 0.3 %      Immature Grans % 0.3 %      Neutrophils, Absolute 4.51 10*3/mm3      Lymphocytes, Absolute 0.96 10*3/mm3      Monocytes, Absolute 0.71 10*3/mm3      Eosinophils, Absolute 0.01 10*3/mm3      Basophils, Absolute 0.02 10*3/mm3      Immature Grans, Absolute 0.02 10*3/mm3      nRBC 0.0 /100 WBC              Imaging:    XR Chest 1 View    Result Date: 2/17/2024  PROCEDURE: XR CHEST 1 VW  COMPARISON: [facility], , XR CHEST 1 VW, 6/28/2023, 12:32.  INDICATIONS: SHORTNESS OF BREATH, EDEMA TODAY; COVID +  FINDINGS:  No infiltrate or effusion is identified.  The cardiac and mediastinal silhouettes appear normal.  Surgical clips are noted in the right axilla.        1. No acute cardiopulmonary disease.       Fermin Asher M.D.       Electronically Signed and Approved By: Fermin Asher M.D. on 2/17/2024 at 12:38                Differential Diagnosis and Discussion:    Dyspnea: Differential diagnosis includes but is not limited to metabolic acidosis, neurological disorders, psychogenic, asthma, pneumothorax, upper airway obstruction, COPD, pneumonia, noncardiogenic pulmonary edema, interstitial lung disease, anemia, congestive heart failure, and pulmonary embolism    All labs were reviewed and interpreted by me.  All X-rays impressions were independently interpreted by me.  EKG was interpreted by me.    MDM     Amount and/or Complexity of Data Reviewed  Clinical lab tests: reviewed  Tests in the radiology section of CPT®: reviewed  Tests in the medicine section of CPT®: reviewed  Decide to obtain previous medical records or to obtain history from someone other than the  patient: yes           This patient is a pleasant 78-year-old female with some history of dementia now presenting from local assisted living facility for complaint of dyspnea and recently testing positive for COVID-19.    On my exam she is oxygenating at 100% on room air with clear lung fields and no dyspnea or increased work of breathing.    However she does have some chronic kidney disease and chronic lower extremity edema so I considered possibility of volume overload or CHF flare.    I am checking lab work including proBNP and getting chest x-ray and EKG.    Her proBNP came back normal and chest x-ray normal and EKG shows no acute findings.    She is sitting here in no distress with normal vitals and oxygenating 100% on room air and I do not see any indication to admit her or any acute findings on her labs.    I think she can be safely discharged back to her nursing facility with COVID-19 supportive care instructions.                Patient Care Considerations:          Consultants/Shared Management Plan:        Social Determinants of Health:    Patient has presented with family members who are responsible, reliable and will ensure follow up care.      Disposition and Care Coordination:    Discharged: I considered escalation of care by admitting this patient to the hospital, however the patient has improved and is suitable and  stable for discharge.    I have explained the patient´s condition, diagnoses and treatment plan based on the information available to me at this time. I have answered questions and addressed any concerns. The patient has a good  understanding of the patient´s diagnosis, condition, and treatment plan as can be expected at this point. The vital signs have been stable. The patient´s condition is stable and appropriate for discharge from the emergency department.      The patient will pursue further outpatient evaluation with the primary care physician or other designated or consulting  physician as outlined in the discharge instructions. They are agreeable to this plan of care and follow-up instructions have been explained in detail. The patient has received these instructions in written format and has expressed an understanding of the discharge instructions. The patient is aware that any significant change in condition or worsening of symptoms should prompt an immediate return to this or the closest emergency department or call to 911.  I have explained discharge medications and the need for follow up with the patient/caretakers. This was also printed in the discharge instructions. Patient was discharged with the following medications and follow up:      Medication List      No changes were made to your prescriptions during this visit.      Ej Santoyo MD  [address] [facility] [address]  [place] [address]  [phone]    Call in 2 days  As needed, If symptoms worsen, for a follow-up appointment       Final diagnoses:   Shortness of breath   COVID-19 virus infection        ED Disposition       ED Disposition   Discharge    Condition   Stable    Comment   --               This medical record created using voice recognition software.             Arthur Jeong MD  02/17/24 [id number]

## 2024-02-17 NOTE — DISCHARGE INSTRUCTIONS
Tested positive for COVID-19, but her blood work appears to be at baseline and your chest x-ray is normal without pneumonia, and oxygen 100%.

## 2024-03-07 RX ORDER — SODIUM BICARBONATE 325 MG/1
TABLET ORAL
Qty: 60 TABLET | Refills: 0 | Status: SHIPPED | OUTPATIENT
Start: 2024-03-07

## 2024-03-10 LAB
QT INTERVAL: 406 MS
QTC INTERVAL: 453 MS

## 2024-03-22 ENCOUNTER — OFFICE VISIT (OUTPATIENT)
Dept: FAMILY MEDICINE CLINIC | Facility: CLINIC | Age: 79
End: 2024-03-22
Payer: MEDICARE

## 2024-03-22 VITALS
DIASTOLIC BLOOD PRESSURE: 64 MMHG | OXYGEN SATURATION: 98 % | HEART RATE: 68 BPM | TEMPERATURE: 98 F | BODY MASS INDEX: 35.31 KG/M2 | RESPIRATION RATE: 16 BRPM | HEIGHT: 68 IN | WEIGHT: 233 LBS | SYSTOLIC BLOOD PRESSURE: 126 MMHG

## 2024-03-22 DIAGNOSIS — Z00.00 MEDICARE ANNUAL WELLNESS VISIT, SUBSEQUENT: Primary | ICD-10-CM

## 2024-03-22 DIAGNOSIS — I10 PRIMARY HYPERTENSION: ICD-10-CM

## 2024-03-22 DIAGNOSIS — R73.01 IMPAIRED FASTING BLOOD SUGAR: ICD-10-CM

## 2024-03-22 DIAGNOSIS — Z11.59 NEED FOR HEPATITIS C SCREENING TEST: ICD-10-CM

## 2024-03-22 DIAGNOSIS — M79.89 LEG SWELLING: ICD-10-CM

## 2024-03-22 DIAGNOSIS — E03.9 ACQUIRED HYPOTHYROIDISM: ICD-10-CM

## 2024-03-22 DIAGNOSIS — N18.32 STAGE 3B CHRONIC KIDNEY DISEASE: ICD-10-CM

## 2024-03-22 NOTE — PROGRESS NOTES
The ABCs of the Annual Wellness Visit  Subsequent Medicare Wellness Visit    Mary Simmons is a 78 y.o. adult who presents for a Subsequent Medicare Wellness Visit.    The following portions of the patient's history were reviewed and   updated as appropriate: allergies, current medications, past family history, past medical history, past social history, past surgical history, and problem list.    Compared to one year ago, the patient feels Sue's physical   health is better.    Compared to one year ago, the patient feels Sue's mental   health is the same.    Recent Hospitalizations:  This patient has had a [facility] admission record on file within the last 365 days.    Current Medical Providers:  Patient Care Team:  Ej Santoyo MD as PCP - General (Family Medicine)  Isabell Santana APRN as Nurse Practitioner (Urology)  Haile Garcia (Nephrologist)    Outpatient Medications Prior to Visit   Medication Sig Dispense Refill    acetaminophen (TYLENOL) 325 MG tablet Take 2 tablets by mouth Every 6 (Six) Hours As Needed for Mild Pain.      ammonium lactate (AmLactin) 12 % lotion Apply  topically to the appropriate area as directed 2 (Two) Times a Day. 225 g 11    famotidine (PEPCID) 40 MG tablet TAKE 1 TABLET BY MOUTH DAILY 90 tablet 1    furosemide (LASIX) 40 MG tablet TAKE 1 TABLET BY MOUTH TWICE DAILY AS NEEDED FOR SWELLING 180 tablet 1    levothyroxine (SYNTHROID, LEVOTHROID) 100 MCG tablet TAKE 1 TABLET(100 MCG) BY MOUTH EVERY DAY 90 tablet 1    lisinopril (PRINIVIL,ZESTRIL) 10 MG tablet Take 1 tablet by mouth Daily.      magnesium hydroxide (MILK OF MAGNESIA) 400 MG/5ML suspension Take  by mouth Daily As Needed for Constipation.      omeprazole (priLOSEC) 40 MG capsule       QUEtiapine (SEROquel) 25 MG tablet TAKE 1 TABLET BY MOUTH EVERY NIGHT 90 tablet 1    sodium bicarbonate 325 MG tablet TAKE 1 TABLET BY MOUTH TWICE DAILY 60 tablet 0    traMADol (ULTRAM) 50 MG tablet Take 1  tablet by mouth Every 8 (Eight) Hours As Needed for Moderate Pain. (Patient not taking: Reported on 3/22/2024)       No facility-administered medications prior to visit.       Opioid medication/s are on active medication list.  and I have evaluated Sue's active treatment plan and pain score trends (see table).  Vitals:    03/22/24 1450   PainSc: 0-No pain     I have reviewed the chart for potential of high risk medication and harmful drug interactions in the elderly.          Aspirin is not on active medication list.  Aspirin use is not indicated based on review of current medical condition/s. Risk of harm outweighs potential benefits.  .    Patient Active Problem List   Diagnosis    Acquired hypothyroidism    Right hip pain    Stage 3 chronic kidney disease    History of breast cancer    Anemia    Arthritis    Back pain with radiation    Breast lump    Cancer    Cancer of right breast    Family history of coronary artery disease    Family history of lung cancer    Family history of thyroid disease    Foot pain, right    Hematuria, microscopic    Hemorrhoids    IC (interstitial cystitis)    Ingrown toenail    Mood disorder    Rectal bleeding    Varicose veins of lower extremities with inflammation    Venous insufficiency (chronic) (peripheral)    Vitamin D deficiency    Trochanteric bursitis of right hip    Osteoarthritis of both hips    Right knee pain    Primary osteoarthritis of right knee    Acute renal failure, unspecified acute renal failure type    Acute kidney injury    Benign hypertension with chronic kidney disease    Gastroesophageal reflux disease    Other retention of urine     Advance Care Planning   Advance Care Planning     Advance Directive is on file.  ACP discussion was held with the patient during this visit. Patient has an advance directive in EMR which is still valid.      Objective    Vitals:    03/22/24 1450   BP: 126/64   Pulse: 68   Resp: 16   Temp: 98 °F (36.7 °C)   SpO2: 98%   Weight:  "106 kg (233 lb)   Height: 172.7 cm (68\")   PainSc: 0-No pain     Estimated body mass index is 35.43 kg/m² as calculated from the following:    Height as of this encounter: 172.7 cm (68\").    Weight as of this encounter: 106 kg (233 lb).           Does the patient have evidence of cognitive impairment?   Yes: Continue current medications.            HEALTH RISK ASSESSMENT    Smoking Status:  Social History     Tobacco Use   Smoking Status Former    Current packs/day: 0.00    Types: Cigarettes    Start date: 1960    Quit date: 2022    Years since quittin.7    Passive exposure: Past   Smokeless Tobacco Never   Tobacco Comments    quit at age 59, second hand smoke exposure- never     Alcohol Consumption:  Social History     Substance and Sexual Activity   Alcohol Use Not Currently    Comment: light     Fall Risk Screen:    ANA PAULA Fall Risk Assessment was completed, and patient is at MODERATE risk for falls. Assessment completed on:3/22/2024    Depression Screening:      3/22/2024     2:58 PM   PHQ-2/PHQ-9 Depression Screening   Little Interest or Pleasure in Doing Things 0-->not at all   Feeling Down, Depressed or Hopeless 0-->not at all   PHQ-9: Brief Depression Severity Measure Score 0       Health Habits and Functional and Cognitive Screening:      3/22/2024     2:58 PM   Functional & Cognitive Status   Do you have difficulty preparing food and eating? Yes   Do you have difficulty bathing yourself, getting dressed or grooming yourself? Yes   Do you have difficulty using the toilet? No   Do you have difficulty moving around from place to place? No   Do you have trouble with steps or getting out of a bed or a chair? No   Current Diet Well Balanced Diet   Dental Exam Not up to date   Eye Exam Not up to date   Exercise (times per week) 4 times per week   Current Exercises Include Other   Do you need help using the phone?  No   Are you deaf or do you have serious difficulty hearing?  No   Do you need help to " go to places out of walking distance? Yes   Do you need help shopping? Yes   Do you need help preparing meals?  Yes   Do you need help with housework?  Yes   Do you need help with laundry? Yes   Do you need help taking your medications? Yes   Do you need help managing money? Yes   Do you ever drive or ride in a car without wearing a seat belt? No   Have you felt unusual stress, anger or loneliness in the last month? No   Who do you live with? Community   If you need help, do you have trouble finding someone available to you? No   Have you been bothered in the last four weeks by sexual problems? No   Do you have difficulty concentrating, remembering or making decisions? Yes       Age-appropriate Screening Schedule:  Refer to the list below for future screening recommendations based on patient's age, sex and/or medical conditions. Orders for these recommended tests are listed in the plan section. The patient has been provided with a written plan.    Health Maintenance   Topic Date Due    HEPATITIS C SCREENING  Never done    MAMMOGRAM  03/07/2023    LIPID PANEL  02/17/2024    DXA SCAN  03/07/2024    ZOSTER VACCINE (2 of 2) 03/22/2024 (Originally 11/22/2021)    COVID-19 Vaccine (4 - 2023-24 season) 03/24/2024 (Originally 9/1/2023)    INFLUENZA VACCINE  03/31/2024 (Originally 8/1/2023)    RSV Vaccine - Adults (1 - 1-dose 60+ series) 03/22/2025 (Originally 8/17/2004)    BMI FOLLOWUP  03/31/2024    ANNUAL WELLNESS VISIT  03/22/2025    TDAP/TD VACCINES (2 - Td or Tdap) 11/07/2027    Pneumococcal Vaccine 65+  Completed                  CMS Preventative Services Quick Reference  Risk Factors Identified During Encounter:    Immunizations Discussed/Encouraged: RSV (Respiratory Syncytial Virus)  Dental Screening Recommended  Vision Screening Recommended    The above risks/problems have been discussed with the patient.  Pertinent information has been shared with the patient in the After Visit Summary.    Diagnoses and all orders  for this visit:    1. Medicare annual wellness visit, subsequent (Primary)  -     TSH Rfx On Abnormal To Free T4; Future  -     CBC & Differential; Future  -     Comprehensive Metabolic Panel; Future  -     Hemoglobin A1c; Future  -     Lipid Panel; Future  -     Urinalysis With Microscopic - Urine, Clean Catch; Future    2. Stage 3b chronic kidney disease  -     TSH Rfx On Abnormal To Free T4; Future  -     CBC & Differential; Future  -     Comprehensive Metabolic Panel; Future  -     Hemoglobin A1c; Future  -     Lipid Panel; Future  -     Urinalysis With Microscopic - Urine, Clean Catch; Future    3. Primary hypertension  -     TSH Rfx On Abnormal To Free T4; Future  -     CBC & Differential; Future  -     Comprehensive Metabolic Panel; Future  -     Hemoglobin A1c; Future  -     Lipid Panel; Future  -     Urinalysis With Microscopic - Urine, Clean Catch; Future    4. Leg swelling  -     TSH Rfx On Abnormal To Free T4; Future  -     CBC & Differential; Future  -     Comprehensive Metabolic Panel; Future  -     Hemoglobin A1c; Future  -     Lipid Panel; Future  -     Urinalysis With Microscopic - Urine, Clean Catch; Future    5. Acquired hypothyroidism  -     TSH Rfx On Abnormal To Free T4; Future  -     CBC & Differential; Future  -     Comprehensive Metabolic Panel; Future  -     Hemoglobin A1c; Future  -     Lipid Panel; Future  -     Urinalysis With Microscopic - Urine, Clean Catch; Future    6. Need for hepatitis C screening test  -     Hepatitis C Antibody; Future  -     TSH Rfx On Abnormal To Free T4; Future  -     CBC & Differential; Future  -     Comprehensive Metabolic Panel; Future  -     Hemoglobin A1c; Future  -     Lipid Panel; Future  -     Urinalysis With Microscopic - Urine, Clean Catch; Future    7. Impaired fasting blood sugar  -     TSH Rfx On Abnormal To Free T4; Future  -     CBC & Differential; Future  -     Comprehensive Metabolic Panel; Future  -     Hemoglobin A1c; Future  -     Lipid  Panel; Future  -     Urinalysis With Microscopic - Urine, Clean Catch; Future        Follow Up:   Next Medicare Wellness visit to be scheduled in 1 year.      An After Visit Summary and PPPS were made available to the patient.

## 2024-03-27 DIAGNOSIS — R60.1 ANASARCA: ICD-10-CM

## 2024-03-27 DIAGNOSIS — M79.89 LEG SWELLING: ICD-10-CM

## 2024-03-27 DIAGNOSIS — L74.4 ANHIDROSIS: ICD-10-CM

## 2024-03-27 DIAGNOSIS — M79.89 SWELLING OF LOWER EXTREMITY: ICD-10-CM

## 2024-03-27 RX ORDER — AMMONIUM LACTATE 12 G/100G
LOTION TOPICAL SEE ADMIN INSTRUCTIONS
Qty: 225 G | Refills: 11 | Status: SHIPPED | OUTPATIENT
Start: 2024-03-27

## 2024-03-27 RX ORDER — LISINOPRIL 10 MG/1
TABLET ORAL DAILY
Qty: 30 TABLET | Refills: 3 | Status: SHIPPED | OUTPATIENT
Start: 2024-03-27

## 2024-03-27 RX ORDER — QUETIAPINE FUMARATE 25 MG/1
25 TABLET, FILM COATED ORAL NIGHTLY
Qty: 90 TABLET | Refills: 1 | Status: SHIPPED | OUTPATIENT
Start: 2024-03-27

## 2024-03-27 RX ORDER — FUROSEMIDE 40 MG/1
TABLET ORAL
Qty: 180 TABLET | Refills: 1 | Status: SHIPPED | OUTPATIENT
Start: 2024-03-27

## 2024-03-27 RX ORDER — LEVOTHYROXINE SODIUM 0.1 MG/1
TABLET ORAL
Qty: 90 TABLET | Refills: 1 | Status: SHIPPED | OUTPATIENT
Start: 2024-03-27

## 2024-04-11 RX ORDER — SODIUM BICARBONATE 325 MG/1
TABLET ORAL
Qty: 60 TABLET | Refills: 0 | Status: SHIPPED | OUTPATIENT
Start: 2024-04-11

## 2024-05-10 RX ORDER — SODIUM BICARBONATE 325 MG/1
TABLET ORAL
Qty: 60 TABLET | Refills: 0 | Status: SHIPPED | OUTPATIENT
Start: 2024-05-10

## 2024-05-31 PROCEDURE — 99284 EMERGENCY DEPT VISIT MOD MDM: CPT

## 2024-06-01 ENCOUNTER — APPOINTMENT (OUTPATIENT)
Dept: CT IMAGING | Facility: HOSPITAL | Age: 79
End: 2024-06-01
Payer: MEDICARE

## 2024-06-01 ENCOUNTER — HOSPITAL ENCOUNTER (EMERGENCY)
Facility: HOSPITAL | Age: 79
Discharge: HOME | End: 2024-06-01
Attending: STUDENT IN AN ORGANIZED HEALTH CARE EDUCATION/TRAINING PROGRAM
Payer: MEDICARE

## 2024-06-01 ENCOUNTER — APPOINTMENT (OUTPATIENT)
Dept: GENERAL RADIOLOGY | Facility: HOSPITAL | Age: 79
End: 2024-06-01
Payer: MEDICARE

## 2024-06-01 VITALS
OXYGEN SATURATION: 96 % | HEART RATE: 65 BPM | TEMPERATURE: 97.4 F | BODY MASS INDEX: 35.61 KG/M2 | SYSTOLIC BLOOD PRESSURE: 138 MMHG | DIASTOLIC BLOOD PRESSURE: 60 MMHG | WEIGHT: 235 LBS | RESPIRATION RATE: 16 BRPM | HEIGHT: 68 IN

## 2024-06-01 DIAGNOSIS — S79.911A INJURY OF RIGHT HIP, INITIAL ENCOUNTER: ICD-10-CM
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
## 2024-06-01 DIAGNOSIS — K21.9 GASTROESOPHAGEAL REFLUX DISEASE WITHOUT ESOPHAGITIS: ICD-10-CM

## 2024-06-01 DIAGNOSIS — S09.90XA INJURY OF HEAD, INITIAL ENCOUNTER: ICD-10-CM

## 2024-06-01 PROCEDURE — 73502 X-RAY EXAM HIP UNI 2-3 VIEWS: CPT

## 2024-06-01 PROCEDURE — 72125 CT NECK SPINE W/O DYE: CPT

## 2024-06-01 PROCEDURE — 70450 CT HEAD/BRAIN W/O DYE: CPT

## 2024-06-01 NOTE — DISCHARGE INSTRUCTIONS
Please follow-up with your primary care physician in 1 to 2 weeks as needed    Please return to the ER with new or worsening symptoms including but not limited to uncontrolled pain, confusion, changes in mental status, inability to ambulate.

## 2024-06-01 NOTE — ED PROVIDER NOTES
EMERGENCY DEPARTMENT ENCOUNTER  Room Number:  11/11  PCP: Ej Santoyo MD  Independent Historians: Patient and Family      HPI:  Chief Complaint: had concerns including Fall and Hip Pain.     A complete HPI/ROS/PMH/PSH/SH/FH are unobtainable due to: Dementia    Context: Sue Simmons is a 78 y.o. female with a medical history of CKD, hypothyroid, hypertension who presents to the ED c/o acute injury sustained during fall.  Patient was sleeping and she had a bad dream which led to patient rolling out of bed.  Patient complaining of right hip pain.  Patient is not on blood thinners.  Upon my evaluation patient has no complaints of pain at this time.      Review of prior external notes (non-ED) -and- Review of prior external test results outside of this encounter: Office visit with family medicine from 3/22/2024 reviewed and notable for visit secondary to hypertension, annual wellness, CKD, leg swelling, hypothyroid.  Plan at that visit was to obtain CBC, CMP, A1c, lipid panel, urinalysis and TSH.    Prescription drug monitoring program review:         PAST MEDICAL HISTORY  Active Ambulatory Problems     Diagnosis Date Noted    Acquired hypothyroidism 02/15/2022    Right hip pain 02/15/2022    Stage 3 chronic kidney disease 02/15/2022    History of breast cancer 02/15/2022    Anemia 02/22/2023    Arthritis 02/22/2023    Back pain with radiation 01/24/2013    Breast lump 02/22/2023    Cancer 02/22/2023    Cancer of right breast 02/22/2023    Family history of coronary artery disease 02/22/2023    Family history of lung cancer 02/22/2023    Family history of thyroid disease 02/22/2023    Foot pain, right 02/22/2023    Hematuria, microscopic 02/22/2023    Hemorrhoids 02/22/2023    IC (interstitial cystitis) 02/22/2023    Ingrown toenail 02/22/2023    Mood disorder 02/22/2023    Rectal bleeding 02/22/2023    Varicose veins of lower extremities with inflammation 05/07/2014    Venous insufficiency (chronic) (peripheral)  05/07/2014    Vitamin D deficiency 02/22/2023    Trochanteric bursitis of right hip 06/27/2023    Osteoarthritis of both hips 06/27/2023    Right knee pain 06/27/2023    Primary osteoarthritis of right knee 06/27/2023    Acute renal failure, unspecified acute renal failure type 06/28/2023    Acute kidney injury 08/20/2023    Benign hypertension with chronic kidney disease 08/20/2023    Gastroesophageal reflux disease 08/20/2023    Other retention of urine 08/20/2023     Resolved Ambulatory Problems     Diagnosis Date Noted    No Resolved Ambulatory Problems     Past Medical History:   Diagnosis Date    Difficulty walking     Fracture, clavicle 1956    GERD (gastroesophageal reflux disease)     Hip arthrosis 2022    Hypertension 03/2023    Hypothyroidism 01/24/2013    Ingrowing toenail     Low back pain 06/2023    Microscopic hematuria     Plantar fasciitis     PONV (postoperative nausea and vomiting)     Renal insufficiency 06/2023    Urinary tract infection 07/2023         PAST SURGICAL HISTORY  Past Surgical History:   Procedure Laterality Date    APPENDECTOMY      BREAST BIOPSY      right side    BREAST LUMPECTOMY Bilateral     BREAST SURGERY      COLONOSCOPY      CYST REMOVAL  1957    removed form coccyx    CYSTOSCOPY      EYE SURGERY      lasix    HARDWARE REMOVAL      PORTACATH PLACEMENT      TOENAIL EXCISION      TONSILLECTOMY      TUBAL ABDOMINAL LIGATION  1978         FAMILY HISTORY  Family History   Problem Relation Age of Onset    Lung cancer Mother     Brain cancer Mother     Diabetes Mother         due to Graves Disease    Throat cancer Mother     Arthritis Mother     Cancer Mother         Lung Cancer    Thyroid disease Mother         Graves Disease    Hypertension Mother     Heart disease Father     Breast cancer Father         40s    Arthritis Father     Cancer Father         Breast Cancer    Rheumatologic disease Father         arthris in hands    Other Other         heart surgery in 2005     Alzheimer's disease Other     Graves' disease Other     Diabetes Maternal Aunt         unspecified type    Diabetes Maternal Uncle         unspecified type    Kidney nephrosis Maternal Uncle          SOCIAL HISTORY  Social History     Socioeconomic History    Marital status:    Tobacco Use    Smoking status: Former     Current packs/day: 0.00     Types: Cigarettes     Start date: 1960     Quit date: 2022     Years since quittin.9     Passive exposure: Past    Smokeless tobacco: Never    Tobacco comments:     quit at age 59, second hand smoke exposure- never   Vaping Use    Vaping status: Never Used   Substance and Sexual Activity    Alcohol use: Not Currently     Comment: light    Drug use: Never    Sexual activity: Not Currently         ALLERGIES  Sulfa antibiotics      REVIEW OF SYSTEMS  Review of Systems  Included in HPI  All systems reviewed and negative except for those discussed in HPI.      PHYSICAL EXAM    I have reviewed the triage vital signs and nursing notes.    ED Triage Vitals [24 2346]   Temp Heart Rate Resp BP SpO2   97.4 °F (36.3 °C) 78 16 152/67 99 %      Temp src Heart Rate Source Patient Position BP Location FiO2 (%)   -- -- -- -- --       Physical Exam  GENERAL: alert, no acute distress  SKIN: Warm, dry  HENT: Normocephalic, atraumatic  EYES: no scleral icterus  CV: regular rhythm, regular rate  RESPIRATORY: normal effort, lungs clear  ABDOMEN: soft, nontender, nondistended  MUSCULOSKELETAL: no deformity.  Palpated from head to toe including C/T/L spines with no tenderness to palpation identified  NEURO: alert, moves all extremities, follows commands            LAB RESULTS  No results found for this or any previous visit (from the past 24 hour(s)).      RADIOLOGY  XR Hip With or Without Pelvis 2 - 3 View Right    Result Date: 2024  Patient: KELLY EPSTEIN  Time Out: 01:33 Exam(s): XR HIP + PELVIS EXAM:   XR Right Hip With Pelvis When Performed, 2 or 3 Views CLINICAL  HISTORY:    Reason for exam: fall out of bed -- hip pain. TECHNIQUE:   Two or three views of the right hip with pelvis when performed. COMPARISON:   No relevant prior studies available. FINDINGS:   Bones joints: Moderate degenerative joint disease present.  No acute fracture.  No dislocation.   Soft tissues:  Unremarkable. IMPRESSION:     Degenerative joint disease present, but no fracture identified.     Electronically signed by Calin Gandara D.O. on 06-01-24 at 0133    CT Cervical Spine Without Contrast    Result Date: 6/1/2024  Patient: KELLY EPSTEIN  Time Out: 01:15 Exam(s): CT C SPINE EXAM:   CT Cervical Spine Without Intravenous Contrast CLINICAL HISTORY:    Reason for exam: fall out of bed -- head pain. TECHNIQUE:   Axial computed tomography images of the cervical spine without intravenous contrast.  CTDI is 17.94 mGy and DLP is 324.3 mGy-cm.  This CT exam was performed according to the principle of ALARA (As Low As Reasonably Achievable) by using one or more of the following dose reduction techniques: automated exposure control, adjustment of the mA and or kV according to patient size, and or use of iterative reconstruction technique. COMPARISON:   No relevant prior studies available. FINDINGS: The vertebral body heights are maintained. The craniocervical junction is intact. The atlanto-dens interval is maintained. The dens is intact. There is no spondylolisthesis.  Multilevel cervical spondylosis and degenerative disc disease. Straightening of the cervical lordosis. The unenhanced neck soft tissues are grossly unremarkable.  The visualized lung apices are grossly clear. IMPRESSION: No acute fracture or subluxation of the cervical spine.    Electronically signed by Murtaza Moreno MD on 06-01-24 at 0115    CT Head Without Contrast    Result Date: 6/1/2024  Patient: OLIVIER EPSTEINITA  Time Out: 01:15 Exam(s): CT HEAD Without Contrast EXAM:   CT Head Without Intravenous Contrast CLINICAL HISTORY:    Reason for exam:  fall out of bed -- head pain. TECHNIQUE:   Axial computed tomography images of the head brain without intravenous contrast.  CTDI is 55.44 mGy and DLP is 959.5 mGy-cm.  This CT exam was performed according to the principle of ALARA (As Low As Reasonably Achievable) by using one or more of the following dose reduction techniques: automated exposure control, adjustment of the mA and or kV according to patient size, and or use of iterative reconstruction technique. COMPARISON:   No relevant prior studies available. FINDINGS: No acute intracranial hemorrhage.  No midline shift or mass effect. The territorial gray-white matter differentiation is maintained throughout. Age-related cerebral volume loss.  Periventricular and subcortical white matter hypoattenuation, consistent with chronic microangiopathy. The visualized orbits appear grossly unremarkable. The calvarium is intact. The visualized paranasal sinuses and mastoid air cells are grossly clear. IMPRESSION: No acute intracranial hemorrhage, midline shift, or mass effect.     Electronically signed by Murtaza Moreno MD on 06-01-24 at 0115       MEDICATIONS GIVEN IN ER  Medications - No data to display      ORDERS PLACED DURING THIS VISIT:  Orders Placed This Encounter   Procedures    CT Head Without Contrast    CT Cervical Spine Without Contrast    XR Hip With or Without Pelvis 2 - 3 View Right         OUTPATIENT MEDICATION MANAGEMENT:  No current Epic-ordered facility-administered medications on file.     Current Outpatient Medications Ordered in Epic   Medication Sig Dispense Refill    acetaminophen (TYLENOL) 325 MG tablet Take 2 tablets by mouth Every 6 (Six) Hours As Needed for Mild Pain.      ammonium lactate (LAC-HYDRIN) 12 % lotion APPLY TOPICALLY TO THE APPROPRIATE AREA AS DIRECTED TWICE DAILY 225 g 11    famotidine (PEPCID) 40 MG tablet TAKE 1 TABLET BY MOUTH DAILY 90 tablet 1    furosemide (LASIX) 40 MG tablet TAKE 1 TABLET BY MOUTH TWICE DAILY AS NEEDED FOR  SWELLING 180 tablet 1    levothyroxine (SYNTHROID, LEVOTHROID) 100 MCG tablet TAKE 1 TABLET(100 MCG) BY MOUTH EVERY DAY 90 tablet 1    lisinopril (PRINIVIL,ZESTRIL) 10 MG tablet TAKE 1 TABLET BY MOUTH DAILY 30 tablet 3    magnesium hydroxide (MILK OF MAGNESIA) 400 MG/5ML suspension Take  by mouth Daily As Needed for Constipation.      omeprazole (priLOSEC) 40 MG capsule       QUEtiapine (SEROquel) 25 MG tablet TAKE 1 TABLET BY MOUTH EVERY NIGHT 90 tablet 1    sodium bicarbonate 325 MG tablet TAKE 1 TABLET BY MOUTH TWICE DAILY 60 tablet 0    traMADol (ULTRAM) 50 MG tablet Take 1 tablet by mouth Every 8 (Eight) Hours As Needed for Moderate Pain. (Patient not taking: Reported on 3/22/2024)           PROCEDURES  Procedures            PROGRESS, DATA ANALYSIS, CONSULTS, AND MEDICAL DECISION MAKING  All labs have been independently interpreted by me.  All radiology studies have been reviewed by me. All EKG's have been independently viewed and interpreted by me.  Discussion below represents my analysis of pertinent findings related to patient's condition, differential diagnosis, treatment plan and final disposition.    Differential diagnosis includes but is not limited to hip fracture, head injury, contusion.    Clinical Scores:                   ED Course as of 06/01/24 0137   Sat Jun 01, 2024   0031 Hip x-ray interpreted by me and demonstrates no evidence of fracture [MW]   0136 Radiological evaluation is overall unremarkable with no hip fractures or intracranial abnormalities.  Physical exam is reassuring.  Will discharge patient back to her living facility with supportive care instructions and return precautions. [MW]      ED Course User Index  [MW] Espinoza Paredes MD             AS OF 01:37 EDT VITALS:    BP - 138/60  HR - 65  TEMP - 97.4 °F (36.3 °C)  O2 SATS - 96%    COMPLEXITY OF CARE  Admission was considered but after careful review of the patient's presentation, physical examination, diagnostic results, and  response to treatment the patient may be safely discharged with outpatient follow-up.      DIAGNOSIS  Final diagnoses:   Fall, initial encounter   Injury of head, initial encounter   Injury of right hip, initial encounter         DISPOSITION  ED Disposition       ED Disposition   Discharge    Condition   Stable    Comment   --                Please note that portions of this document were completed with a voice recognition program.    Note Disclaimer: At [facility], we believe that sharing information builds trust and better relationships. You are receiving this note because you recently visited [facility]. It is possible you will see health information before a provider has talked with you about it. This kind of information can be easy to misunderstand. To help you fully understand what it means for your health, we urge you to discuss this note with your provider.         Espinoza Paredes MD  06/01/24 0137

## 2024-06-03 RX ORDER — FAMOTIDINE 40 MG/1
40 TABLET, FILM COATED ORAL DAILY
Qty: 90 TABLET | Refills: 1 | Status: SHIPPED | OUTPATIENT
Start: 2024-06-03

## 2024-06-03 NOTE — TELEPHONE ENCOUNTER
UPCOMING APPTS  With Family Medicine (Ej Santoyo MD)  09/19/2024 at 3:00 PM  LAST OFFICE VISIT - THIS DE

## 2025-04-21 ENCOUNTER — APPOINTMENT (OUTPATIENT)
Dept: GENERAL RADIOLOGY | Facility: HOSPITAL | Age: 80
End: 2025-04-21
Payer: MEDICARE

## 2025-04-21 ENCOUNTER — HOSPITAL ENCOUNTER (EMERGENCY)
Facility: HOSPITAL | Age: 80
Discharge: NURSING, REHAB OR LONG-TERM CARE | End: 2025-04-21
Attending: EMERGENCY MEDICINE | Admitting: EMERGENCY MEDICINE
Payer: MEDICARE

## 2025-04-21 VITALS
TEMPERATURE: 97.2 F | RESPIRATION RATE: 18 BRPM | DIASTOLIC BLOOD PRESSURE: 62 MMHG | SYSTOLIC BLOOD PRESSURE: 131 MMHG | OXYGEN SATURATION: 96 % | HEART RATE: 60 BPM

## 2025-04-21 DIAGNOSIS — S80.02XA CONTUSION OF LEFT KNEE, INITIAL ENCOUNTER: Primary | ICD-10-CM

## 2025-04-21 LAB
BILIRUB UR QL STRIP: NEGATIVE
CLARITY UR: CLEAR
COLOR UR: YELLOW
GLUCOSE UR STRIP-MCNC: NEGATIVE MG/DL
HGB UR QL STRIP.AUTO: NEGATIVE
KETONES UR QL STRIP: NEGATIVE
LEUKOCYTE ESTERASE UR QL STRIP.AUTO: NEGATIVE
NITRITE UR QL STRIP: NEGATIVE
PH UR STRIP.AUTO: 5.5 [PH] (ref 5–8)
PROT UR QL STRIP: NEGATIVE
SP GR UR STRIP: 1.01 (ref 1–1.03)
UROBILINOGEN UR QL STRIP: NORMAL

## 2025-04-21 PROCEDURE — 81003 URINALYSIS AUTO W/O SCOPE: CPT | Performed by: EMERGENCY MEDICINE

## 2025-04-21 PROCEDURE — 99283 EMERGENCY DEPT VISIT LOW MDM: CPT

## 2025-04-21 PROCEDURE — 73560 X-RAY EXAM OF KNEE 1 OR 2: CPT

## 2025-04-21 RX ORDER — LIDOCAINE 4 G/G
1 PATCH TOPICAL
Status: DISCONTINUED | OUTPATIENT
Start: 2025-04-21 | End: 2025-04-21 | Stop reason: HOSPADM

## 2025-04-21 RX ADMIN — LIDOCAINE 1 PATCH: 4 PATCH TOPICAL at 11:38

## 2025-04-21 NOTE — DISCHARGE INSTRUCTIONS
Wear ace wrap for compression and support.    Eat small, frequent meals and drink plenty of fluids.     Take all of your regular medicines as previously prescribed.    Monitor for any signs of recurrent symptoms or worsening and see your primary doctor to discuss your ER visit while returning to the ER if any concerns as we discussed.

## 2025-04-21 NOTE — ED PROVIDER NOTES
EMERGENCY DEPARTMENT ENCOUNTER    Room Number:  15/15  PCP: Shania Matthews MD  Independent Historians: Patient, Family, and EMS    HPI:  Chief Complaint: had concerns including Knee Pain (L knee pain after fall yesterday).      A complete HPI/ROS/PMH/PSH/SH/FH are unobtainable due to: Poor historian    Chronic or social conditions impacting patient care (Social Determinants of Health): None      Context: Sue Simmons is a 79 y.o. female with a medical history of CKD, hypothyroidism, dementia, hypertension who presents to the ED c/o acute fall at her long-term care facility.  Patient fell down onto both of her knees yesterday.  Patient with some mobility issues at baseline but usually able to move around with a walker per EMS.  Patient with left knee pain this morning and did not want to get out of bed at the facility due to pain.  Patient sent in for further evaluation of her knee pain.  Patient denies any other injuries.  Patient did not strike her head or lose consciousness.        Review of prior external notes (non-ED) -and- Review of prior external test results outside of this encounter: Patient evaluated for a fall in June 2024.  At that time she had imaging of her right hip significant for degenerative joint disease but no fracture.    Prescription drug monitoring program review:     N/A    PAST MEDICAL HISTORY  Active Ambulatory Problems     Diagnosis Date Noted    Acquired hypothyroidism 02/15/2022    Right hip pain 02/15/2022    Stage 3 chronic kidney disease 02/15/2022    History of breast cancer 02/15/2022    Anemia 02/22/2023    Arthritis 02/22/2023    Back pain with radiation 01/24/2013    Breast lump 02/22/2023    Cancer 02/22/2023    Cancer of right breast 02/22/2023    Family history of coronary artery disease 02/22/2023    Family history of lung cancer 02/22/2023    Family history of thyroid disease 02/22/2023    Foot pain, right 02/22/2023    Hematuria, microscopic 02/22/2023    Hemorrhoids  02/22/2023    IC (interstitial cystitis) 02/22/2023    Ingrown toenail 02/22/2023    Mood disorder 02/22/2023    Rectal bleeding 02/22/2023    Varicose veins of lower extremities with inflammation 05/07/2014    Venous insufficiency (chronic) (peripheral) 05/07/2014    Vitamin D deficiency 02/22/2023    Trochanteric bursitis of right hip 06/27/2023    Osteoarthritis of both hips 06/27/2023    Right knee pain 06/27/2023    Primary osteoarthritis of right knee 06/27/2023    Acute renal failure, unspecified acute renal failure type 06/28/2023    Acute kidney injury 08/20/2023    Benign hypertension with chronic kidney disease 08/20/2023    Gastroesophageal reflux disease 08/20/2023    Other retention of urine 08/20/2023     Resolved Ambulatory Problems     Diagnosis Date Noted    No Resolved Ambulatory Problems     Past Medical History:   Diagnosis Date    Difficulty walking     Fracture, clavicle 1956    GERD (gastroesophageal reflux disease)     Hip arthrosis 2022    Hypertension 03/2023    Hypothyroidism 01/24/2013    Ingrowing toenail     Low back pain 06/2023    Microscopic hematuria     Plantar fasciitis     PONV (postoperative nausea and vomiting)     Renal insufficiency 06/2023    Urinary tract infection 07/2023         PAST SURGICAL HISTORY  Past Surgical History:   Procedure Laterality Date    APPENDECTOMY      BREAST BIOPSY      right side    BREAST LUMPECTOMY Bilateral     BREAST SURGERY      COLONOSCOPY      CYST REMOVAL  1957    removed form coccyx    CYSTOSCOPY      EYE SURGERY      lasix    HARDWARE REMOVAL      PORTACATH PLACEMENT      TOENAIL EXCISION      TONSILLECTOMY      TUBAL ABDOMINAL LIGATION  1978         FAMILY HISTORY  Family History   Problem Relation Age of Onset    Lung cancer Mother     Brain cancer Mother     Diabetes Mother         due to Graves Disease    Throat cancer Mother     Arthritis Mother     Cancer Mother         Lung Cancer    Thyroid disease Mother         Graves Disease     Hypertension Mother     Heart disease Father     Breast cancer Father         40s    Arthritis Father     Cancer Father         Breast Cancer    Rheumatologic disease Father         arthris in hands    Other Other         heart surgery in     Alzheimer's disease Other     Graves' disease Other     Diabetes Maternal Aunt         unspecified type    Diabetes Maternal Uncle         unspecified type    Kidney nephrosis Maternal Uncle          SOCIAL HISTORY  Social History     Socioeconomic History    Marital status:    Tobacco Use    Smoking status: Former     Current packs/day: 0.00     Types: Cigarettes     Start date: 1960     Quit date: 2022     Years since quittin.8     Passive exposure: Past    Smokeless tobacco: Never    Tobacco comments:     quit at age 59, second hand smoke exposure- never   Vaping Use    Vaping status: Never Used   Substance and Sexual Activity    Alcohol use: Not Currently     Comment: light    Drug use: Never    Sexual activity: Not Currently         ALLERGIES  Sulfa antibiotics        REVIEW OF SYSTEMS  Review of Systems  Included in HPI  All systems reviewed and negative except for those discussed in HPI.      PHYSICAL EXAM    I have reviewed the triage vital signs and nursing notes.    ED Triage Vitals   Temp Heart Rate Resp BP SpO2   25 0946 25 0944 25 0944 25 0944 25 0944   97.2 °F (36.2 °C) 67 18 135/82 99 %      Temp src Heart Rate Source Patient Position BP Location FiO2 (%)   25 0946 25 0944 -- -- --   Oral Monitor          Physical Exam  GENERAL: Pleasant and cooperative female of advanced age, alert, no acute distress  SKIN: Warm, dry  HENT: Normocephalic, atraumatic  EYES: no scleral icterus  RESPIRATORY: Relaxed breathing  ABDOMEN: soft, nontender, nondistended  MUSCULOSKELETAL: no deformity, tenderness to palpation over anterior left knee with significant pain with any attempts at range of motion in bed, no pain  at left hip with range of motion, 2+ DP and PT pulses left lower extremity  NEURO: alert, moves all extremities, follows commands                                                                   LAB RESULTS  Recent Results (from the past 24 hours)   Urinalysis With Microscopic If Indicated (No Culture) - Urine, Clean Catch    Collection Time: 04/21/25 11:38 AM    Specimen: Urine, Clean Catch   Result Value Ref Range    Color, UA Yellow Yellow, Straw    Appearance, UA Clear Clear    pH, UA 5.5 5.0 - 8.0    Specific Gravity, UA 1.012 1.005 - 1.030    Glucose, UA Negative Negative    Ketones, UA Negative Negative    Bilirubin, UA Negative Negative    Blood, UA Negative Negative    Protein, UA Negative Negative    Leuk Esterase, UA Negative Negative    Nitrite, UA Negative Negative    Urobilinogen, UA 0.2 E.U./dL 0.2 - 1.0 E.U./dL         RADIOLOGY  XR Knee 1 or 2 View Left  Result Date: 4/21/2025  XR KNEE 1 OR 2 VW LEFT-  INDICATIONS: Trauma  TECHNIQUE: 2 VIEWS OF THE LEFT KNEE  COMPARISON: None available  FINDINGS:  Minimal to mild knee effusion is apparent. Minimal degenerative spurring. No acute fracture, erosion, or dislocation is identified. Mild medial tibiofemoral joint space narrowing is present. Follow-up/further evaluation can be obtained as indications persist.       As described.    This report was finalized on 4/21/2025 10:45 AM by Dr. Gurwinder Benson M.D on Workstation: [id number]          MEDICATIONS GIVEN IN ER  Medications   Lidocaine 4 % 1 patch (1 patch Transdermal Medication Applied 4/21/25 1012)         ORDERS PLACED DURING THIS VISIT:  Orders Placed This Encounter   Procedures    XR Knee 1 or 2 View Left    Urinalysis With Microscopic If Indicated (No Culture) - Urine, Clean Catch    Ace wrap to left knee  Misc Nursing Order (Specify)    Ambulate patient in ace wrap left knee  Misc Nursing Order (Specify)         OUTPATIENT MEDICATION MANAGEMENT:  Current Facility-Administered Medications Ordered  in Epic   Medication Dose Route Frequency Provider Last Rate Last Admin    Lidocaine 4 % 1 patch  1 patch Transdermal Q24H Shobha Muro MD   1 patch at 04/21/25 1138     Current Outpatient Medications Ordered in Epic   Medication Sig Dispense Refill    acetaminophen (TYLENOL) 325 MG tablet Take 2 tablets by mouth Every 6 (Six) Hours As Needed for Mild Pain.      ammonium lactate (LAC-HYDRIN) 12 % lotion APPLY TOPICALLY TO THE APPROPRIATE AREA AS DIRECTED TWICE DAILY 225 g 11    famotidine (PEPCID) 40 MG tablet TAKE 1 TABLET BY MOUTH DAILY 90 tablet 1    furosemide (LASIX) 40 MG tablet TAKE 1 TABLET BY MOUTH TWICE DAILY AS NEEDED FOR SWELLING 180 tablet 1    levothyroxine (SYNTHROID, LEVOTHROID) 100 MCG tablet TAKE 1 TABLET(100 MCG) BY MOUTH EVERY DAY 90 tablet 1    lisinopril (PRINIVIL,ZESTRIL) 10 MG tablet TAKE 1 TABLET BY MOUTH DAILY 30 tablet 3    magnesium hydroxide (MILK OF MAGNESIA) 400 MG/5ML suspension Take  by mouth Daily As Needed for Constipation.      omeprazole (priLOSEC) 40 MG capsule       QUEtiapine (SEROquel) 25 MG tablet TAKE 1 TABLET BY MOUTH EVERY NIGHT 90 tablet 1    sodium bicarbonate 325 MG tablet TAKE 1 TABLET BY MOUTH TWICE DAILY 60 tablet 0    traMADol (ULTRAM) 50 MG tablet Take 1 tablet by mouth Every 8 (Eight) Hours As Needed for Moderate Pain. (Patient not taking: Reported on 3/22/2024)           PROCEDURES  Procedures            PROGRESS, DATA ANALYSIS, CONSULTS, AND MEDICAL DECISION MAKING  All labs have been independently interpreted by me.  All radiology studies have been reviewed by me. All EKG's have been independently viewed and interpreted by me.  Discussion below represents my analysis of pertinent findings related to patient's condition, differential diagnosis, treatment plan and final disposition.    This patient presents with knee pain that is most likely due to knee sprain or osteoarthritis. The differential diagnosis list includes but is not limited to:  fracture,  dislocation, significant ligamentous injury, knee effusion, bursitis, anterior knee pain syndrome, malignancy, synovial plica syndrome, patello femoral syndrome, septic arthritis, gout flare, new autoimmune arthropathy, or gonococcal arthropathy.  Plan x-ray, pain control, and trial of ambulation if imaging reassuring.    ED Course as of 04/21/25 1711   Mon Apr 21, 2025   1126 Daughter here and concerned about UTI. Pt is on chronic lasix and appropriately diuresing.  No fever, no malodorous urine, no n/v.  Will check UA [AR]   1329 Pt up to bedside potty with no difficulty [AR]   1343 I discussed all results with patient and answered all questions to the best of my ability. The patient was encouraged to follow up appropriately.      Routine discharge counseling was given, and the patient was instructed to promptly call or followup with their primary physician or even return to the ER if any worsening, changing or persistent symptoms.         [AR]      ED Course User Index  [AR] Shobha Muro MD             AS OF 17:11 EDT VITALS:    BP - 131/62  HR - 60  TEMP - 97.2 °F (36.2 °C) (Oral)  O2 SATS - 96%      COMPLEXITY OF CARE  Admission was considered but after careful review of the patient's presentation, physical examination, diagnostic results, and response to treatment the patient may be safely discharged with outpatient follow-up.    DIAGNOSIS  Final diagnoses:   Contusion of left knee, initial encounter         DISPOSITION  ED Disposition       ED Disposition   Discharge    Condition   Stable    Comment   --                Please note that portions of this document were completed with a voice recognition program.    Note Disclaimer: At [facility], we believe that sharing information builds trust and better relationships. You are receiving this note because you recently visited [facility]. It is possible you will see health information before a provider has talked with you about it. This kind of  information can be easy to misunderstand. To help you fully understand what it means for your health, we urge you to discuss this note with your provider.         Shobha Muro MD  04/22/25 2244

## 2025-04-21 NOTE — ED NOTES
Pt to ED from [facility]. Pt fell yesterday landing on both knees, prior to fall pt expressed swelling bilaterally in legs. Pt now complains of L knee pain.     Pt has a hx of dementia.